# Patient Record
Sex: MALE | Race: WHITE | NOT HISPANIC OR LATINO | Employment: OTHER | URBAN - METROPOLITAN AREA
[De-identification: names, ages, dates, MRNs, and addresses within clinical notes are randomized per-mention and may not be internally consistent; named-entity substitution may affect disease eponyms.]

---

## 2017-07-21 ENCOUNTER — ALLSCRIPTS OFFICE VISIT (OUTPATIENT)
Dept: OTHER | Facility: OTHER | Age: 72
End: 2017-07-21

## 2017-07-21 LAB
BILIRUB UR QL STRIP: NEGATIVE
CLARITY UR: NORMAL
COLOR UR: YELLOW
GLUCOSE (HISTORICAL): NEGATIVE
HGB UR QL STRIP.AUTO: NORMAL
KETONES UR STRIP-MCNC: NEGATIVE MG/DL
LEUKOCYTE ESTERASE UR QL STRIP: NEGATIVE
NITRITE UR QL STRIP: NEGATIVE
PH UR STRIP.AUTO: 7 [PH]
PROT UR STRIP-MCNC: NEGATIVE MG/DL
SP GR UR STRIP.AUTO: 1.01
UROBILINOGEN UR QL STRIP.AUTO: 0.2

## 2017-07-31 DIAGNOSIS — K51.90 ULCERATIVE COLITIS WITHOUT COMPLICATIONS (HCC): ICD-10-CM

## 2017-07-31 DIAGNOSIS — N39.0 URINARY TRACT INFECTION: ICD-10-CM

## 2017-08-03 ENCOUNTER — ANESTHESIA EVENT (OUTPATIENT)
Dept: PERIOP | Facility: HOSPITAL | Age: 72
End: 2017-08-03
Payer: COMMERCIAL

## 2017-08-03 RX ORDER — SODIUM CHLORIDE 9 MG/ML
125 INJECTION, SOLUTION INTRAVENOUS CONTINUOUS
Status: CANCELLED | OUTPATIENT
Start: 2017-08-10

## 2017-08-04 ENCOUNTER — APPOINTMENT (OUTPATIENT)
Dept: PREADMISSION TESTING | Facility: HOSPITAL | Age: 72
End: 2017-08-04
Payer: COMMERCIAL

## 2017-08-04 ENCOUNTER — APPOINTMENT (OUTPATIENT)
Dept: LAB | Facility: HOSPITAL | Age: 72
End: 2017-08-04
Attending: UROLOGY
Payer: COMMERCIAL

## 2017-08-04 ENCOUNTER — TRANSCRIBE ORDERS (OUTPATIENT)
Dept: ADMINISTRATIVE | Facility: HOSPITAL | Age: 72
End: 2017-08-04

## 2017-08-04 ENCOUNTER — HOSPITAL ENCOUNTER (OUTPATIENT)
Dept: NON INVASIVE DIAGNOSTICS | Facility: HOSPITAL | Age: 72
Discharge: HOME | End: 2017-08-04
Attending: UROLOGY
Payer: COMMERCIAL

## 2017-08-04 VITALS
HEART RATE: 61 BPM | TEMPERATURE: 97.4 F | HEIGHT: 72 IN | DIASTOLIC BLOOD PRESSURE: 78 MMHG | SYSTOLIC BLOOD PRESSURE: 128 MMHG | WEIGHT: 179 LBS | BODY MASS INDEX: 24.24 KG/M2 | RESPIRATION RATE: 16 BRPM

## 2017-08-04 DIAGNOSIS — Z01.818 PREOP EXAMINATION: ICD-10-CM

## 2017-08-04 DIAGNOSIS — N13.8 ENLARGED PROSTATE WITH URINARY OBSTRUCTION: ICD-10-CM

## 2017-08-04 DIAGNOSIS — Z01.818 PREOP EXAMINATION: Primary | ICD-10-CM

## 2017-08-04 DIAGNOSIS — N40.1 ENLARGED PROSTATE WITH URINARY OBSTRUCTION: ICD-10-CM

## 2017-08-04 LAB
ALBUMIN SERPL BCP-MCNC: 3.9 G/DL (ref 3.5–5)
ALP SERPL-CCNC: 85 U/L (ref 46–116)
ALT SERPL W P-5'-P-CCNC: 39 U/L (ref 12–78)
ANION GAP SERPL CALCULATED.3IONS-SCNC: 5 MMOL/L (ref 4–13)
APTT PPP: 26 SECONDS (ref 23–35)
AST SERPL W P-5'-P-CCNC: 23 U/L (ref 5–45)
BASOPHILS # BLD AUTO: 0.04 THOUSANDS/ΜL (ref 0–0.1)
BASOPHILS NFR BLD AUTO: 1 % (ref 0–1)
BILIRUB SERPL-MCNC: 0.54 MG/DL (ref 0.2–1)
BUN SERPL-MCNC: 13 MG/DL (ref 5–25)
CALCIUM SERPL-MCNC: 9.6 MG/DL (ref 8.3–10.1)
CHLORIDE SERPL-SCNC: 102 MMOL/L (ref 100–108)
CO2 SERPL-SCNC: 31 MMOL/L (ref 21–32)
CREAT SERPL-MCNC: 0.87 MG/DL (ref 0.6–1.3)
EOSINOPHIL # BLD AUTO: 0.11 THOUSAND/ΜL (ref 0–0.61)
EOSINOPHIL NFR BLD AUTO: 2 % (ref 0–6)
ERYTHROCYTE [DISTWIDTH] IN BLOOD BY AUTOMATED COUNT: 14.7 % (ref 11.6–15.1)
GFR SERPL CREATININE-BSD FRML MDRD: 85 ML/MIN/1.73SQ M
GLUCOSE SERPL-MCNC: 102 MG/DL (ref 65–140)
HCT VFR BLD AUTO: 48.2 % (ref 36.5–49.3)
HGB BLD-MCNC: 16.4 G/DL (ref 12–17)
INR PPP: 1.03 (ref 0.86–1.16)
LYMPHOCYTES # BLD AUTO: 1.55 THOUSANDS/ΜL (ref 0.6–4.47)
LYMPHOCYTES NFR BLD AUTO: 23 % (ref 14–44)
MCH RBC QN AUTO: 30.8 PG (ref 26.8–34.3)
MCHC RBC AUTO-ENTMCNC: 34 G/DL (ref 31.4–37.4)
MCV RBC AUTO: 91 FL (ref 82–98)
MONOCYTES # BLD AUTO: 0.71 THOUSAND/ΜL (ref 0.17–1.22)
MONOCYTES NFR BLD AUTO: 10 % (ref 4–12)
NEUTROPHILS # BLD AUTO: 4.41 THOUSANDS/ΜL (ref 1.85–7.62)
NEUTS SEG NFR BLD AUTO: 64 % (ref 43–75)
NRBC BLD AUTO-RTO: 0 /100 WBCS
PLATELET # BLD AUTO: 260 THOUSANDS/UL (ref 149–390)
PMV BLD AUTO: 10.8 FL (ref 8.9–12.7)
POTASSIUM SERPL-SCNC: 4 MMOL/L (ref 3.5–5.3)
PROT SERPL-MCNC: 7.5 G/DL (ref 6.4–8.2)
PROTHROMBIN TIME: 13.5 SECONDS (ref 12.1–14.4)
RBC # BLD AUTO: 5.32 MILLION/UL (ref 3.88–5.62)
SODIUM SERPL-SCNC: 138 MMOL/L (ref 136–145)
WBC # BLD AUTO: 6.82 THOUSAND/UL (ref 4.31–10.16)

## 2017-08-04 PROCEDURE — 85025 COMPLETE CBC W/AUTO DIFF WBC: CPT

## 2017-08-04 PROCEDURE — 93005 ELECTROCARDIOGRAM TRACING: CPT

## 2017-08-04 PROCEDURE — 80053 COMPREHEN METABOLIC PANEL: CPT

## 2017-08-04 PROCEDURE — 36415 COLL VENOUS BLD VENIPUNCTURE: CPT

## 2017-08-04 PROCEDURE — 85730 THROMBOPLASTIN TIME PARTIAL: CPT

## 2017-08-04 PROCEDURE — 87086 URINE CULTURE/COLONY COUNT: CPT

## 2017-08-04 PROCEDURE — 85610 PROTHROMBIN TIME: CPT

## 2017-08-04 RX ORDER — FLUTICASONE PROPIONATE 50 MCG
1 SPRAY, SUSPENSION (ML) NASAL EVERY EVENING
COMMUNITY

## 2017-08-04 RX ORDER — TAMSULOSIN HYDROCHLORIDE 0.4 MG/1
0.4 CAPSULE ORAL
COMMUNITY
End: 2018-10-29

## 2017-08-04 RX ORDER — FINASTERIDE 5 MG/1
5 TABLET, FILM COATED ORAL
COMMUNITY
End: 2018-10-29

## 2017-08-04 RX ORDER — MONTELUKAST SODIUM 10 MG/1
10 TABLET ORAL
COMMUNITY

## 2017-08-04 RX ORDER — ROSUVASTATIN CALCIUM 10 MG/1
10 TABLET, COATED ORAL
COMMUNITY

## 2017-08-04 RX ORDER — ACETAMINOPHEN 500 MG
500 TABLET ORAL EVERY 6 HOURS PRN
COMMUNITY

## 2017-08-04 RX ORDER — ALBUTEROL SULFATE 90 UG/1
2 AEROSOL, METERED RESPIRATORY (INHALATION) AS NEEDED
COMMUNITY

## 2017-08-04 RX ORDER — VALSARTAN 80 MG/1
80 TABLET ORAL DAILY
COMMUNITY
End: 2019-01-17 | Stop reason: ALTCHOICE

## 2017-08-05 LAB
ATRIAL RATE: 61 BPM
BACTERIA UR CULT: NORMAL
P AXIS: 60 DEGREES
PR INTERVAL: 162 MS
QRS AXIS: 70 DEGREES
QRSD INTERVAL: 110 MS
QT INTERVAL: 392 MS
QTC INTERVAL: 394 MS
T WAVE AXIS: 62 DEGREES
VENTRICULAR RATE: 61 BPM

## 2017-08-10 ENCOUNTER — GENERIC CONVERSION - ENCOUNTER (OUTPATIENT)
Dept: UROLOGY | Facility: MEDICAL CENTER | Age: 72
End: 2017-08-10

## 2017-08-10 ENCOUNTER — ANESTHESIA (OUTPATIENT)
Dept: PERIOP | Facility: HOSPITAL | Age: 72
End: 2017-08-10
Payer: COMMERCIAL

## 2017-08-10 ENCOUNTER — HOSPITAL ENCOUNTER (OUTPATIENT)
Facility: HOSPITAL | Age: 72
Setting detail: OUTPATIENT SURGERY
Discharge: HOME | End: 2017-08-11
Attending: UROLOGY | Admitting: UROLOGY
Payer: COMMERCIAL

## 2017-08-10 DIAGNOSIS — N40.1 ENLARGED PROSTATE WITH LOWER URINARY TRACT SYMPTOMS (LUTS): ICD-10-CM

## 2017-08-10 LAB
ANION GAP SERPL CALCULATED.3IONS-SCNC: 5 MMOL/L (ref 4–13)
BUN SERPL-MCNC: 8 MG/DL (ref 5–25)
CALCIUM SERPL-MCNC: 8.1 MG/DL (ref 8.3–10.1)
CHLORIDE SERPL-SCNC: 109 MMOL/L (ref 100–108)
CO2 SERPL-SCNC: 27 MMOL/L (ref 21–32)
CREAT SERPL-MCNC: 0.78 MG/DL (ref 0.6–1.3)
ERYTHROCYTE [DISTWIDTH] IN BLOOD BY AUTOMATED COUNT: 14.6 % (ref 11.6–15.1)
GFR SERPL CREATININE-BSD FRML MDRD: 89 ML/MIN/1.73SQ M
GLUCOSE P FAST SERPL-MCNC: 122 MG/DL (ref 65–99)
GLUCOSE SERPL-MCNC: 122 MG/DL (ref 65–140)
HCT VFR BLD AUTO: 40.6 % (ref 36.5–49.3)
HGB BLD-MCNC: 13.7 G/DL (ref 12–17)
MCH RBC QN AUTO: 31 PG (ref 26.8–34.3)
MCHC RBC AUTO-ENTMCNC: 33.7 G/DL (ref 31.4–37.4)
MCV RBC AUTO: 92 FL (ref 82–98)
PLATELET # BLD AUTO: 200 THOUSANDS/UL (ref 149–390)
PMV BLD AUTO: 10.3 FL (ref 8.9–12.7)
POTASSIUM SERPL-SCNC: 4.1 MMOL/L (ref 3.5–5.3)
RBC # BLD AUTO: 4.42 MILLION/UL (ref 3.88–5.62)
SODIUM SERPL-SCNC: 141 MMOL/L (ref 136–145)
WBC # BLD AUTO: 9.25 THOUSAND/UL (ref 4.31–10.16)

## 2017-08-10 PROCEDURE — 88305 TISSUE EXAM BY PATHOLOGIST: CPT | Performed by: UROLOGY

## 2017-08-10 PROCEDURE — 80048 BASIC METABOLIC PNL TOTAL CA: CPT | Performed by: UROLOGY

## 2017-08-10 PROCEDURE — 85027 COMPLETE CBC AUTOMATED: CPT | Performed by: UROLOGY

## 2017-08-10 RX ORDER — ACETAMINOPHEN 325 MG/1
500 TABLET ORAL EVERY 6 HOURS PRN
Status: DISCONTINUED | OUTPATIENT
Start: 2017-08-10 | End: 2017-08-10 | Stop reason: SDUPTHER

## 2017-08-10 RX ORDER — MONTELUKAST SODIUM 10 MG/1
10 TABLET ORAL
Status: DISCONTINUED | OUTPATIENT
Start: 2017-08-10 | End: 2017-08-11 | Stop reason: HOSPADM

## 2017-08-10 RX ORDER — MIDAZOLAM HYDROCHLORIDE 1 MG/ML
INJECTION INTRAMUSCULAR; INTRAVENOUS AS NEEDED
Status: DISCONTINUED | OUTPATIENT
Start: 2017-08-10 | End: 2017-08-10 | Stop reason: SURG

## 2017-08-10 RX ORDER — SODIUM CHLORIDE 9 MG/ML
50 INJECTION, SOLUTION INTRAVENOUS CONTINUOUS
Status: DISCONTINUED | OUTPATIENT
Start: 2017-08-10 | End: 2017-08-11 | Stop reason: HOSPADM

## 2017-08-10 RX ORDER — ONDANSETRON 2 MG/ML
4 INJECTION INTRAMUSCULAR; INTRAVENOUS ONCE AS NEEDED
Status: DISCONTINUED | OUTPATIENT
Start: 2017-08-10 | End: 2017-08-10 | Stop reason: HOSPADM

## 2017-08-10 RX ORDER — ALBUTEROL SULFATE 90 UG/1
2 AEROSOL, METERED RESPIRATORY (INHALATION) EVERY 6 HOURS PRN
Status: DISCONTINUED | OUTPATIENT
Start: 2017-08-10 | End: 2017-08-11 | Stop reason: HOSPADM

## 2017-08-10 RX ORDER — FINASTERIDE 5 MG/1
5 TABLET, FILM COATED ORAL
Status: DISCONTINUED | OUTPATIENT
Start: 2017-08-10 | End: 2017-08-11 | Stop reason: HOSPADM

## 2017-08-10 RX ORDER — PRAVASTATIN SODIUM 40 MG
40 TABLET ORAL
Status: DISCONTINUED | OUTPATIENT
Start: 2017-08-10 | End: 2017-08-11 | Stop reason: HOSPADM

## 2017-08-10 RX ORDER — PROPOFOL 10 MG/ML
INJECTION, EMULSION INTRAVENOUS AS NEEDED
Status: DISCONTINUED | OUTPATIENT
Start: 2017-08-10 | End: 2017-08-10 | Stop reason: SURG

## 2017-08-10 RX ORDER — DOCUSATE SODIUM 100 MG/1
100 CAPSULE, LIQUID FILLED ORAL 2 TIMES DAILY
Status: DISCONTINUED | OUTPATIENT
Start: 2017-08-10 | End: 2017-08-11 | Stop reason: HOSPADM

## 2017-08-10 RX ORDER — MAGNESIUM HYDROXIDE 1200 MG/15ML
LIQUID ORAL AS NEEDED
Status: DISCONTINUED | OUTPATIENT
Start: 2017-08-10 | End: 2017-08-10 | Stop reason: HOSPADM

## 2017-08-10 RX ORDER — FLUTICASONE PROPIONATE 50 MCG
1 SPRAY, SUSPENSION (ML) NASAL EVERY EVENING
Status: DISCONTINUED | OUTPATIENT
Start: 2017-08-10 | End: 2017-08-11 | Stop reason: HOSPADM

## 2017-08-10 RX ORDER — SORBITOL 30 G/1000ML
IRRIGANT IRRIGATION AS NEEDED
Status: DISCONTINUED | OUTPATIENT
Start: 2017-08-10 | End: 2017-08-10 | Stop reason: HOSPADM

## 2017-08-10 RX ORDER — FENTANYL CITRATE/PF 50 MCG/ML
25 SYRINGE (ML) INJECTION
Status: DISCONTINUED | OUTPATIENT
Start: 2017-08-10 | End: 2017-08-10 | Stop reason: HOSPADM

## 2017-08-10 RX ORDER — MORPHINE SULFATE 2 MG/ML
2 INJECTION, SOLUTION INTRAMUSCULAR; INTRAVENOUS EVERY 4 HOURS PRN
Status: DISCONTINUED | OUTPATIENT
Start: 2017-08-10 | End: 2017-08-11 | Stop reason: HOSPADM

## 2017-08-10 RX ORDER — SODIUM CHLORIDE 9 MG/ML
INJECTION, SOLUTION INTRAVENOUS AS NEEDED
Status: DISCONTINUED | OUTPATIENT
Start: 2017-08-10 | End: 2017-08-10 | Stop reason: HOSPADM

## 2017-08-10 RX ORDER — ACETAMINOPHEN 325 MG/1
650 TABLET ORAL EVERY 4 HOURS PRN
Status: DISCONTINUED | OUTPATIENT
Start: 2017-08-10 | End: 2017-08-11 | Stop reason: HOSPADM

## 2017-08-10 RX ORDER — CEFAZOLIN SODIUM 1 G/3ML
INJECTION, POWDER, FOR SOLUTION INTRAMUSCULAR; INTRAVENOUS AS NEEDED
Status: DISCONTINUED | OUTPATIENT
Start: 2017-08-10 | End: 2017-08-10 | Stop reason: SURG

## 2017-08-10 RX ORDER — TAMSULOSIN HYDROCHLORIDE 0.4 MG/1
0.4 CAPSULE ORAL
Status: DISCONTINUED | OUTPATIENT
Start: 2017-08-10 | End: 2017-08-11 | Stop reason: HOSPADM

## 2017-08-10 RX ORDER — SODIUM CHLORIDE 9 MG/ML
125 INJECTION, SOLUTION INTRAVENOUS CONTINUOUS
Status: DISCONTINUED | OUTPATIENT
Start: 2017-08-10 | End: 2017-08-10

## 2017-08-10 RX ORDER — VALSARTAN 80 MG/1
80 TABLET ORAL DAILY
Status: DISCONTINUED | OUTPATIENT
Start: 2017-08-11 | End: 2017-08-11 | Stop reason: HOSPADM

## 2017-08-10 RX ORDER — FENTANYL CITRATE 50 UG/ML
INJECTION, SOLUTION INTRAMUSCULAR; INTRAVENOUS AS NEEDED
Status: DISCONTINUED | OUTPATIENT
Start: 2017-08-10 | End: 2017-08-10 | Stop reason: SURG

## 2017-08-10 RX ADMIN — SODIUM CHLORIDE 125 ML/HR: 0.9 INJECTION, SOLUTION INTRAVENOUS at 09:06

## 2017-08-10 RX ADMIN — TAMSULOSIN HYDROCHLORIDE 0.4 MG: 0.4 CAPSULE ORAL at 18:56

## 2017-08-10 RX ADMIN — ACETAMINOPHEN 650 MG: 325 TABLET, FILM COATED ORAL at 22:08

## 2017-08-10 RX ADMIN — SODIUM CHLORIDE: 0.9 INJECTION, SOLUTION INTRAVENOUS at 14:20

## 2017-08-10 RX ADMIN — FINASTERIDE 5 MG: 5 TABLET, FILM COATED ORAL at 21:35

## 2017-08-10 RX ADMIN — PROPOFOL 200 MG: 10 INJECTION, EMULSION INTRAVENOUS at 13:57

## 2017-08-10 RX ADMIN — SODIUM CHLORIDE 50 ML/HR: 0.9 INJECTION, SOLUTION INTRAVENOUS at 18:59

## 2017-08-10 RX ADMIN — FLUTICASONE PROPIONATE 1 SPRAY: 50 SPRAY, METERED NASAL at 22:59

## 2017-08-10 RX ADMIN — DEXAMETHASONE SODIUM PHOSPHATE 4 MG: 10 INJECTION INTRAMUSCULAR; INTRAVENOUS at 14:24

## 2017-08-10 RX ADMIN — DOCUSATE SODIUM 100 MG: 100 CAPSULE, LIQUID FILLED ORAL at 18:56

## 2017-08-10 RX ADMIN — FENTANYL CITRATE 50 MCG: 50 INJECTION, SOLUTION INTRAMUSCULAR; INTRAVENOUS at 14:12

## 2017-08-10 RX ADMIN — FLUTICASONE PROPIONATE AND SALMETEROL 1 PUFF: 50; 100 POWDER RESPIRATORY (INHALATION) at 22:57

## 2017-08-10 RX ADMIN — LIDOCAINE HYDROCHLORIDE 40 MG: 20 INJECTION, SOLUTION INTRAVENOUS at 13:57

## 2017-08-10 RX ADMIN — SODIUM CHLORIDE: 0.9 INJECTION, SOLUTION INTRAVENOUS at 16:04

## 2017-08-10 RX ADMIN — MONTELUKAST SODIUM 10 MG: 10 TABLET, COATED ORAL at 21:35

## 2017-08-10 RX ADMIN — SODIUM CHLORIDE: 0.9 INJECTION, SOLUTION INTRAVENOUS at 15:41

## 2017-08-10 RX ADMIN — MIDAZOLAM HYDROCHLORIDE 2 MG: 1 INJECTION, SOLUTION INTRAMUSCULAR; INTRAVENOUS at 13:53

## 2017-08-10 RX ADMIN — CEFAZOLIN 2000 MG: 1 INJECTION, POWDER, FOR SOLUTION INTRAVENOUS at 13:53

## 2017-08-11 ENCOUNTER — GENERIC CONVERSION - ENCOUNTER (OUTPATIENT)
Dept: OTHER | Facility: OTHER | Age: 72
End: 2017-08-11

## 2017-08-11 VITALS
BODY MASS INDEX: 24.24 KG/M2 | TEMPERATURE: 96.7 F | HEIGHT: 72 IN | OXYGEN SATURATION: 92 % | RESPIRATION RATE: 18 BRPM | SYSTOLIC BLOOD PRESSURE: 121 MMHG | WEIGHT: 179 LBS | DIASTOLIC BLOOD PRESSURE: 61 MMHG | HEART RATE: 70 BPM

## 2017-08-11 LAB
ANION GAP SERPL CALCULATED.3IONS-SCNC: 6 MMOL/L (ref 4–13)
BASOPHILS # BLD AUTO: 0.01 THOUSANDS/ΜL (ref 0–0.1)
BASOPHILS NFR BLD AUTO: 0 % (ref 0–1)
BUN SERPL-MCNC: 8 MG/DL (ref 5–25)
CALCIUM SERPL-MCNC: 8.1 MG/DL (ref 8.3–10.1)
CHLORIDE SERPL-SCNC: 109 MMOL/L (ref 100–108)
CO2 SERPL-SCNC: 25 MMOL/L (ref 21–32)
CREAT SERPL-MCNC: 0.77 MG/DL (ref 0.6–1.3)
EOSINOPHIL # BLD AUTO: 0 THOUSAND/ΜL (ref 0–0.61)
EOSINOPHIL NFR BLD AUTO: 0 % (ref 0–6)
ERYTHROCYTE [DISTWIDTH] IN BLOOD BY AUTOMATED COUNT: 14.7 % (ref 11.6–15.1)
ERYTHROCYTE [DISTWIDTH] IN BLOOD BY AUTOMATED COUNT: 14.7 % (ref 11.6–15.1)
GFR SERPL CREATININE-BSD FRML MDRD: 89 ML/MIN/1.73SQ M
GLUCOSE SERPL-MCNC: 122 MG/DL (ref 65–140)
HCT VFR BLD AUTO: 36.2 % (ref 36.5–49.3)
HCT VFR BLD AUTO: 37.9 % (ref 36.5–49.3)
HGB BLD-MCNC: 12.3 G/DL (ref 12–17)
HGB BLD-MCNC: 13 G/DL (ref 12–17)
LYMPHOCYTES # BLD AUTO: 0.88 THOUSANDS/ΜL (ref 0.6–4.47)
LYMPHOCYTES NFR BLD AUTO: 9 % (ref 14–44)
MCH RBC QN AUTO: 31 PG (ref 26.8–34.3)
MCH RBC QN AUTO: 31.2 PG (ref 26.8–34.3)
MCHC RBC AUTO-ENTMCNC: 34 G/DL (ref 31.4–37.4)
MCHC RBC AUTO-ENTMCNC: 34.3 G/DL (ref 31.4–37.4)
MCV RBC AUTO: 91 FL (ref 82–98)
MCV RBC AUTO: 91 FL (ref 82–98)
MONOCYTES # BLD AUTO: 0.66 THOUSAND/ΜL (ref 0.17–1.22)
MONOCYTES NFR BLD AUTO: 7 % (ref 4–12)
NEUTROPHILS # BLD AUTO: 7.98 THOUSANDS/ΜL (ref 1.85–7.62)
NEUTS SEG NFR BLD AUTO: 84 % (ref 43–75)
NRBC BLD AUTO-RTO: 0 /100 WBCS
PLATELET # BLD AUTO: 216 THOUSANDS/UL (ref 149–390)
PLATELET # BLD AUTO: 228 THOUSANDS/UL (ref 149–390)
PMV BLD AUTO: 10.5 FL (ref 8.9–12.7)
PMV BLD AUTO: 10.6 FL (ref 8.9–12.7)
POTASSIUM SERPL-SCNC: 3.7 MMOL/L (ref 3.5–5.3)
RBC # BLD AUTO: 3.97 MILLION/UL (ref 3.88–5.62)
RBC # BLD AUTO: 4.17 MILLION/UL (ref 3.88–5.62)
SODIUM SERPL-SCNC: 140 MMOL/L (ref 136–145)
WBC # BLD AUTO: 8.66 THOUSAND/UL (ref 4.31–10.16)
WBC # BLD AUTO: 9.53 THOUSAND/UL (ref 4.31–10.16)

## 2017-08-11 PROCEDURE — 85027 COMPLETE CBC AUTOMATED: CPT | Performed by: UROLOGY

## 2017-08-11 PROCEDURE — 80048 BASIC METABOLIC PNL TOTAL CA: CPT | Performed by: UROLOGY

## 2017-08-11 PROCEDURE — 85025 COMPLETE CBC W/AUTO DIFF WBC: CPT | Performed by: INTERNAL MEDICINE

## 2017-08-11 RX ADMIN — VALSARTAN 80 MG: 80 TABLET, FILM COATED ORAL at 09:00

## 2017-08-11 RX ADMIN — DOCUSATE SODIUM 100 MG: 100 CAPSULE, LIQUID FILLED ORAL at 08:59

## 2017-08-11 RX ADMIN — FLUTICASONE PROPIONATE AND SALMETEROL 1 PUFF: 50; 100 POWDER RESPIRATORY (INHALATION) at 08:59

## 2017-08-14 ENCOUNTER — ALLSCRIPTS OFFICE VISIT (OUTPATIENT)
Dept: OTHER | Facility: OTHER | Age: 72
End: 2017-08-14

## 2017-08-15 ENCOUNTER — GENERIC CONVERSION - ENCOUNTER (OUTPATIENT)
Dept: OTHER | Facility: OTHER | Age: 72
End: 2017-08-15

## 2017-08-15 ENCOUNTER — APPOINTMENT (EMERGENCY)
Dept: RADIOLOGY | Facility: HOSPITAL | Age: 72
DRG: 690 | End: 2017-08-15
Payer: COMMERCIAL

## 2017-08-15 ENCOUNTER — HOSPITAL ENCOUNTER (INPATIENT)
Facility: HOSPITAL | Age: 72
LOS: 2 days | Discharge: HOME | DRG: 690 | End: 2017-08-17
Attending: EMERGENCY MEDICINE | Admitting: INTERNAL MEDICINE
Payer: COMMERCIAL

## 2017-08-15 DIAGNOSIS — N41.9 PROSTATITIS: ICD-10-CM

## 2017-08-15 DIAGNOSIS — J18.9 BILATERAL PNEUMONIA: Primary | ICD-10-CM

## 2017-08-15 DIAGNOSIS — R50.9 FEVER: ICD-10-CM

## 2017-08-15 DIAGNOSIS — N39.0 URINARY TRACT INFECTION: ICD-10-CM

## 2017-08-15 DIAGNOSIS — R00.0 SINUS TACHYCARDIA: ICD-10-CM

## 2017-08-15 LAB
ALBUMIN SERPL BCP-MCNC: 3.2 G/DL (ref 3.5–5)
ALP SERPL-CCNC: 75 U/L (ref 46–116)
ALT SERPL W P-5'-P-CCNC: 34 U/L (ref 12–78)
ANION GAP SERPL CALCULATED.3IONS-SCNC: 10 MMOL/L (ref 4–13)
APTT PPP: 25 SECONDS (ref 23–35)
AST SERPL W P-5'-P-CCNC: 18 U/L (ref 5–45)
ATRIAL RATE: 121 BPM
ATRIAL RATE: 152 BPM
BACTERIA UR QL AUTO: ABNORMAL /HPF
BASOPHILS # BLD AUTO: 0.01 THOUSANDS/ΜL (ref 0–0.1)
BASOPHILS NFR BLD AUTO: 0 % (ref 0–1)
BILIRUB SERPL-MCNC: 0.91 MG/DL (ref 0.2–1)
BILIRUB UR QL STRIP: NEGATIVE
BUN SERPL-MCNC: 10 MG/DL (ref 5–25)
CALCIUM SERPL-MCNC: 9.2 MG/DL (ref 8.3–10.1)
CHLORIDE SERPL-SCNC: 104 MMOL/L (ref 100–108)
CLARITY UR: CLEAR
CO2 SERPL-SCNC: 27 MMOL/L (ref 21–32)
COLOR UR: ABNORMAL
CREAT SERPL-MCNC: 0.93 MG/DL (ref 0.6–1.3)
EOSINOPHIL # BLD AUTO: 0.05 THOUSAND/ΜL (ref 0–0.61)
EOSINOPHIL NFR BLD AUTO: 1 % (ref 0–6)
ERYTHROCYTE [DISTWIDTH] IN BLOOD BY AUTOMATED COUNT: 14.5 % (ref 11.6–15.1)
GFR SERPL CREATININE-BSD FRML MDRD: 81 ML/MIN/1.73SQ M
GLUCOSE SERPL-MCNC: 132 MG/DL (ref 65–140)
GLUCOSE UR STRIP-MCNC: NEGATIVE MG/DL
HCT VFR BLD AUTO: 38.6 % (ref 36.5–49.3)
HGB BLD-MCNC: 13.3 G/DL (ref 12–17)
HGB UR QL STRIP.AUTO: ABNORMAL
INR PPP: 0.98 (ref 0.86–1.16)
KETONES UR STRIP-MCNC: NEGATIVE MG/DL
LACTATE SERPL-SCNC: 1.7 MMOL/L (ref 0.5–2)
LEUKOCYTE ESTERASE UR QL STRIP: ABNORMAL
LYMPHOCYTES # BLD AUTO: 0.29 THOUSANDS/ΜL (ref 0.6–4.47)
LYMPHOCYTES NFR BLD AUTO: 6 % (ref 14–44)
MCH RBC QN AUTO: 31.1 PG (ref 26.8–34.3)
MCHC RBC AUTO-ENTMCNC: 34.5 G/DL (ref 31.4–37.4)
MCV RBC AUTO: 90 FL (ref 82–98)
MONOCYTES # BLD AUTO: 0.19 THOUSAND/ΜL (ref 0.17–1.22)
MONOCYTES NFR BLD AUTO: 4 % (ref 4–12)
NEUTROPHILS # BLD AUTO: 4.74 THOUSANDS/ΜL (ref 1.85–7.62)
NEUTS SEG NFR BLD AUTO: 89 % (ref 43–75)
NITRITE UR QL STRIP: NEGATIVE
NON-SQ EPI CELLS URNS QL MICRO: ABNORMAL /HPF
NRBC BLD AUTO-RTO: 0 /100 WBCS
P AXIS: 67 DEGREES
P AXIS: 70 DEGREES
PH UR STRIP.AUTO: 8.5 [PH] (ref 4.5–8)
PLATELET # BLD AUTO: 216 THOUSANDS/UL (ref 149–390)
PMV BLD AUTO: 10.4 FL (ref 8.9–12.7)
POTASSIUM SERPL-SCNC: 3.7 MMOL/L (ref 3.5–5.3)
PR INTERVAL: 134 MS
PR INTERVAL: 154 MS
PROT SERPL-MCNC: 6.6 G/DL (ref 6.4–8.2)
PROT UR STRIP-MCNC: >=300 MG/DL
PROTHROMBIN TIME: 13 SECONDS (ref 12.1–14.4)
QRS AXIS: 80 DEGREES
QRS AXIS: 92 DEGREES
QRSD INTERVAL: 102 MS
QRSD INTERVAL: 86 MS
QT INTERVAL: 262 MS
QT INTERVAL: 302 MS
QTC INTERVAL: 416 MS
QTC INTERVAL: 428 MS
RBC # BLD AUTO: 4.28 MILLION/UL (ref 3.88–5.62)
RBC #/AREA URNS AUTO: ABNORMAL /HPF
SODIUM SERPL-SCNC: 141 MMOL/L (ref 136–145)
SP GR UR STRIP.AUTO: 1.02 (ref 1–1.03)
SPECIMEN SOURCE: NORMAL
T WAVE AXIS: 58 DEGREES
T WAVE AXIS: 59 DEGREES
TROPONIN I BLD-MCNC: 0 NG/ML (ref 0–0.08)
UROBILINOGEN UR QL STRIP.AUTO: 1 E.U./DL
VENTRICULAR RATE: 121 BPM
VENTRICULAR RATE: 152 BPM
WBC # BLD AUTO: 5.28 THOUSAND/UL (ref 4.31–10.16)
WBC #/AREA URNS AUTO: ABNORMAL /HPF

## 2017-08-15 PROCEDURE — 81001 URINALYSIS AUTO W/SCOPE: CPT

## 2017-08-15 PROCEDURE — 80053 COMPREHEN METABOLIC PANEL: CPT | Performed by: EMERGENCY MEDICINE

## 2017-08-15 PROCEDURE — 87040 BLOOD CULTURE FOR BACTERIA: CPT | Performed by: EMERGENCY MEDICINE

## 2017-08-15 PROCEDURE — 99285 EMERGENCY DEPT VISIT HI MDM: CPT

## 2017-08-15 PROCEDURE — 83605 ASSAY OF LACTIC ACID: CPT | Performed by: EMERGENCY MEDICINE

## 2017-08-15 PROCEDURE — 85730 THROMBOPLASTIN TIME PARTIAL: CPT | Performed by: EMERGENCY MEDICINE

## 2017-08-15 PROCEDURE — 87186 SC STD MICRODIL/AGAR DIL: CPT

## 2017-08-15 PROCEDURE — 85025 COMPLETE CBC W/AUTO DIFF WBC: CPT | Performed by: EMERGENCY MEDICINE

## 2017-08-15 PROCEDURE — 85610 PROTHROMBIN TIME: CPT | Performed by: EMERGENCY MEDICINE

## 2017-08-15 PROCEDURE — 71020 HB CHEST X-RAY 2VW FRONTAL&LATL: CPT

## 2017-08-15 PROCEDURE — 93005 ELECTROCARDIOGRAM TRACING: CPT | Performed by: EMERGENCY MEDICINE

## 2017-08-15 PROCEDURE — 84484 ASSAY OF TROPONIN QUANT: CPT

## 2017-08-15 PROCEDURE — 87147 CULTURE TYPE IMMUNOLOGIC: CPT

## 2017-08-15 PROCEDURE — 36415 COLL VENOUS BLD VENIPUNCTURE: CPT | Performed by: EMERGENCY MEDICINE

## 2017-08-15 PROCEDURE — 81002 URINALYSIS NONAUTO W/O SCOPE: CPT | Performed by: EMERGENCY MEDICINE

## 2017-08-15 PROCEDURE — 96360 HYDRATION IV INFUSION INIT: CPT

## 2017-08-15 PROCEDURE — 87077 CULTURE AEROBIC IDENTIFY: CPT

## 2017-08-15 PROCEDURE — 96365 THER/PROPH/DIAG IV INF INIT: CPT

## 2017-08-15 PROCEDURE — 87086 URINE CULTURE/COLONY COUNT: CPT

## 2017-08-15 RX ORDER — ACETAMINOPHEN 160 MG/5ML
650 SUSPENSION, ORAL (FINAL DOSE FORM) ORAL ONCE
Status: COMPLETED | OUTPATIENT
Start: 2017-08-15 | End: 2017-08-15

## 2017-08-15 RX ORDER — LEVOFLOXACIN 5 MG/ML
750 INJECTION, SOLUTION INTRAVENOUS EVERY 24 HOURS
Status: DISCONTINUED | OUTPATIENT
Start: 2017-08-16 | End: 2017-08-17

## 2017-08-15 RX ORDER — PRAVASTATIN SODIUM 80 MG/1
80 TABLET ORAL
Status: DISCONTINUED | OUTPATIENT
Start: 2017-08-15 | End: 2017-08-17 | Stop reason: HOSPADM

## 2017-08-15 RX ORDER — SODIUM CHLORIDE 9 MG/ML
100 INJECTION, SOLUTION INTRAVENOUS CONTINUOUS
Status: DISCONTINUED | OUTPATIENT
Start: 2017-08-15 | End: 2017-08-16

## 2017-08-15 RX ORDER — 0.9 % SODIUM CHLORIDE 0.9 %
3 VIAL (ML) INJECTION AS NEEDED
Status: DISCONTINUED | OUTPATIENT
Start: 2017-08-15 | End: 2017-08-17 | Stop reason: HOSPADM

## 2017-08-15 RX ORDER — VALSARTAN 160 MG/1
80 TABLET ORAL DAILY
Status: DISCONTINUED | OUTPATIENT
Start: 2017-08-16 | End: 2017-08-17 | Stop reason: HOSPADM

## 2017-08-15 RX ORDER — SODIUM CHLORIDE 9 MG/ML
125 INJECTION, SOLUTION INTRAVENOUS CONTINUOUS
Status: DISCONTINUED | OUTPATIENT
Start: 2017-08-15 | End: 2017-08-15

## 2017-08-15 RX ORDER — ALBUTEROL SULFATE 90 UG/1
2 AEROSOL, METERED RESPIRATORY (INHALATION) EVERY 6 HOURS PRN
Status: DISCONTINUED | OUTPATIENT
Start: 2017-08-15 | End: 2017-08-17 | Stop reason: HOSPADM

## 2017-08-15 RX ORDER — ONDANSETRON 2 MG/ML
4 INJECTION INTRAMUSCULAR; INTRAVENOUS EVERY 6 HOURS PRN
Status: DISCONTINUED | OUTPATIENT
Start: 2017-08-15 | End: 2017-08-17 | Stop reason: HOSPADM

## 2017-08-15 RX ORDER — TAMSULOSIN HYDROCHLORIDE 0.4 MG/1
0.4 CAPSULE ORAL
Status: DISCONTINUED | OUTPATIENT
Start: 2017-08-15 | End: 2017-08-17 | Stop reason: HOSPADM

## 2017-08-15 RX ORDER — FINASTERIDE 5 MG/1
5 TABLET, FILM COATED ORAL
Status: DISCONTINUED | OUTPATIENT
Start: 2017-08-15 | End: 2017-08-17 | Stop reason: HOSPADM

## 2017-08-15 RX ORDER — MONTELUKAST SODIUM 10 MG/1
10 TABLET ORAL
Status: DISCONTINUED | OUTPATIENT
Start: 2017-08-15 | End: 2017-08-17 | Stop reason: HOSPADM

## 2017-08-15 RX ORDER — ACETAMINOPHEN 160 MG/5ML
650 SUSPENSION, ORAL (FINAL DOSE FORM) ORAL EVERY 6 HOURS PRN
Status: DISCONTINUED | OUTPATIENT
Start: 2017-08-15 | End: 2017-08-17 | Stop reason: HOSPADM

## 2017-08-15 RX ORDER — LEVOFLOXACIN 5 MG/ML
750 INJECTION, SOLUTION INTRAVENOUS ONCE
Status: COMPLETED | OUTPATIENT
Start: 2017-08-15 | End: 2017-08-15

## 2017-08-15 RX ADMIN — SODIUM CHLORIDE 50 ML/HR: 0.9 INJECTION, SOLUTION INTRAVENOUS at 17:26

## 2017-08-15 RX ADMIN — LEVOFLOXACIN 750 MG: 5 INJECTION, SOLUTION INTRAVENOUS at 13:33

## 2017-08-15 RX ADMIN — SODIUM CHLORIDE 1000 ML: 0.9 INJECTION, SOLUTION INTRAVENOUS at 14:01

## 2017-08-15 RX ADMIN — MONTELUKAST SODIUM 10 MG: 10 TABLET, COATED ORAL at 21:32

## 2017-08-15 RX ADMIN — FINASTERIDE 5 MG: 5 TABLET, FILM COATED ORAL at 21:32

## 2017-08-15 RX ADMIN — SODIUM CHLORIDE 1000 ML: 0.9 INJECTION, SOLUTION INTRAVENOUS at 13:21

## 2017-08-15 RX ADMIN — FLUTICASONE PROPIONATE AND SALMETEROL 1 PUFF: 50; 250 POWDER RESPIRATORY (INHALATION) at 20:15

## 2017-08-15 RX ADMIN — ACETAMINOPHEN 650 MG: 160 SUSPENSION ORAL at 13:22

## 2017-08-15 RX ADMIN — PRAVASTATIN SODIUM 80 MG: 80 TABLET ORAL at 17:24

## 2017-08-15 RX ADMIN — TAMSULOSIN HYDROCHLORIDE 0.4 MG: 0.4 CAPSULE ORAL at 17:24

## 2017-08-16 LAB
ANION GAP SERPL CALCULATED.3IONS-SCNC: 8 MMOL/L (ref 4–13)
BUN SERPL-MCNC: 11 MG/DL (ref 5–25)
CALCIUM SERPL-MCNC: 8.3 MG/DL (ref 8.3–10.1)
CHLORIDE SERPL-SCNC: 105 MMOL/L (ref 100–108)
CO2 SERPL-SCNC: 23 MMOL/L (ref 21–32)
CREAT SERPL-MCNC: 0.82 MG/DL (ref 0.6–1.3)
ERYTHROCYTE [DISTWIDTH] IN BLOOD BY AUTOMATED COUNT: 14.6 % (ref 11.6–15.1)
GFR SERPL CREATININE-BSD FRML MDRD: 87 ML/MIN/1.73SQ M
GLUCOSE SERPL-MCNC: 96 MG/DL (ref 65–140)
HCT VFR BLD AUTO: 30.4 % (ref 36.5–49.3)
HGB BLD-MCNC: 10.3 G/DL (ref 12–17)
MCH RBC QN AUTO: 30.8 PG (ref 26.8–34.3)
MCHC RBC AUTO-ENTMCNC: 33.9 G/DL (ref 31.4–37.4)
MCV RBC AUTO: 91 FL (ref 82–98)
PLATELET # BLD AUTO: 164 THOUSANDS/UL (ref 149–390)
PMV BLD AUTO: 9.9 FL (ref 8.9–12.7)
POTASSIUM SERPL-SCNC: 3.6 MMOL/L (ref 3.5–5.3)
RBC # BLD AUTO: 3.34 MILLION/UL (ref 3.88–5.62)
SODIUM SERPL-SCNC: 136 MMOL/L (ref 136–145)
WBC # BLD AUTO: 6.65 THOUSAND/UL (ref 4.31–10.16)

## 2017-08-16 PROCEDURE — 80048 BASIC METABOLIC PNL TOTAL CA: CPT | Performed by: PHYSICIAN ASSISTANT

## 2017-08-16 PROCEDURE — 85027 COMPLETE CBC AUTOMATED: CPT | Performed by: PHYSICIAN ASSISTANT

## 2017-08-16 RX ORDER — FUROSEMIDE 20 MG/1
20 TABLET ORAL ONCE
Status: COMPLETED | OUTPATIENT
Start: 2017-08-16 | End: 2017-08-16

## 2017-08-16 RX ADMIN — FUROSEMIDE 20 MG: 20 TABLET ORAL at 13:50

## 2017-08-16 RX ADMIN — FINASTERIDE 5 MG: 5 TABLET, FILM COATED ORAL at 21:45

## 2017-08-16 RX ADMIN — ENOXAPARIN SODIUM 40 MG: 40 INJECTION SUBCUTANEOUS at 09:20

## 2017-08-16 RX ADMIN — FLUTICASONE PROPIONATE AND SALMETEROL 1 PUFF: 50; 250 POWDER RESPIRATORY (INHALATION) at 19:56

## 2017-08-16 RX ADMIN — LEVOFLOXACIN 750 MG: 5 INJECTION, SOLUTION INTRAVENOUS at 13:50

## 2017-08-16 RX ADMIN — FLUTICASONE PROPIONATE AND SALMETEROL 1 PUFF: 50; 250 POWDER RESPIRATORY (INHALATION) at 09:20

## 2017-08-16 RX ADMIN — SODIUM CHLORIDE 100 ML/HR: 0.9 INJECTION, SOLUTION INTRAVENOUS at 04:42

## 2017-08-16 RX ADMIN — PRAVASTATIN SODIUM 80 MG: 80 TABLET ORAL at 17:24

## 2017-08-16 RX ADMIN — VALSARTAN 80 MG: 160 TABLET ORAL at 09:20

## 2017-08-16 RX ADMIN — MONTELUKAST SODIUM 10 MG: 10 TABLET, COATED ORAL at 21:45

## 2017-08-16 RX ADMIN — TAMSULOSIN HYDROCHLORIDE 0.4 MG: 0.4 CAPSULE ORAL at 17:24

## 2017-08-17 VITALS
BODY MASS INDEX: 23.95 KG/M2 | TEMPERATURE: 96.5 F | OXYGEN SATURATION: 97 % | HEART RATE: 70 BPM | SYSTOLIC BLOOD PRESSURE: 125 MMHG | DIASTOLIC BLOOD PRESSURE: 79 MMHG | HEIGHT: 72 IN | WEIGHT: 176.81 LBS | RESPIRATION RATE: 18 BRPM

## 2017-08-17 LAB
ANION GAP SERPL CALCULATED.3IONS-SCNC: 10 MMOL/L (ref 4–13)
BACTERIA UR CULT: NORMAL
BASOPHILS # BLD AUTO: 0.02 THOUSANDS/ΜL (ref 0–0.1)
BASOPHILS NFR BLD AUTO: 1 % (ref 0–1)
BUN SERPL-MCNC: 7 MG/DL (ref 5–25)
CALCIUM SERPL-MCNC: 8.8 MG/DL (ref 8.3–10.1)
CHLORIDE SERPL-SCNC: 110 MMOL/L (ref 100–108)
CO2 SERPL-SCNC: 24 MMOL/L (ref 21–32)
CREAT SERPL-MCNC: 0.73 MG/DL (ref 0.6–1.3)
EOSINOPHIL # BLD AUTO: 0.11 THOUSAND/ΜL (ref 0–0.61)
EOSINOPHIL NFR BLD AUTO: 3 % (ref 0–6)
ERYTHROCYTE [DISTWIDTH] IN BLOOD BY AUTOMATED COUNT: 14.6 % (ref 11.6–15.1)
GFR SERPL CREATININE-BSD FRML MDRD: 91 ML/MIN/1.73SQ M
GLUCOSE SERPL-MCNC: 98 MG/DL (ref 65–140)
HCT VFR BLD AUTO: 34.3 % (ref 36.5–49.3)
HGB BLD-MCNC: 11.4 G/DL (ref 12–17)
LYMPHOCYTES # BLD AUTO: 0.83 THOUSANDS/ΜL (ref 0.6–4.47)
LYMPHOCYTES NFR BLD AUTO: 22 % (ref 14–44)
MCH RBC QN AUTO: 30.4 PG (ref 26.8–34.3)
MCHC RBC AUTO-ENTMCNC: 33.2 G/DL (ref 31.4–37.4)
MCV RBC AUTO: 92 FL (ref 82–98)
MONOCYTES # BLD AUTO: 0.67 THOUSAND/ΜL (ref 0.17–1.22)
MONOCYTES NFR BLD AUTO: 18 % (ref 4–12)
NEUTROPHILS # BLD AUTO: 2.09 THOUSANDS/ΜL (ref 1.85–7.62)
NEUTS SEG NFR BLD AUTO: 56 % (ref 43–75)
NRBC BLD AUTO-RTO: 0 /100 WBCS
PLATELET # BLD AUTO: 197 THOUSANDS/UL (ref 149–390)
PMV BLD AUTO: 10.6 FL (ref 8.9–12.7)
POTASSIUM SERPL-SCNC: 3.8 MMOL/L (ref 3.5–5.3)
RBC # BLD AUTO: 3.75 MILLION/UL (ref 3.88–5.62)
SODIUM SERPL-SCNC: 144 MMOL/L (ref 136–145)
WBC # BLD AUTO: 3.72 THOUSAND/UL (ref 4.31–10.16)

## 2017-08-17 PROCEDURE — 80048 BASIC METABOLIC PNL TOTAL CA: CPT | Performed by: INTERNAL MEDICINE

## 2017-08-17 PROCEDURE — 85025 COMPLETE CBC W/AUTO DIFF WBC: CPT | Performed by: INTERNAL MEDICINE

## 2017-08-17 RX ORDER — AMOXICILLIN 500 MG/1
500 CAPSULE ORAL EVERY 8 HOURS SCHEDULED
Qty: 21 CAPSULE | Refills: 0 | Status: SHIPPED | OUTPATIENT
Start: 2017-08-17 | End: 2017-08-17

## 2017-08-17 RX ORDER — AMOXICILLIN 500 MG/1
500 CAPSULE ORAL EVERY 8 HOURS SCHEDULED
Status: DISCONTINUED | OUTPATIENT
Start: 2017-08-17 | End: 2017-08-17 | Stop reason: HOSPADM

## 2017-08-17 RX ORDER — AMOXICILLIN 500 MG/1
500 CAPSULE ORAL EVERY 8 HOURS SCHEDULED
Qty: 21 CAPSULE | Refills: 0 | Status: SHIPPED | OUTPATIENT
Start: 2017-08-17 | End: 2017-08-24

## 2017-08-17 RX ADMIN — FLUTICASONE PROPIONATE AND SALMETEROL 1 PUFF: 50; 250 POWDER RESPIRATORY (INHALATION) at 08:42

## 2017-08-17 RX ADMIN — VALSARTAN 80 MG: 160 TABLET ORAL at 08:42

## 2017-08-20 ENCOUNTER — GENERIC CONVERSION - ENCOUNTER (OUTPATIENT)
Dept: OTHER | Facility: OTHER | Age: 72
End: 2017-08-20

## 2017-08-20 LAB
BACTERIA BLD CULT: NORMAL
BACTERIA BLD CULT: NORMAL

## 2017-08-21 ENCOUNTER — GENERIC CONVERSION - ENCOUNTER (OUTPATIENT)
Dept: OTHER | Facility: OTHER | Age: 72
End: 2017-08-21

## 2017-08-25 ENCOUNTER — GENERIC CONVERSION - ENCOUNTER (OUTPATIENT)
Dept: OTHER | Facility: OTHER | Age: 72
End: 2017-08-25

## 2017-09-05 ENCOUNTER — GENERIC CONVERSION - ENCOUNTER (OUTPATIENT)
Dept: OTHER | Facility: OTHER | Age: 72
End: 2017-09-05

## 2017-09-15 DIAGNOSIS — N39.0 URINARY TRACT INFECTION: ICD-10-CM

## 2017-10-23 ENCOUNTER — ALLSCRIPTS OFFICE VISIT (OUTPATIENT)
Dept: OTHER | Facility: OTHER | Age: 72
End: 2017-10-23

## 2017-10-23 LAB
BILIRUB UR QL STRIP: NEGATIVE
CLARITY UR: NORMAL
COLOR UR: YELLOW
GLUCOSE (HISTORICAL): NEGATIVE
HGB UR QL STRIP.AUTO: NORMAL
KETONES UR STRIP-MCNC: NEGATIVE MG/DL
LEUKOCYTE ESTERASE UR QL STRIP: NORMAL
NITRITE UR QL STRIP: NEGATIVE
PH UR STRIP.AUTO: 7 [PH]
PROT UR STRIP-MCNC: NORMAL MG/DL
SP GR UR STRIP.AUTO: 1.02
UROBILINOGEN UR QL STRIP.AUTO: 0.2

## 2018-01-10 NOTE — MISCELLANEOUS
Message   Recorded as Task   Date: 08/25/2017 12:12 PM, Created By: Amadeo Yang   Task Name: Medical Complaint Callback   Assigned To: Kunal ARELLANO,TEAM   Regarding Patient: Deanna Anguiano, Status: Active   Comment:    Carmel Manjarrez - 25 Aug 2017 12:12 PM     TASK CREATED  Caller: Self; Medical Complaint; [phone] (Home)  PATIENT CALLED SAYING HE HAS HEMATURIA PLEASE CALL HIM BACK   Hoa Lebron - 25 Aug 2017 12:30 PM     TASK EDITED  Pt  had TURP 8/10  Having some light cherry red hematuria  Informed pt he may have off on and on due to procedure  To curtail activity and increase fluid intake, should eventually clear  to call if bleeding becomes worse, dev clots or unable to void  Active Problems    1  Acute lower urinary tract infection (599 0) (N39 0)   2  Acute ulcerative colitis, without complications (879 9) (S79 12)   3  Retention of urine (788 20) (R33 9)   4  Suspected UTI (599 0) (N39 0)    Current Meds   1  Advair Diskus 250-50 MCG/DOSE Inhalation Aerosol Powder Breath Activated; Therapy: (Recorded:[id number]) to Recorded   2  Crestor 10 MG Oral Tablet (Rosuvastatin Calcium); Therapy: (Recorded:39Rwy0657) to Recorded   3  Diovan HCT 80-12 5 MG Oral Tablet (Valsartan-Hydrochlorothiazide); Therapy: (Alton Frederick) to Recorded   4  Finasteride 5 MG Oral Tablet; Therapy: (Recorded:30Gto8617) to Recorded   5  Fluticasone Propionate 50 MCG/ACT Nasal Suspension; Therapy: (Alton Frederick) to Recorded   6  Singulair 10 MG Oral Tablet (Montelukast Sodium);    Therapy: (Alton Frederick) to Recorded    Signatures   Electronically signed by : Nick Love RN; Aug 25 2017 12:31PM EST                       (Author)

## 2018-01-11 NOTE — MISCELLANEOUS
Message   Recorded as Task   Date: 08/21/2017 03:11 PM, Created By: Angelika Mcnamara   Task Name: Call Back   Assigned To: Kunal BarraganB,TEAM   Regarding Patient: Jimena Phillips, Status: In Progress   Comment:    Carmel Manjarrez - 21 Aug 2017 3:11 PM     TASK CREATED  Caller: Self; Other; [phone] (Home)  patient called saying he was in the hospital for a blood infect after his prostate surgery  he has an appt scheduled in [address]  is that appt ok or should he come in sooner   Hoa Lebron - 21 Aug 2017 3:27 PM     TASK EDITED  Was admitted [address] 8/15 for 2 days for positive blood cultures  Presently on Amoxicillin until 8/24  Appt 9/6  Pt  questioning if needed to be seen earlier an if any additional testing to be done prior  Hoa Lebron - 21 Aug 2017 3:27 PM     TASK IN PROGRESS   Hoa Lebron - 21 Aug 2017 5:27 PM     TASK EDITED  Per Dr Cira Josue 9/6 appt okay  To re-culture 5 days after antibiotic  Slip faxed to [facility]  Active Problems    1  Acute ulcerative colitis, without complications (733 8) (S36 51)   2  Retention of urine (788 20) (R33 9)   3  Suspected UTI (599 0) (N39 0)    Current Meds   1  Advair Diskus 250-50 MCG/DOSE Inhalation Aerosol Powder Breath Activated; Therapy: (Recorded:15Uwg0411) to Recorded   2  Crestor 10 MG Oral Tablet (Rosuvastatin Calcium); Therapy: (Recorded:75Yuk9140) to Recorded   3  Diovan HCT 80-12 5 MG Oral Tablet (Valsartan-Hydrochlorothiazide); Therapy: (Valentina Dumont) to Recorded   4  Finasteride 5 MG Oral Tablet; Therapy: (Recorded:74Bdp0594) to Recorded   5  Fluticasone Propionate 50 MCG/ACT Nasal Suspension; Therapy: (Valentina Dumont) to Recorded   6  Singulair 10 MG Oral Tablet (Montelukast Sodium);    Therapy: (Valentina Dumont) to Recorded    Signatures   Electronically signed by : Artur Sterling RN; Aug 21 2017  5:30PM EST                       (Author)

## 2018-01-13 NOTE — RESULT NOTES
Verified Results  (1) TISSUE EXAM 10Aug2017 03:23PM Cecilia Liner     Test Name Result Flag Reference   LAB AP CASE REPORT (Report)     Surgical Pathology Report             Case: [id number]                   Authorizing Provider: Edu Jose MD   Collected:      08/10/2017 1523        Ordering Location:   [facility]    Received:      08/10/2017 [facility]                            Pathologist:      Luis Watson DO                               Specimen:  Prostate, PROSTATIC CHIPS   LAB AP FINAL DIAGNOSIS (Report)     A  Prostate, transurethral resection (23 grams): - Benign prostate tissue with acute inflammation  Intradepartmental consultation is in agreement with the above diagnosis   (SS)  Interpretation performed at , [address]    Electronically signed by Luis Watson DO on 8/15/2017 at 11:51 AM   LAB AP SURGICAL ADDITIONAL INFORMATION (Report)     All controls performed with the immunohistochemical stains reported above   reacted appropriately  These tests were developed and their performance   characteristics determined by [facility] or   [facility]  They may not be cleared or approved by the [place]  Food and Drug Administration  The FDA has determined that such clearance   or approval is not necessary  These tests are used for clinical purposes  They should not be regarded as investigational or for research  This   laboratory has been approved by CLIA 88, designated as a high-complexity   laboratory and is qualified to perform these tests  LAB AP GROSS DESCRIPTION (Report)     A  The specimen is received in formalin, labeled with the patient's name   and hospital number, and is designated prostatic chips, is a 23 g, 9 0   x 6 0 x 2 0 cm aggregate of multiple irregularly shaped fragments of   tan-pink and rubbery soft tissue admixed with clotted blood  Representative sections are submitted  12 cassettes  Note: The estimated total formalin fixation time based upon information   provided by the submitting clinician and the standard processing schedule   is 29 0 hours      RLR

## 2018-01-14 VITALS
SYSTOLIC BLOOD PRESSURE: 120 MMHG | WEIGHT: 183 LBS | DIASTOLIC BLOOD PRESSURE: 84 MMHG | HEIGHT: 72 IN | BODY MASS INDEX: 24.79 KG/M2

## 2018-01-14 VITALS
SYSTOLIC BLOOD PRESSURE: 144 MMHG | HEIGHT: 72 IN | WEIGHT: 179.38 LBS | BODY MASS INDEX: 24.3 KG/M2 | DIASTOLIC BLOOD PRESSURE: 82 MMHG

## 2018-01-15 NOTE — MISCELLANEOUS
Message  PT C/O HAVING PROSTATE SURGERY YESTERDAY AND HAS SMALL BLOOD COMING FROM AROUND CATH AND UNABLE TO MOVE BOWELS, TRIED COLACE  PT ADVISED NORMAL FOR BLOOD AFTER PROCEDURE AND TO TRY DIFFERENT STOOL SOFTENER      Active Problems    1  Acute ulcerative colitis, without complications (899 7) (S54 02)   2  Suspected UTI (599 0) (N39 0)    Current Meds   1  Advair Diskus 250-50 MCG/DOSE Inhalation Aerosol Powder Breath Activated; Therapy: (Recorded:76Tbl7643) to Recorded   2  Crestor 10 MG Oral Tablet (Rosuvastatin Calcium); Therapy: (Recorded:06Pgm7846) to Recorded   3  Diovan HCT 80-12 5 MG Oral Tablet (Valsartan-Hydrochlorothiazide); Therapy: (Danisha Mir) to Recorded   4  Finasteride 5 MG Oral Tablet; Therapy: (Recorded:16Qpb6386) to Recorded   5  Fluticasone Propionate 50 MCG/ACT Nasal Suspension; Therapy: (Danisha Mir) to Recorded   6  Singulair 10 MG Oral Tablet (Montelukast Sodium);    Therapy: (Danisha Mir) to Recorded    Signatures   Electronically signed by : Joan Del Rio, ; Aug 11 2017  4:08PM EST                       (Author)

## 2018-01-16 NOTE — MISCELLANEOUS
Message   Recorded as Task   Date: 08/15/2017 03:41 PM, Created By: Artemio Liu   Task Name: Miscellaneous   Assigned To: Kunal ARELLANO,TEAM   Regarding Patient: Kacie Roe, Status: Active   CommentTyrus Sins - 15 Aug 2017 3:41 PM     TASK CREATED  Caller: Erika White; [phone]  Wife called to inform Bello Browne pt being admitted to [facility] as they were instructed to go to ER earlier today  Hoa Lebron - 15 Aug 2017 3:47 PM     TASK EDITED  Will notify Dr Kacie Paredes  Active Problems    1  Acute ulcerative colitis, without complications (320 6) (P30 80)   2  Retention of urine (788 20) (R33 9)   3  Suspected UTI (599 0) (N39 0)    Current Meds   1  Advair Diskus 250-50 MCG/DOSE Inhalation Aerosol Powder Breath Activated; Therapy: (Recorded:42Qvl4337) to Recorded   2  Crestor 10 MG Oral Tablet (Rosuvastatin Calcium); Therapy: (Recorded:34Hls4360) to Recorded   3  Diovan HCT 80-12 5 MG Oral Tablet (Valsartan-Hydrochlorothiazide); Therapy: (Dilma Bellow) to Recorded   4  Finasteride 5 MG Oral Tablet; Therapy: (Recorded:51Ofy9346) to Recorded   5  Fluticasone Propionate 50 MCG/ACT Nasal Suspension; Therapy: (Dilma Bellow) to Recorded   6  Singulair 10 MG Oral Tablet (Montelukast Sodium);    Therapy: (Dilma Bellow) to Recorded    Signatures   Electronically signed by : Clau Conrad RN; Aug 15 2017  3:48PM EST                       (Author)

## 2018-01-17 NOTE — PROGRESS NOTES
Chief Complaint  Pt presents for post op Jimenez removal s/p TURP  Meatus is clean, dry, intact, free of drainage or debris  Urine in bag is clear, yellow  Pt has no c/o today  Jimenez removed without difficulty  Pt expressed understanding of all instructions  He will call with questions or if unable to void by 3:00 pm       Active Problems    1  Acute ulcerative colitis, without complications (870 4) (N08 50)   2  Suspected UTI (599 0) (N39 0)    Current Meds   1  Advair Diskus 250-50 MCG/DOSE Inhalation Aerosol Powder Breath Activated; Therapy: (Recorded:36Rki1207) to Recorded   2  Crestor 10 MG Oral Tablet; Therapy: (Recorded:62Git2311) to Recorded   3  Diovan HCT 80-12 5 MG Oral Tablet; Therapy: (Sushant Yu) to Recorded   4  Finasteride 5 MG Oral Tablet; Therapy: (Recorded:07Wtu7228) to Recorded   5  Fluticasone Propionate 50 MCG/ACT Nasal Suspension; Therapy: (Sushant Yu) to Recorded   6  Singulair 10 MG Oral Tablet; Therapy: (Recorded:73Rls3968) to Recorded    Assessment    1   Retention of urine (278 20) (R33 9)    Future Appointments    Date/Time Provider Specialty Site   09/06/2017 10:00 AM Emani Parisi MD Urology [address]   09/06/2017 01:30 PM Emani Parisi MD Urology [address]     Signatures   Electronically signed by : Aubree Brewer, ; Aug 14 2017  9:32AM EST                       (Author)    Electronically signed by : Sherryle Alice, MD; Aug 14 2017  3:48PM EST                       (Author)

## 2018-01-18 NOTE — PROCEDURES
Procedures by Verónica Lopez MD at 8/10/2017  1:13 PM      Author:  Verónica Lopez MD Service:  Urology Author Type:  Physician    Filed:  2017 10:06 AM Date of Service:  8/10/2017  1:13 PM Status:  Signed    :  Verónica Lopez MD (Physician)         Procedures        OPERATIVE REPORT    PATIENT NAME: Mary Fountain      :  1945    MRN: [id number]    Patient Location: [facility] OR ROOM 01    Surgery Date: 8/10/2017    Surgeon: Verónica Lopez MD    Assistant: None    Preop Diagnosis:    Enlarged prostate with lower urinary tract symptoms (LUTS) [N40 1]    Post-Op Diagnosis Codes:     * Enlarged prostate with lower urinary tract symptoms (LUTS) [N40 1]    Procedure(s) (LRB):  TRANSURETHRAL RESECTION OF PROSTATE W/ LASER (N/A)    Procedure: Cystoscopy and Photovaporization of the Prostate with the Greenlight laser    Specimen(s):  * No specimens in log *      Estimated Blood Loss:  Minimal    Drains:  None       Anesthesia Type:   General    Operative Indications:  Enlarged prostate with lower urinary tract symptoms (LUTS) [N40 1]  This is a 74 y o  male presenting with Enlarged prostate with lower urinary tract symptoms (LUTS) [N40 1]  Operative Findings:         Complications:   None    Procedure and Technique:  parThe patient was brought to the operating room and identified as Mary Fountain  General anesthesia was administered and the patient was placed in Lithotomy position  The  patient was prepped and draped in the usual sterile fashion  Compression boots were employed  IV antibiotics administered and time out performed  Cystoscopy was performed with the 23 [place] continuous flow cystoscope  Findings are as above  Ureteral orifices and veru montanum were identified  The greenlight laser fiber was placed through the scope  Vaporization began on the bladder neck at 80 bardales  power  The bladder neck was vaporized circumferentially   Right and left lateral lobes were vaporized to level of capsular fibers  Power was gradually raised to ensure adequate vaporization  Anterior and apical tissue were lasered  No vaporization was performed  distal to the veru  When vaporization was complete hemostasis was confirmed and any bleeders cauterized  The ureteral orifices were identified and unaffected by laser energy  The bladder was left  full and cystoscope removed  A 24 F herman catheter was placed and balloon filled to 45cc  The catheter irrigated well and returned light pink to clear urine  The herman was placed to straight drainage  The patient was awakened from anesthesia  He tolerated the procedure well and was taken to the recovery room in satisfactory condition     par    Patient Disposition:  PACU     SIGNATURE: Dorma Epley, MD  DATE: August 10, 2017  TIME: 1:16 PM             Received for:Juan Moncada MD  Aug 17 2017 10:06AM Eastern Standard Time

## 2018-01-22 VITALS
DIASTOLIC BLOOD PRESSURE: 78 MMHG | BODY MASS INDEX: 20.48 KG/M2 | SYSTOLIC BLOOD PRESSURE: 132 MMHG | HEIGHT: 78 IN | WEIGHT: 177 LBS

## 2018-08-31 ENCOUNTER — TELEPHONE (OUTPATIENT)
Dept: UROLOGY | Facility: AMBULATORY SURGERY CENTER | Age: 73
End: 2018-08-31

## 2018-08-31 DIAGNOSIS — R97.20 ELEVATED PSA: Primary | ICD-10-CM

## 2018-08-31 NOTE — TELEPHONE ENCOUNTER
Contacted pt and he stated that he saw his PCP and he had his PSA done 08/24/18 @ [facility] and stated that it was 5 4  Called [facility] and having them fax us a copy of PSA and will forward to Dr Alejandra Egan to advise

## 2018-10-02 ENCOUNTER — APPOINTMENT (OUTPATIENT)
Dept: LAB | Facility: MEDICAL CENTER | Age: 73
End: 2018-10-02
Attending: UROLOGY
Payer: COMMERCIAL

## 2018-10-02 ENCOUNTER — TRANSCRIBE ORDERS (OUTPATIENT)
Dept: ADMINISTRATIVE | Facility: HOSPITAL | Age: 73
End: 2018-10-02

## 2018-10-02 DIAGNOSIS — R97.20 ELEVATED PROSTATE SPECIFIC ANTIGEN (PSA): ICD-10-CM

## 2018-10-02 DIAGNOSIS — R97.20 ELEVATED PROSTATE SPECIFIC ANTIGEN (PSA): Primary | ICD-10-CM

## 2018-10-02 PROCEDURE — 36415 COLL VENOUS BLD VENIPUNCTURE: CPT

## 2018-10-02 PROCEDURE — 84153 ASSAY OF PSA TOTAL: CPT

## 2018-10-02 PROCEDURE — 84154 ASSAY OF PSA FREE: CPT

## 2018-10-03 LAB
PSA FREE MFR SERPL: 18.2 %
PSA FREE SERPL-MCNC: 1 NG/ML
PSA SERPL-MCNC: 5.5 NG/ML (ref 0–4)

## 2018-10-29 ENCOUNTER — OFFICE VISIT (OUTPATIENT)
Dept: UROLOGY | Facility: MEDICAL CENTER | Age: 73
End: 2018-10-29
Payer: COMMERCIAL

## 2018-10-29 VITALS
DIASTOLIC BLOOD PRESSURE: 82 MMHG | WEIGHT: 185 LBS | HEIGHT: 71 IN | SYSTOLIC BLOOD PRESSURE: 126 MMHG | BODY MASS INDEX: 25.9 KG/M2

## 2018-10-29 DIAGNOSIS — Z71.89 OTHER SPECIFIED COUNSELING: ICD-10-CM

## 2018-10-29 DIAGNOSIS — R97.20 ELEVATED PSA: ICD-10-CM

## 2018-10-29 DIAGNOSIS — N40.0 BPH WITHOUT URINARY OBSTRUCTION: Primary | ICD-10-CM

## 2018-10-29 LAB
SL AMB  POCT GLUCOSE, UA: NEGATIVE
SL AMB LEUKOCYTE ESTERASE,UA: NEGATIVE
SL AMB POCT BILIRUBIN,UA: NEGATIVE
SL AMB POCT BLOOD,UA: NEGATIVE
SL AMB POCT CLARITY,UA: CLEAR
SL AMB POCT COLOR,UA: YELLOW
SL AMB POCT KETONES,UA: NEGATIVE
SL AMB POCT NITRITE,UA: NEGATIVE
SL AMB POCT PH,UA: 7
SL AMB POCT SPECIFIC GRAVITY,UA: 1.01
SL AMB POCT URINE PROTEIN: NEGATIVE
SL AMB POCT UROBILINOGEN: 0.2

## 2018-10-29 PROCEDURE — 99214 OFFICE O/P EST MOD 30 MIN: CPT | Performed by: UROLOGY

## 2018-10-29 PROCEDURE — 81003 URINALYSIS AUTO W/O SCOPE: CPT | Performed by: UROLOGY

## 2018-10-29 RX ORDER — DOXYCYCLINE 100 MG/1
100 CAPSULE ORAL 2 TIMES DAILY
Qty: 28 CAPSULE | Refills: 0 | Status: SHIPPED | OUTPATIENT
Start: 2018-10-29 | End: 2018-11-12

## 2018-10-29 NOTE — PROGRESS NOTES
Assessment/Plan:    Elevated PSA   The patient will be given a course of antibiotics, but if his PSA does not return to historic levels, we will consider  ultrasound with biopsy  BPH without urinary obstruction    Minimal voiding symptoms  Recheck in 1 year  Diagnoses and all orders for this visit:    BPH without urinary obstruction  -     POCT urine dip auto non-scope    Elevated PSA  -     PSA, total and free; Future  -     doxycycline monohydrate (MONODOX) 100 mg capsule; Take 1 capsule (100 mg total) by mouth 2 (two) times a day for 14 days    Other specified counseling          Subjective:      Patient ID: Jose Calabrese is a 74 y o  male  HPI  BPH without obstruction:  The patient had a transurethral resection of his prostate on August 8, 2017  Pathology showed benign tissue with inflammation  Postoperatively, his finasteride and tamsulosin were discontinued  He notes nocturia x 1, weak stream   Not always awakened by need to void  He denies other significant urinary symptoms  He denies gross hematuria, urinary tract infections or incontinence  He is taking neither medications nor supplements for his symptoms  Stream is more like it is draining than being expelled  Stream varies  AUA SYMPTOM SCORE      Most Recent Value   AUA SYMPTOM SCORE   How often have you had a sensation of not emptying your bladder completely after you finished urinating? 1   How often have you had to urinate again less than two hours after you finished urinating? 1   How often have you found you stopped and started again several times when you urinate? 1   How often have you found it difficult to postpone urination? 1   How often have you had a weak urinary stream?  1   How often have you had to push or strain to begin urination? 0   How many times did you most typically get up to urinate from the time you went to bed at night until the time you got up in the morning?   1   Quality of Life: If you were to spend the rest of your life with your urinary condition just the way it is now, how would you feel about that?  2   AUA SYMPTOM SCORE  6        Elevated PSA:   Current PSA level is  5 4 on August 31, 2018  This was checked again more recently in and was 5 2  Prior PSAs have been about 1 4  Not sexually active  No urethral discharge  (The patient has 2 charts and some of his records may be in the other chart  Efforts will be made to consolidate these 2 medical records  )    The patient will be placed on a course of antibiotics and will recheck his PSA once again  If it remains elevated, we will discuss a prostate ultrasound and biopsy  He had a transurethral resection just a year ago, in all that tissue was benign  Accompanied by wife  The following portions of the patient's history were reviewed and updated as appropriate: allergies, current medications, past family history, past medical history, past social history, past surgical history and problem list     Review of Systems   Constitutional: Negative for activity change and fatigue  Respiratory: Negative for shortness of breath and wheezing  Asthma  Cardiovascular: Negative for chest pain  Gastrointestinal: Negative for abdominal pain  Genitourinary: Negative for difficulty urinating, dysuria, frequency, hematuria and urgency  Musculoskeletal: Negative for back pain and gait problem  Skin: Negative  Allergic/Immunologic: Negative  Neurological: Negative  Psychiatric/Behavioral: Negative  Objective:      /82 (BP Location: Left arm, Patient Position: Sitting, Cuff Size: Standard)   Ht 5' 11" (1 803 m)   Wt 83 9 kg (185 lb)   BMI 25 80 kg/m²          Physical Exam   Constitutional: He is oriented to person, place, and time  He appears well-developed and well-nourished  HENT:   Head: Normocephalic and atraumatic  Eyes: EOM are normal    Neck: Normal range of motion  Neck supple  Pulmonary/Chest: Effort normal    Genitourinary: Rectum normal    Genitourinary Comments: The prostate is 30 gm, soft, smooth, non-tender  Musculoskeletal: Normal range of motion  Neurological: He is alert and oriented to person, place, and time  Skin: Skin is warm and dry  Psychiatric: He has a normal mood and affect   His behavior is normal  Judgment and thought content normal

## 2018-10-29 NOTE — LETTER
October 29, 2018     [place] [place] [id number]    Patient: Yesi Tucker   YOB: 1945   Date of Visit: 10/29/2018       Dear Dr Carly Kong:    Thank you for referring Isabella Mack to me for evaluation  Below are my notes for this consultation  If you have questions, please do not hesitate to call me  I look forward to following your patient along with you  Sincerely,        Brayden Proctor MD        CC: No Recipients  Brayden Proctor MD  10/29/2018  1:38 PM  Sign at close encounter  Assessment/Plan:    Elevated PSA   The patient will be given a course of antibiotics, but if his PSA does not return to historic levels, we will consider  ultrasound with biopsy  BPH without urinary obstruction    Minimal voiding symptoms  Recheck in 1 year  Diagnoses and all orders for this visit:    BPH without urinary obstruction  -     POCT urine dip auto non-scope    Elevated PSA  -     PSA, total and free; Future  -     doxycycline monohydrate (MONODOX) 100 mg capsule; Take 1 capsule (100 mg total) by mouth 2 (two) times a day for 14 days    Other specified counseling          Subjective:      Patient ID: Yesi Tucker is a 74 y o  male  HPI  BPH without obstruction:  The patient had a transurethral resection of his prostate on August 8, 2017  Pathology showed benign tissue with inflammation  Postoperatively, his finasteride and tamsulosin were discontinued  He notes nocturia x 1, weak stream   Not always awakened by need to void  He denies other significant urinary symptoms  He denies gross hematuria, urinary tract infections or incontinence  He is taking neither medications nor supplements for his symptoms  Stream is more like it is draining than being expelled  Stream varies  AUA SYMPTOM SCORE      Most Recent Value   AUA SYMPTOM SCORE   How often have you had a sensation of not emptying your bladder completely after you finished urinating? 1   How often have you had to urinate again less than two hours after you finished urinating? 1   How often have you found you stopped and started again several times when you urinate? 1   How often have you found it difficult to postpone urination? 1   How often have you had a weak urinary stream?  1   How often have you had to push or strain to begin urination? 0   How many times did you most typically get up to urinate from the time you went to bed at night until the time you got up in the morning? 1   Quality of Life: If you were to spend the rest of your life with your urinary condition just the way it is now, how would you feel about that?  2   AUA SYMPTOM SCORE  6        Elevated PSA:   Current PSA level is  5 4 on August 31, 2018  This was checked again more recently in and was 5 2  Prior PSAs have been about 1 4  Not sexually active  No urethral discharge  (The patient has 2 charts and some of his records may be in the other chart  Efforts will be made to consolidate these 2 medical records  )    The patient will be placed on a course of antibiotics and will recheck his PSA once again  If it remains elevated, we will discuss a prostate ultrasound and biopsy  He had a transurethral resection just a year ago, in all that tissue was benign  Accompanied by wife  The following portions of the patient's history were reviewed and updated as appropriate: allergies, current medications, past family history, past medical history, past social history, past surgical history and problem list     Review of Systems   Constitutional: Negative for activity change and fatigue  Respiratory: Negative for shortness of breath and wheezing  Asthma  Cardiovascular: Negative for chest pain  Gastrointestinal: Negative for abdominal pain  Genitourinary: Negative for difficulty urinating, dysuria, frequency, hematuria and urgency  Musculoskeletal: Negative for back pain and gait problem  Skin: Negative  Allergic/Immunologic: Negative  Neurological: Negative  Psychiatric/Behavioral: Negative  Objective:      /82 (BP Location: Left arm, Patient Position: Sitting, Cuff Size: Standard)   Ht 5' 11" (1 803 m)   Wt 83 9 kg (185 lb)   BMI 25 80 kg/m²           Physical Exam   Constitutional: He is oriented to person, place, and time  He appears well-developed and well-nourished  HENT:   Head: Normocephalic and atraumatic  Eyes: EOM are normal    Neck: Normal range of motion  Neck supple  Pulmonary/Chest: Effort normal    Genitourinary: Rectum normal    Genitourinary Comments: The prostate is 30 gm, soft, smooth, non-tender  Musculoskeletal: Normal range of motion  Neurological: He is alert and oriented to person, place, and time  Skin: Skin is warm and dry  Psychiatric: He has a normal mood and affect   His behavior is normal  Judgment and thought content normal

## 2018-11-29 ENCOUNTER — APPOINTMENT (OUTPATIENT)
Dept: LAB | Facility: MEDICAL CENTER | Age: 73
End: 2018-11-29
Attending: UROLOGY
Payer: COMMERCIAL

## 2018-11-29 DIAGNOSIS — R97.20 ELEVATED PSA: ICD-10-CM

## 2018-11-29 PROCEDURE — 36415 COLL VENOUS BLD VENIPUNCTURE: CPT

## 2018-11-29 PROCEDURE — 84154 ASSAY OF PSA FREE: CPT

## 2018-11-29 PROCEDURE — 84153 ASSAY OF PSA TOTAL: CPT

## 2018-11-30 LAB
PSA FREE MFR SERPL: 17.7 %
PSA FREE SERPL-MCNC: 0.83 NG/ML
PSA SERPL-MCNC: 4.7 NG/ML (ref 0–4)

## 2018-12-03 ENCOUNTER — TELEPHONE (OUTPATIENT)
Dept: UROLOGY | Facility: MEDICAL CENTER | Age: 73
End: 2018-12-03

## 2018-12-03 NOTE — TELEPHONE ENCOUNTER
----- Message from Ignacio Jackson MD sent at 12/1/2018 11:13 AM EST -----  Please call the patient regarding his abnormal result  The patient's PSA came down from 5 5 to 4 7  His baseline PSA is approximately 1 5  Please contact the patient and have him make an appointment to discuss a prostate ultrasound and biopsy  Thank you

## 2018-12-05 ENCOUNTER — OFFICE VISIT (OUTPATIENT)
Dept: UROLOGY | Facility: MEDICAL CENTER | Age: 73
End: 2018-12-05
Payer: COMMERCIAL

## 2018-12-05 VITALS
HEART RATE: 74 BPM | DIASTOLIC BLOOD PRESSURE: 84 MMHG | HEIGHT: 71 IN | BODY MASS INDEX: 26.18 KG/M2 | WEIGHT: 187 LBS | SYSTOLIC BLOOD PRESSURE: 146 MMHG

## 2018-12-05 DIAGNOSIS — R97.20 ELEVATED PSA: Primary | ICD-10-CM

## 2018-12-05 DIAGNOSIS — Z71.89 OTHER SPECIFIED COUNSELING: ICD-10-CM

## 2018-12-05 PROCEDURE — 99213 OFFICE O/P EST LOW 20 MIN: CPT | Performed by: UROLOGY

## 2018-12-05 RX ORDER — CIPROFLOXACIN 500 MG/1
500 TABLET, FILM COATED ORAL EVERY 12 HOURS SCHEDULED
Qty: 6 TABLET | Refills: 0 | Status: SHIPPED | OUTPATIENT
Start: 2018-12-05 | End: 2018-12-08

## 2018-12-05 RX ORDER — CEPHALEXIN 500 MG/1
500 CAPSULE ORAL 4 TIMES DAILY
Qty: 12 CAPSULE | Refills: 0 | Status: SHIPPED | OUTPATIENT
Start: 2018-12-05 | End: 2018-12-08

## 2018-12-05 NOTE — PROGRESS NOTES
Assessment/Plan:    Elevated PSA   Prostate ultrasound with biopsy scheduled  Diagnoses and all orders for this visit:    Elevated PSA  -     cephalexin (KEFLEX) 500 mg capsule; Take 1 capsule (500 mg total) by mouth 4 (four) times a day for 3 days  -     ciprofloxacin (CIPRO) 500 mg tablet; Take 1 tablet (500 mg total) by mouth every 12 (twelve) hours for 3 days Begin the day before biopsy  Other specified counseling          Subjective:      Patient ID: Kelvin Rubin is a 74 y o  male  HPI  Elevated PSA:    His PSA was 5 4 on August 31, 2018 and checked once after that and it was 5 2  He was placed on a course of antibiotics and his current PSA is 4 7  Prior PSAs have been approximately 1 4  The patient returns today with his wife  to discuss further workup of his elevated PSA  Procedure discussed in detail  The patient agrees to a prostate ultrasound with biopsy  The following portions of the patient's history were reviewed and updated as appropriate: allergies, current medications, past family history, past medical history, past social history, past surgical history and problem list     Review of Systems    Constitutional: Negative for activity change and fatigue  Respiratory: Negative for shortness of breath and wheezing  Asthma  Cardiovascular: Negative for chest pain  Gastrointestinal: Negative for abdominal pain  Genitourinary: Negative for difficulty urinating, dysuria, frequency, hematuria and urgency  Musculoskeletal: Negative for back pain and gait problem  Skin: Negative  Allergic/Immunologic: Negative  Neurological: Negative  Psychiatric/Behavioral: Negative  Objective:      /84 (BP Location: Left arm, Patient Position: Sitting, Cuff Size: Standard)   Pulse 74   Ht 5' 11" (1 803 m)   Wt 84 8 kg (187 lb)   BMI 26 08 kg/m²          Physical Exam   Constitutional: He is oriented to person, place, and time   He appears well-developed and well-nourished  HENT:   Head: Normocephalic and atraumatic  Eyes: EOM are normal    Neck: Normal range of motion  Pulmonary/Chest: Effort normal    Musculoskeletal: Normal range of motion  Neurological: He is alert and oriented to person, place, and time  Skin: Skin is warm and dry  Psychiatric: He has a normal mood and affect   His behavior is normal  Judgment and thought content normal

## 2018-12-05 NOTE — LETTER
December 5, 2018     Harvey Waller, [address] [address]    Patient: Kaylyn Warren   YOB: 1945   Date of Visit: 12/5/2018       Dear Dr Albino Aguila:    Thank you for referring Jeni Villavicencio to me for evaluation  Below are my notes for this consultation  If you have questions, please do not hesitate to call me  I look forward to following your patient along with you  Sincerely,        Stanton Francois MD        CC: No Recipients  Stanton Francois MD  12/5/2018  2:27 PM  Sign at close encounter  Assessment/Plan:    Elevated PSA   Prostate ultrasound with biopsy scheduled  Diagnoses and all orders for this visit:    Elevated PSA  -     cephalexin (KEFLEX) 500 mg capsule; Take 1 capsule (500 mg total) by mouth 4 (four) times a day for 3 days  -     ciprofloxacin (CIPRO) 500 mg tablet; Take 1 tablet (500 mg total) by mouth every 12 (twelve) hours for 3 days Begin the day before biopsy  Other specified counseling          Subjective:      Patient ID: Kaylyn Warren is a 74 y o  male  HPI  Elevated PSA:    His PSA was 5 4 on August 31, 2018 and checked once after that and it was 5 2  He was placed on a course of antibiotics and his current PSA is 4 7  Prior PSAs have been approximately 1 4  The patient returns today with his wife  to discuss further workup of his elevated PSA  Procedure discussed in detail  The patient agrees to a prostate ultrasound with biopsy  The following portions of the patient's history were reviewed and updated as appropriate: allergies, current medications, past family history, past medical history, past social history, past surgical history and problem list     Review of Systems    Constitutional: Negative for activity change and fatigue  Respiratory: Negative for shortness of breath and wheezing  Asthma  Cardiovascular: Negative for chest pain  Gastrointestinal: Negative for abdominal pain     Genitourinary: Negative for difficulty urinating, dysuria, frequency, hematuria and urgency  Musculoskeletal: Negative for back pain and gait problem  Skin: Negative  Allergic/Immunologic: Negative  Neurological: Negative  Psychiatric/Behavioral: Negative  Objective:      /84 (BP Location: Left arm, Patient Position: Sitting, Cuff Size: Standard)   Pulse 74   Ht 5' 11" (1 803 m)   Wt 84 8 kg (187 lb)   BMI 26 08 kg/m²           Physical Exam   Constitutional: He is oriented to person, place, and time  He appears well-developed and well-nourished  HENT:   Head: Normocephalic and atraumatic  Eyes: EOM are normal    Neck: Normal range of motion  Pulmonary/Chest: Effort normal    Musculoskeletal: Normal range of motion  Neurological: He is alert and oriented to person, place, and time  Skin: Skin is warm and dry  Psychiatric: He has a normal mood and affect   His behavior is normal  Judgment and thought content normal

## 2019-01-02 ENCOUNTER — TELEPHONE (OUTPATIENT)
Dept: UROLOGY | Facility: AMBULATORY SURGERY CENTER | Age: 74
End: 2019-01-02

## 2019-01-02 NOTE — TELEPHONE ENCOUNTER
Pt was concerned because he will be moving and his drive will be around 1 1/2 hours away  Pt instructed that is ok

## 2019-01-08 ENCOUNTER — PROCEDURE VISIT (OUTPATIENT)
Dept: UROLOGY | Facility: MEDICAL CENTER | Age: 74
End: 2019-01-08
Payer: COMMERCIAL

## 2019-01-08 VITALS
HEIGHT: 71 IN | WEIGHT: 190 LBS | DIASTOLIC BLOOD PRESSURE: 84 MMHG | BODY MASS INDEX: 26.6 KG/M2 | SYSTOLIC BLOOD PRESSURE: 132 MMHG | HEART RATE: 77 BPM

## 2019-01-08 DIAGNOSIS — R97.20 ELEVATED PSA: Primary | ICD-10-CM

## 2019-01-08 LAB
SL AMB  POCT GLUCOSE, UA: NEGATIVE
SL AMB LEUKOCYTE ESTERASE,UA: NEGATIVE
SL AMB POCT BILIRUBIN,UA: NEGATIVE
SL AMB POCT BLOOD,UA: ABNORMAL
SL AMB POCT CLARITY,UA: CLEAR
SL AMB POCT COLOR,UA: YELLOW
SL AMB POCT KETONES,UA: NEGATIVE
SL AMB POCT NITRITE,UA: NEGATIVE
SL AMB POCT PH,UA: 7
SL AMB POCT SPECIFIC GRAVITY,UA: 1.01
SL AMB POCT URINE PROTEIN: NEGATIVE
SL AMB POCT UROBILINOGEN: 0.2

## 2019-01-08 PROCEDURE — 88344 IMHCHEM/IMCYTCHM EA MLT ANTB: CPT | Performed by: PATHOLOGY

## 2019-01-08 PROCEDURE — 55700 PR BIOPSY OF PROSTATE,NEEDLE/PUNCH: CPT | Performed by: UROLOGY

## 2019-01-08 PROCEDURE — 76942 ECHO GUIDE FOR BIOPSY: CPT | Performed by: UROLOGY

## 2019-01-08 PROCEDURE — 81003 URINALYSIS AUTO W/O SCOPE: CPT | Performed by: UROLOGY

## 2019-01-08 PROCEDURE — G0416 PROSTATE BIOPSY, ANY MTHD: HCPCS | Performed by: PATHOLOGY

## 2019-01-08 RX ORDER — IRBESARTAN 150 MG/1
150 TABLET ORAL DAILY
Refills: 0 | COMMUNITY
Start: 2019-01-05

## 2019-01-08 NOTE — LETTER
January 8, 2019     [place] [address]    Patient: Jennifer Burch   YOB: 1945   Date of Visit: 1/8/2019       Dear Dr Lala Morris:    Thank you for referring Bettina Kim to me for evaluation  Below are my notes for this consultation  If you have questions, please do not hesitate to call me  I look forward to following your patient along with you  Sincerely,        Mamadou Valle MD        CC: No Recipients  Mamadou Valle MD  1/8/2019 10:41 AM  Sign at close encounter  Procedures      Preoperative diagnosis:  Elevated PSA     Postoperative diagnosis: Same    Operation:  Transrectal ultrasound of the prostate with biopsy    Surgeon: Rafaela Stern MD, FACS    Anesthesia:  Local    Procedure: The patient was placed on the examining table with his left side down  Xylocaine jelly, 10 cc,  was instilled into the rectum for mucosal analgesia and anesthesia  The ultrasound probe was inserted  Five mL of 1% xylocaine without epinephrine was infiltrated into each neurovascular bundle in the groove between the base of the prostate and the seminal vesicles  A total of 10 mL was injected  Ultrasound images were obtained scanning from the base to the apex and from the left side to the right side  The prostate volume was 143 (One hundred forty-three) g  No hypoechoic lesions were identified  Twelve biopsies were taken per protocol  The procedure was completed  The patient tolerated well  He was discharged home in satisfactory condition  Blood loss was insignificant  The patient was advised to complete his antibiotics  He was alerted to the possibility of hematuria, blood per rectum and blood his semen  The patient's prostate is enormous  This could account for his current PSA level  I am possible by the fact that his previous PSAs were so low  Pathology pending  The patient is accompanied by his wife

## 2019-01-08 NOTE — PROGRESS NOTES
Procedures      Preoperative diagnosis:  Elevated PSA     Postoperative diagnosis: Same    Operation:  Transrectal ultrasound of the prostate with biopsy    Surgeon: Kamryn Morris MD, FACS    Anesthesia:  Local    Procedure: The patient was placed on the examining table with his left side down  Xylocaine jelly, 10 cc,  was instilled into the rectum for mucosal analgesia and anesthesia  The ultrasound probe was inserted  Five mL of 1% xylocaine without epinephrine was infiltrated into each neurovascular bundle in the groove between the base of the prostate and the seminal vesicles  A total of 10 mL was injected  Ultrasound images were obtained scanning from the base to the apex and from the left side to the right side  The prostate volume was 143 (One hundred forty-three) g  No hypoechoic lesions were identified  Twelve biopsies were taken per protocol  The procedure was completed  The patient tolerated well  He was discharged home in satisfactory condition  Blood loss was insignificant  The patient was advised to complete his antibiotics  He was alerted to the possibility of hematuria, blood per rectum and blood his semen  The patient's prostate is enormous  This could account for his current PSA level  I am possible by the fact that his previous PSAs were so low  Pathology pending  The patient is accompanied by his wife

## 2019-01-14 ENCOUNTER — TELEPHONE (OUTPATIENT)
Dept: UROLOGY | Facility: MEDICAL CENTER | Age: 74
End: 2019-01-14

## 2019-01-14 NOTE — TELEPHONE ENCOUNTER
----- Message from Kaci Gutierres MD sent at 1/14/2019  2:09 PM EST -----  Pathology benign  Please inform the patient  Thank you

## 2019-01-14 NOTE — TELEPHONE ENCOUNTER
Spoke with the patient; Prostate BX found no cancer  Patient is still to keep his follow-up appointment to discuss the results with the doctor in more detail

## 2019-01-17 ENCOUNTER — OFFICE VISIT (OUTPATIENT)
Dept: UROLOGY | Facility: MEDICAL CENTER | Age: 74
End: 2019-01-17
Payer: COMMERCIAL

## 2019-01-17 VITALS
BODY MASS INDEX: 26.5 KG/M2 | SYSTOLIC BLOOD PRESSURE: 140 MMHG | HEIGHT: 71 IN | DIASTOLIC BLOOD PRESSURE: 80 MMHG | HEART RATE: 80 BPM

## 2019-01-17 DIAGNOSIS — N40.0 ENLARGED PROSTATE: ICD-10-CM

## 2019-01-17 DIAGNOSIS — R97.20 ELEVATED PSA: Primary | ICD-10-CM

## 2019-01-17 LAB
SL AMB  POCT GLUCOSE, UA: ABNORMAL
SL AMB LEUKOCYTE ESTERASE,UA: ABNORMAL
SL AMB POCT BILIRUBIN,UA: ABNORMAL
SL AMB POCT BLOOD,UA: ABNORMAL
SL AMB POCT CLARITY,UA: CLEAR
SL AMB POCT COLOR,UA: YELLOW
SL AMB POCT KETONES,UA: ABNORMAL
SL AMB POCT NITRITE,UA: ABNORMAL
SL AMB POCT PH,UA: 7.5
SL AMB POCT SPECIFIC GRAVITY,UA: 1.01
SL AMB POCT URINE PROTEIN: ABNORMAL
SL AMB POCT UROBILINOGEN: 0.2

## 2019-01-17 PROCEDURE — 99213 OFFICE O/P EST LOW 20 MIN: CPT | Performed by: UROLOGY

## 2019-01-17 PROCEDURE — 81003 URINALYSIS AUTO W/O SCOPE: CPT | Performed by: UROLOGY

## 2019-01-17 NOTE — PROGRESS NOTES
Assessment/Plan:      Diagnoses and all orders for this visit:    Elevated PSA  -     POCT urine dip auto non-scope  -     PSA, total and free; Future    Enlarged prostate  -     PSA, total and free; Future        Plan:  I informed the patient that Dr Adriano Laboy was ill and not present for his appointment today, so I would and did discuss the finding of no cancer in his recent prostate biopsy  He appeared to be somewhat annoyed that he came into the office for discussion concerning these findings when he had been already informed of these by Dr Adriano Laboy over the phone  Subjective:  No complaints     Patient ID: Ulises Kenyon is a 74 y o  male  HPI  Recently status post prostate biopsy for a PSA of 4 7  The results are as listed below, showed no evidence of cancer  He has a prior history of BPH as supported by his ultrasound at the time of his biopsy, and he is status post TURP by Dr Adriano Laboy several years previously      The following portions of the patient's history were reviewed and updated as appropriate: allergies, current medications, past family history, past medical history, past social history, past surgical history and problem list     Review of Systems   Constitutional: Negative  HENT: Negative  Eyes: Negative  Respiratory: Negative  Cardiovascular: Negative  Gastrointestinal: Negative  Endocrine: Negative  Genitourinary: Negative  Musculoskeletal: Negative  Skin: Negative  Allergic/Immunologic: Negative  Neurological: Negative  Hematological: Negative  Psychiatric/Behavioral: Negative  Objective:     Physical Exam   Constitutional: He is oriented to person, place, and time  He appears well-developed and well-nourished  No distress  HENT:   Head: Normocephalic and atraumatic  Nose: Nose normal    Mouth/Throat: Oropharynx is clear and moist    Eyes: Pupils are equal, round, and reactive to light   Conjunctivae and EOM are normal  No scleral icterus  Neck: Normal range of motion  Neck supple  Pulmonary/Chest: Effort normal  No respiratory distress  Abdominal: Soft  He exhibits no distension  Musculoskeletal: Normal range of motion  He exhibits no edema or tenderness  Neurological: He is alert and oriented to person, place, and time  No cranial nerve deficit  Skin: Skin is warm and dry  No rash noted  No erythema  No pallor  Psychiatric: His speech is normal  Judgment and thought content normal  His affect is angry and blunt  He is aggressive  Cognition and memory are normal    Nursing note and vitals reviewed          01/08/2019 1040                                      [facility]                                                             Pathologist:           [id number], [place]                                                         Specimens:   A) - Prostate, L Lat Base                                                                            B) - Prostate, L Base                                                                                C) - Prostate, L Lat Mid                                                                             D) - Prostate, L Mid                                                                                 E) - Prostate, L Lat [place]                                                                            F) - Prostate, L [place]                                                                                G) - Prostate, R Base                                                                                H) - Prostate, R Lat Base                                                                            I) - Prostate, R Mid                                                                                 J) - Prostate, R Lat Mid                                                                             K) - Prostate, R [place]                                                                              L) - Prostate, R Lat [place]                                                                  Final Diagnosis   A  Prostate, L Lat Base:  - Benign prostatic tissue       B  Prostate, L Base:  - Benign prostatic tissue        C  Prostate, L Lat Mid:  - Focal high grade prostatic intraepithelial neoplasia  - Prostatic multiplex stain supports the above interpretation       D  Prostate, L Mid:  - Benign prostatic tissue        E  Prostate, L Lat [place]:  - Focal high grade prostatic intraepithelial neoplasia  - Prostatic multiplex stain supports the above interpretation       F  Prostate, L [place]:  - Benign prostatic tissue with acute and chronic inflammation and focal squamous metaplasia        G  Prostate, R Base:  - Benign prostatic tissue        H  Prostate, R Lat Base:  - Benign prostatic tissue   - Prostatic multiplex stain supports the above interpretation       I  Prostate, R Mid:  - Benign prostatic tissue       J  Prostate, R Lat Mid:  - Benign prostatic tissue        K  Prostate, R [place]:  - Benign prostatic tissue        L  Prostate, R Lat [place]:  - Benign prostatic tissue with mild acute and chronic inflammation and atrophic changes

## 2019-01-31 ENCOUNTER — TELEPHONE (OUTPATIENT)
Dept: UROLOGY | Facility: AMBULATORY SURGERY CENTER | Age: 74
End: 2019-01-31

## 2019-01-31 NOTE — TELEPHONE ENCOUNTER
Spoke to pt, has questions re; future treatment  Physician he saw couldn't answer his questions  Will direct to Dr Apple Nguyen  Pt was informed his bx results were neg prior to appt  Questioned need for appt  I explained that Dr Apple Nguyen still likes to see his pt's to make sure there are no problems post biopsy  He like's to notify them prior of neg results to alleviate anxiety prior to appt

## 2019-02-01 NOTE — TELEPHONE ENCOUNTER
I spoke to the patient for my home yesterday so I did not have his epic records immediately available  We discussed further management of his elevated PSA and BPH going forward  I told him that at this point he he does not need another digital rectal exam, PSA or office visit for 1 year  In the future, elevated PSAs will be compared to the current level and this will become his new baseline  Future PSAs will be compared to the current number and not to the statistical normal of 4 0 or last   The patient was satisfied with this discussion  The patient has relocated to [place], [place]  The large Hospital in that area is in [place]  The patient question whether this was a good time to transfer his care to a urologist closer to home and I recommended that he do this  At this point, his prostate workup is complete and this is a good time to transfer his care  All concerns regarding his prostate health have been laid to rest   He does not need to be re-evaluated for a year  We will send him copies of the current pathology report, office visit and previous PSAs  The patient and I  on good terms

## 2019-06-13 ENCOUNTER — TELEPHONE (OUTPATIENT)
Dept: SCHEDULING | Facility: CLINIC | Age: 74
End: 2019-06-13

## 2019-06-13 NOTE — TELEPHONE ENCOUNTER
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 Pt requesting NPV/ Appt w/ Dr. Mart for [facility]. He can be reached at .

## 2019-06-14 NOTE — TELEPHONE ENCOUNTER
Left voice mail message for patient to call our office back to offer Tuesday, 6/18 @ 2:00 here @ [facility] new patient appt w/Dr Mart.

## 2019-06-14 NOTE — TELEPHONE ENCOUNTER
Pt called back to speak with Betzaida MERINO to confirm appt offered (6/18/19 at 2:00PM with Dr. Mart.     Pt can be reached at [phone]

## 2019-06-14 NOTE — TELEPHONE ENCOUNTER
See message below Please advise what date/time to offer new patient appt w/Dr Mart. I'll inform patient that Dr Mart sees new patients @ [facility].    Thanks.

## 2019-06-18 ENCOUNTER — CONSULT (OUTPATIENT)
Dept: CARDIOTHORACIC SURGERY | Facility: CLINIC | Age: 74
End: 2019-06-18
Payer: COMMERCIAL

## 2019-06-18 VITALS — HEIGHT: 71 IN | BODY MASS INDEX: 26.6 KG/M2 | WEIGHT: 190 LBS

## 2019-06-18 DIAGNOSIS — D15.1 PAPILLARY FIBROELASTOMA: Primary | ICD-10-CM

## 2019-06-18 PROCEDURE — 99203 OFFICE O/P NEW LOW 30 MIN: CPT | Performed by: NURSE PRACTITIONER

## 2019-06-18 RX ORDER — MONTELUKAST SODIUM 10 MG/1
10 TABLET ORAL NIGHTLY
COMMUNITY
Start: 2019-06-10 | End: 2020-02-13 | Stop reason: SDUPTHER

## 2019-06-18 RX ORDER — FLUTICASONE PROPIONATE AND SALMETEROL 100; 50 UG/1; UG/1
1 POWDER RESPIRATORY (INHALATION)
COMMUNITY
End: 2019-11-13 | Stop reason: DRUGHIGH

## 2019-06-18 RX ORDER — HYDROCHLOROTHIAZIDE 25 MG/1
25 TABLET ORAL EVERY MORNING
COMMUNITY
Start: 2019-05-22 | End: 2019-07-15 | Stop reason: HOSPADM

## 2019-06-18 RX ORDER — ROSUVASTATIN CALCIUM 10 MG/1
10 TABLET, COATED ORAL NIGHTLY
COMMUNITY
Start: 2019-06-10 | End: 2020-05-21 | Stop reason: SDUPTHER

## 2019-06-18 RX ORDER — VALSARTAN 80 MG/1
80 TABLET ORAL EVERY MORNING
COMMUNITY
Start: 2019-04-23 | End: 2019-07-15 | Stop reason: HOSPADM

## 2019-06-18 RX ORDER — FLUTICASONE PROPIONATE 50 MCG
1 SPRAY, SUSPENSION (ML) NASAL NIGHTLY
COMMUNITY
End: 2020-10-15 | Stop reason: SDUPTHER

## 2019-06-18 NOTE — LETTER
June 19, 2019     José Frank MD  [address]  [facility] [address]  [facility] [address]    Patient: Saad Hillman  YOB: 1945  Date of Visit: 6/18/2019      Dear Dr. Frank:    Thank you for referring Saad Hillman to me for evaluation. Below are my notes for this consultation.    If you have questions, please do not hesitate to call me. I look forward to following your patient along with you.         Sincerely,        José Mart MD        CC: MD Asia Freeman Jacqueline, CRNP  6/19/2019 10:59 AM  Signed  Subjective       Saad Hillman is a 74 y.o. 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  This was seen on a recent, routine echocardiogram.  He denies chest pain, shortness of breath, palpitations, and syncope.  He does have some mild lower extremity edema.  He has a past medical history of asthma, BPH, hypertension, hyperlipidemia, and osteoarthritis.    1945  Past Medical History:   Diagnosis Date   • Arthritis    • Asthma    • Hypertension      Social History     Social History   • Marital status:      Spouse name: N/A   • Number of children: N/A   • Years of education: N/A     Occupational History   • Not on file.     Social History Main Topics   • Smoking status: Never Smoker   • Smokeless tobacco: Never Used   • Alcohol use Yes   • Drug use: No   • Sexual activity: Not on file     Other Topics Concern   • Not on file     Social History Narrative   • No narrative on file     Current Outpatient Prescriptions   Medication Sig Dispense Refill   • fluticasone propion-salmeterol (ADVAIR DISKUS) 100-50 mcg/dose diskus inhaler Inhale 1 puff.     • fluticasone propionate (FLONASE) 50 mcg/actuation nasal spray Administer 1 spray into affected nostril(s).     • hydrochlorothiazide (HYDRODIURIL) 25 mg tablet Take 25 mg by mouth daily.     • montelukast (SINGULAIR)  10 mg tablet      • rosuvastatin (CRESTOR) 10 mg tablet Take 10 mg by mouth daily.     • valsartan (DIOVAN) 80 mg tablet Take 80 mg by mouth daily.       No current facility-administered medications for this visit.          The following have been reviewed and updated as appropriate in this visit:  Meds  Problems       Review of Systems   Constitutional: Negative for activity change, appetite change, fatigue and fever.   HENT: Negative.    Eyes: Negative.    Respiratory: Negative for cough, chest tightness, shortness of breath and wheezing.    Cardiovascular: Negative for chest pain, palpitations and leg swelling.   Gastrointestinal: Negative for abdominal pain, constipation, diarrhea and nausea.   Endocrine: Negative.    Genitourinary: Negative for difficulty urinating, frequency and urgency.   Musculoskeletal: Negative for arthralgias, back pain and joint swelling.   Skin: Negative for rash and wound.   Neurological: Negative for dizziness, tremors, weakness and headaches.   Hematological: Negative for adenopathy. Does not bruise/bleed easily.   Psychiatric/Behavioral: Negative.        Objective   Vitals:     Physical Exam   Constitutional: He is oriented to person, place, and time. He appears well-developed and well-nourished. No distress.   HENT:   Head: Normocephalic and atraumatic.   Cardiovascular: Normal rate, regular rhythm, normal heart sounds and normal pulses.  Exam reveals no gallop and no friction rub.    No murmur heard.  Pulmonary/Chest: Effort normal and breath sounds normal. No respiratory distress.   Abdominal: Soft. He exhibits no distension. There is no tenderness.   Musculoskeletal: Normal range of motion.   Neurological: He is alert and oriented to person, place, and time.   Skin: Skin is warm and dry. No rash noted.   Psychiatric: He has a normal mood and affect. His behavior is normal.   Vitals reviewed.      Assessment/Plan :    Saad Hillman is a 74 y.o. 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    We recommend a minimally invasive fibroelastoma excision via a right thoracotomy incision.  The risks and benefits of the procedure were discussed at length with the patient and his wife.  They would like some time to consider this and will call us if they decide to pursue surgery.  We advised that without surgery there is a probability that a piece of this can break off and cause a stroke.  The patient was advised that should he decide to pursue surgery he will need preoperative testing including a cardiac catheterization, carotid ultrasound, dental clearance, CT of the chest without contrast, and preadmission testing prior to surgery.  He was advised to go to the closest emergency room should he develop shortness of breath or chest pain.  He was advised to contact us with his decision.  We encouraged him to call in the meantime with any questions or concerns.    I, STEPHANIE Chen, am scribing for and in the presence of Dr. Mart.    Addendum: The patient called back and has decided to pursue surgery.  He has chosen a surgery date of 7/9/2019.  We will start his preop testing and see him in the office to review his studies.

## 2019-06-19 PROBLEM — D15.1 PAPILLARY FIBROELASTOMA: Status: ACTIVE | Noted: 2019-06-19

## 2019-06-19 ASSESSMENT — ENCOUNTER SYMPTOMS
ARTHRALGIAS: 0
FREQUENCY: 0
COUGH: 0
SHORTNESS OF BREATH: 0
WHEEZING: 0
ABDOMINAL PAIN: 0
PALPITATIONS: 0
CHEST TIGHTNESS: 0
EYES NEGATIVE: 1
BACK PAIN: 0
WOUND: 0
ENDOCRINE NEGATIVE: 1
HEADACHES: 0
WEAKNESS: 0
CONSTIPATION: 0
DIZZINESS: 0
JOINT SWELLING: 0
ACTIVITY CHANGE: 0
PSYCHIATRIC NEGATIVE: 1
BRUISES/BLEEDS EASILY: 0
DIARRHEA: 0
NAUSEA: 0
DIFFICULTY URINATING: 0
TREMORS: 0
ADENOPATHY: 0
APPETITE CHANGE: 0
FEVER: 0
FATIGUE: 0

## 2019-06-19 NOTE — TELEPHONE ENCOUNTER
I called and left a voicemail for the patient to set up his pre-op testing.  Provided my phone number for call back.

## 2019-06-19 NOTE — PROGRESS NOTES
Subjective       Saad Hillman is a 74 y.o. 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  This was seen on a recent, routine echocardiogram.  He denies chest pain, shortness of breath, palpitations, and syncope.  He does have some mild lower extremity edema.  He has a past medical history of asthma, BPH, hypertension, hyperlipidemia, and osteoarthritis.    1945  Past Medical History:   Diagnosis Date   • Arthritis    • Asthma    • Hypertension      Social History     Social History   • Marital status:      Spouse name: N/A   • Number of children: N/A   • Years of education: N/A     Occupational History   • Not on file.     Social History Main Topics   • Smoking status: Never Smoker   • Smokeless tobacco: Never Used   • Alcohol use Yes   • Drug use: No   • Sexual activity: Not on file     Other Topics Concern   • Not on file     Social History Narrative   • No narrative on file     Current Outpatient Prescriptions   Medication Sig Dispense Refill   • fluticasone propion-salmeterol (ADVAIR DISKUS) 100-50 mcg/dose diskus inhaler Inhale 1 puff.     • fluticasone propionate (FLONASE) 50 mcg/actuation nasal spray Administer 1 spray into affected nostril(s).     • hydrochlorothiazide (HYDRODIURIL) 25 mg tablet Take 25 mg by mouth daily.     • montelukast (SINGULAIR) 10 mg tablet      • rosuvastatin (CRESTOR) 10 mg tablet Take 10 mg by mouth daily.     • valsartan (DIOVAN) 80 mg tablet Take 80 mg by mouth daily.       No current facility-administered medications for this visit.          The following have been reviewed and updated as appropriate in this visit:  Meds  Problems       Review of Systems   Constitutional: Negative for activity change, appetite change, fatigue and fever.   HENT: Negative.    Eyes: Negative.    Respiratory: Negative for cough, chest tightness, shortness of breath and  wheezing.    Cardiovascular: Negative for chest pain, palpitations and leg swelling.   Gastrointestinal: Negative for abdominal pain, constipation, diarrhea and nausea.   Endocrine: Negative.    Genitourinary: Negative for difficulty urinating, frequency and urgency.   Musculoskeletal: Negative for arthralgias, back pain and joint swelling.   Skin: Negative for rash and wound.   Neurological: Negative for dizziness, tremors, weakness and headaches.   Hematological: Negative for adenopathy. Does not bruise/bleed easily.   Psychiatric/Behavioral: Negative.        Objective   Vitals:     Physical Exam   Constitutional: He is oriented to person, place, and time. He appears well-developed and well-nourished. No distress.   HENT:   Head: Normocephalic and atraumatic.   Cardiovascular: Normal rate, regular rhythm, normal heart sounds and normal pulses.  Exam reveals no gallop and no friction rub.    No murmur heard.  Pulmonary/Chest: Effort normal and breath sounds normal. No respiratory distress.   Abdominal: Soft. He exhibits no distension. There is no tenderness.   Musculoskeletal: Normal range of motion.   Neurological: He is alert and oriented to person, place, and time.   Skin: Skin is warm and dry. No rash noted.   Psychiatric: He has a normal mood and affect. His behavior is normal.   Vitals reviewed.      Assessment/Plan :    Saad Hillman is a 74 y.o. 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    We recommend a minimally invasive fibroelastoma excision via a right thoracotomy incision.  The risks and benefits of the procedure were discussed at length with the patient and his wife.   They would like some time to consider this and will call us if they decide to pursue surgery.  We advised that without surgery there is a probability that a piece of this can break off and cause a stroke.  The patient was advised that should he decide to pursue surgery he will need preoperative testing including a cardiac catheterization, carotid ultrasound, dental clearance, CT of the chest without contrast, and preadmission testing prior to surgery.  He was advised to go to the closest emergency room should he develop shortness of breath or chest pain.  He was advised to contact us with his decision.  We encouraged him to call in the meantime with any questions or concerns.    I, STEPHANIE Chen, am scribing for and in the presence of Dr. Mart.  I, Dr. José Mart performed the services as scribed by Zahraa BROWN in my presence.   Addendum: The patient called back and has decided to pursue surgery.  He has chosen a surgery date of 7/9/2019.  We will start his preop testing and see him in the office to review his studies.

## 2019-06-19 NOTE — TELEPHONE ENCOUNTER
Pt call for Radha Mancera getting set for testings prior to surgery that was discussed at last OV.   Please call  pt P# [phone]

## 2019-06-20 DIAGNOSIS — D15.1 PAPILLARY FIBROELASTOMA: Primary | ICD-10-CM

## 2019-06-24 ENCOUNTER — TELEPHONE (OUTPATIENT)
Dept: CARDIOTHORACIC SURGERY | Facility: CLINIC | Age: 74
End: 2019-06-24

## 2019-06-25 LAB
ALBUMIN SERPL-MCNC: 4.1 G/DL (ref 3.6–5.1)
ALBUMIN/GLOB SERPL: 1.8 (CALC) (ref 1–2.5)
ALP SERPL-CCNC: 90 U/L (ref 40–115)
ALT SERPL-CCNC: 21 U/L (ref 9–46)
AST SERPL-CCNC: 19 U/L (ref 10–35)
BASOPHILS # BLD AUTO: 37 CELLS/UL (ref 0–200)
BASOPHILS NFR BLD AUTO: 0.6 %
BILIRUB SERPL-MCNC: 0.7 MG/DL (ref 0.2–1.2)
BUN SERPL-MCNC: 11 MG/DL (ref 7–25)
BUN/CREAT SERPL: ABNORMAL (CALC) (ref 6–22)
CALCIUM SERPL-MCNC: 9.9 MG/DL (ref 8.6–10.3)
CHLORIDE SERPL-SCNC: 100 MMOL/L (ref 98–110)
CO2 SERPL-SCNC: 25 MMOL/L (ref 20–32)
CREAT SERPL-MCNC: 0.92 MG/DL (ref 0.7–1.18)
EOSINOPHIL # BLD AUTO: 167 CELLS/UL (ref 15–500)
EOSINOPHIL NFR BLD AUTO: 2.7 %
ERYTHROCYTE [DISTWIDTH] IN BLOOD BY AUTOMATED COUNT: 12.2 % (ref 11–15)
GLOBULIN SER CALC-MCNC: 2.3 G/DL (CALC) (ref 1.9–3.7)
GLUCOSE SERPL-MCNC: 118 MG/DL (ref 65–99)
HCT VFR BLD AUTO: 47.5 % (ref 38.5–50)
HGB BLD-MCNC: 16.1 G/DL (ref 13.2–17.1)
LYMPHOCYTES # BLD AUTO: 1339 CELLS/UL (ref 850–3900)
LYMPHOCYTES NFR BLD AUTO: 21.6 %
MCH RBC QN AUTO: 30.3 PG (ref 27–33)
MCHC RBC AUTO-ENTMCNC: 33.9 G/DL (ref 32–36)
MCV RBC AUTO: 89.3 FL (ref 80–100)
MONOCYTES # BLD AUTO: 490 CELLS/UL (ref 200–950)
MONOCYTES NFR BLD AUTO: 7.9 %
NEUTROPHILS # BLD AUTO: 4166 CELLS/UL (ref 1500–7800)
NEUTROPHILS NFR BLD AUTO: 67.2 %
PLATELET # BLD AUTO: 312 THOUSAND/UL (ref 140–400)
PMV BLD REES-ECKER: 9.9 FL (ref 7.5–12.5)
POTASSIUM SERPL-SCNC: 4.1 MMOL/L (ref 3.5–5.3)
PROT SERPL-MCNC: 6.4 G/DL (ref 6.1–8.1)
QUEST EGFR NON-AFR. AMERICAN: 81 ML/MIN/1.73M2
RBC # BLD AUTO: 5.32 MILLION/UL (ref 4.2–5.8)
SODIUM SERPL-SCNC: 136 MMOL/L (ref 135–146)
WBC # BLD AUTO: 6.2 THOUSAND/UL (ref 3.8–10.8)

## 2019-06-26 ENCOUNTER — HOSPITAL ENCOUNTER (OUTPATIENT)
Facility: HOSPITAL | Age: 74
Setting detail: SURGERY ADMIT
Discharge: HOME | End: 2019-06-26
Attending: INTERNAL MEDICINE | Admitting: INTERNAL MEDICINE
Payer: COMMERCIAL

## 2019-06-26 VITALS
HEART RATE: 64 BPM | TEMPERATURE: 98 F | OXYGEN SATURATION: 95 % | SYSTOLIC BLOOD PRESSURE: 125 MMHG | BODY MASS INDEX: 25.9 KG/M2 | DIASTOLIC BLOOD PRESSURE: 68 MMHG | HEIGHT: 71 IN | RESPIRATION RATE: 25 BRPM | WEIGHT: 185 LBS

## 2019-06-26 DIAGNOSIS — D15.1 PAPILLARY FIBROELASTOMA: ICD-10-CM

## 2019-06-26 LAB
ATRIAL RATE: 71
P AXIS: 47
PR INTERVAL: 170
QRS DURATION: 112
QT INTERVAL: 398
QTC CALCULATION(BAZETT): 432
R AXIS: 54
T WAVE AXIS: 51
VENTRICULAR RATE: 71

## 2019-06-26 PROCEDURE — C1769 GUIDE WIRE: HCPCS | Performed by: INTERNAL MEDICINE

## 2019-06-26 PROCEDURE — 93010 ELECTROCARDIOGRAM REPORT: CPT | Performed by: INTERNAL MEDICINE

## 2019-06-26 PROCEDURE — 93454 CORONARY ARTERY ANGIO S&I: CPT | Performed by: INTERNAL MEDICINE

## 2019-06-26 PROCEDURE — 93005 ELECTROCARDIOGRAM TRACING: CPT | Mod: 59 | Performed by: INTERNAL MEDICINE

## 2019-06-26 PROCEDURE — 99152 MOD SED SAME PHYS/QHP 5/>YRS: CPT | Performed by: INTERNAL MEDICINE

## 2019-06-26 PROCEDURE — 27200000 HC STERILE SUPPLY: Performed by: INTERNAL MEDICINE

## 2019-06-26 PROCEDURE — C1894 INTRO/SHEATH, NON-LASER: HCPCS | Performed by: INTERNAL MEDICINE

## 2019-06-26 PROCEDURE — 25000000 HC PHARMACY GENERAL: Performed by: INTERNAL MEDICINE

## 2019-06-26 PROCEDURE — 71000011 HC PACU PHASE 1 EA ADDL MIN: Performed by: INTERNAL MEDICINE

## 2019-06-26 PROCEDURE — 71000001 HC PACU PHASE 1 INITIAL 30MIN: Performed by: INTERNAL MEDICINE

## 2019-06-26 PROCEDURE — 63600105 HC IODINE BASED CONTRAST: Mod: JW | Performed by: INTERNAL MEDICINE

## 2019-06-26 PROCEDURE — B2111ZZ FLUOROSCOPY OF MULTIPLE CORONARY ARTERIES USING LOW OSMOLAR CONTRAST: ICD-10-PCS | Performed by: INTERNAL MEDICINE

## 2019-06-26 PROCEDURE — 93454 CORONARY ARTERY ANGIO S&I: CPT | Mod: 26 | Performed by: INTERNAL MEDICINE

## 2019-06-26 PROCEDURE — 63600000 HC DRUGS/DETAIL CODE: Mod: JW | Performed by: INTERNAL MEDICINE

## 2019-06-26 RX ORDER — HEPARIN SODIUM 1000 [USP'U]/ML
INJECTION, SOLUTION INTRAVENOUS; SUBCUTANEOUS AS NEEDED
Status: DISCONTINUED | OUTPATIENT
Start: 2019-06-26 | End: 2019-06-26 | Stop reason: HOSPADM

## 2019-06-26 RX ORDER — IODIXANOL 320 MG/ML
INJECTION, SOLUTION INTRAVASCULAR AS NEEDED
Status: DISCONTINUED | OUTPATIENT
Start: 2019-06-26 | End: 2019-06-26 | Stop reason: HOSPADM

## 2019-06-26 RX ORDER — LIDOCAINE HYDROCHLORIDE 10 MG/ML
INJECTION, SOLUTION INFILTRATION; PERINEURAL AS NEEDED
Status: DISCONTINUED | OUTPATIENT
Start: 2019-06-26 | End: 2019-06-26 | Stop reason: HOSPADM

## 2019-06-26 RX ORDER — MIDAZOLAM HYDROCHLORIDE 2 MG/2ML
INJECTION, SOLUTION INTRAMUSCULAR; INTRAVENOUS AS NEEDED
Status: DISCONTINUED | OUTPATIENT
Start: 2019-06-26 | End: 2019-06-26 | Stop reason: HOSPADM

## 2019-06-26 RX ORDER — NICARDIPINE HCL-0.9% SOD CHLOR 1 MG/10 ML
SYRINGE (ML) INTRAVENOUS AS NEEDED
Status: DISCONTINUED | OUTPATIENT
Start: 2019-06-26 | End: 2019-06-26 | Stop reason: HOSPADM

## 2019-06-26 RX ORDER — FENTANYL CITRATE 50 UG/ML
INJECTION, SOLUTION INTRAMUSCULAR; INTRAVENOUS AS NEEDED
Status: DISCONTINUED | OUTPATIENT
Start: 2019-06-26 | End: 2019-06-26 | Stop reason: HOSPADM

## 2019-06-26 NOTE — PRE-PROCEDURE NOTE
Cardiac Cath Lab Pre-procedure Note    - Patient was seen and examined at bedside.  - The patient's chart and all data was reviewed.  - The procedure, treatment alternatives, risks and benefits were explained with specific risks discussed.  - Patient was consented for cardiac cath procedure and possible PCI.  - Patient's case was found appropriate for dual antiplatelet therapy.    Patient's clinical presentation to the cardiac cath lab: pre-TAVR.       Patient appears to be well.         Total anti-anginal medications: none.         Pre-sedation assessment  ASA 2   Mallampati class: II - soft palate, uvula, fauces visible.

## 2019-06-26 NOTE — Clinical Note
The right groin and right radial was clipped, marked  and prepped with ChloraPrep. The patient was draped in a sterile fashion after allowing for the recommended dry time.

## 2019-06-26 NOTE — DISCHARGE INSTRUCTIONS
· Post cardiac catheterization instructions:  · NO driving or operating heavy machinery for 24 hours.  This is because of sedation you received for your procedure.  · On day of discharge, limit activities.  · No heavy lifting greater than 10 lbs for the next 2 days after procedure.    · Remove dressing next day. Keep site clean and dry.  · May shower next day of procedure. Do not submerge catherization site in pool or tub baths for 5 days  · Call if drainage, swelling, bleeding, fever or drainage from catheterization site          Medications:  Please take medications as directed. Any questions or concerns, please contact your physician's  office.     Follow up: Dr. Mart   [phone]

## 2019-06-26 NOTE — H&P (VIEW-ONLY)
Subjective       Saad Hillman is a 74 y.o. 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  This was seen on a recent, routine echocardiogram.  He denies chest pain, shortness of breath, palpitations, and syncope.  He does have some mild lower extremity edema.  He has a past medical history of asthma, BPH, hypertension, hyperlipidemia, and osteoarthritis.    1945  Past Medical History:   Diagnosis Date   • Arthritis    • Asthma    • Hypertension      Social History     Social History   • Marital status:      Spouse name: N/A   • Number of children: N/A   • Years of education: N/A     Occupational History   • Not on file.     Social History Main Topics   • Smoking status: Never Smoker   • Smokeless tobacco: Never Used   • Alcohol use Yes   • Drug use: No   • Sexual activity: Not on file     Other Topics Concern   • Not on file     Social History Narrative   • No narrative on file     Current Outpatient Prescriptions   Medication Sig Dispense Refill   • fluticasone propion-salmeterol (ADVAIR DISKUS) 100-50 mcg/dose diskus inhaler Inhale 1 puff.     • fluticasone propionate (FLONASE) 50 mcg/actuation nasal spray Administer 1 spray into affected nostril(s).     • hydrochlorothiazide (HYDRODIURIL) 25 mg tablet Take 25 mg by mouth daily.     • montelukast (SINGULAIR) 10 mg tablet      • rosuvastatin (CRESTOR) 10 mg tablet Take 10 mg by mouth daily.     • valsartan (DIOVAN) 80 mg tablet Take 80 mg by mouth daily.       No current facility-administered medications for this visit.          The following have been reviewed and updated as appropriate in this visit:  Meds  Problems       Review of Systems   Constitutional: Negative for activity change, appetite change, fatigue and fever.   HENT: Negative.    Eyes: Negative.    Respiratory: Negative for cough, chest tightness, shortness of breath and  wheezing.    Cardiovascular: Negative for chest pain, palpitations and leg swelling.   Gastrointestinal: Negative for abdominal pain, constipation, diarrhea and nausea.   Endocrine: Negative.    Genitourinary: Negative for difficulty urinating, frequency and urgency.   Musculoskeletal: Negative for arthralgias, back pain and joint swelling.   Skin: Negative for rash and wound.   Neurological: Negative for dizziness, tremors, weakness and headaches.   Hematological: Negative for adenopathy. Does not bruise/bleed easily.   Psychiatric/Behavioral: Negative.        Objective   Vitals:     Physical Exam   Constitutional: He is oriented to person, place, and time. He appears well-developed and well-nourished. No distress.   HENT:   Head: Normocephalic and atraumatic.   Cardiovascular: Normal rate, regular rhythm, normal heart sounds and normal pulses.  Exam reveals no gallop and no friction rub.    No murmur heard.  Pulmonary/Chest: Effort normal and breath sounds normal. No respiratory distress.   Abdominal: Soft. He exhibits no distension. There is no tenderness.   Musculoskeletal: Normal range of motion.   Neurological: He is alert and oriented to person, place, and time.   Skin: Skin is warm and dry. No rash noted.   Psychiatric: He has a normal mood and affect. His behavior is normal.   Vitals reviewed.      Assessment/Plan :    Saad Hillman is a 74 y.o. 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    We recommend a minimally invasive fibroelastoma excision via a right thoracotomy incision.  The risks and benefits of the procedure were discussed at length with the patient and his wife.   They would like some time to consider this and will call us if they decide to pursue surgery.  We advised that without surgery there is a probability that a piece of this can break off and cause a stroke.  The patient was advised that should he decide to pursue surgery he will need preoperative testing including a cardiac catheterization, carotid ultrasound, dental clearance, CT of the chest without contrast, and preadmission testing prior to surgery.  He was advised to go to the closest emergency room should he develop shortness of breath or chest pain.  He was advised to contact us with his decision.  We encouraged him to call in the meantime with any questions or concerns.    I, STEPHANIE Chen, am scribing for and in the presence of Dr. Mart.    Addendum: The patient called back and has decided to pursue surgery.  He has chosen a surgery date of 7/9/2019.  We will start his preop testing and see him in the office to review his studies.

## 2019-06-28 ENCOUNTER — HOSPITAL ENCOUNTER (OUTPATIENT)
Dept: PULMONOLOGY | Facility: HOSPITAL | Age: 74
Discharge: HOME | End: 2019-06-28
Attending: NURSE PRACTITIONER
Payer: COMMERCIAL

## 2019-06-28 ENCOUNTER — HOSPITAL ENCOUNTER (OUTPATIENT)
Dept: CARDIOLOGY | Facility: HOSPITAL | Age: 74
Discharge: HOME | End: 2019-06-28
Attending: PHYSICIAN ASSISTANT
Payer: COMMERCIAL

## 2019-06-28 DIAGNOSIS — D15.1 PAPILLARY FIBROELASTOMA OF HEART: Primary | ICD-10-CM

## 2019-06-28 DIAGNOSIS — D15.1 PAPILLARY FIBROELASTOMA: ICD-10-CM

## 2019-06-28 LAB
LEFT CCA DIST DIAS: 17.66 CM/S
LEFT CCA DIST SYS: 147.76 CM/S
LEFT CCA MID DIAS: 17.66 CM/S
LEFT CCA MID SYS: 136.15 CM/S
LEFT CCA PROX DIAS: 13.01 CM/S
LEFT CCA PROX SYS: 145.44 CM/S
LEFT ECA DIAS: 8.36 CM/S
LEFT ECA SYS: 140.79 CM/S
LEFT ICA DIST DIAS: 23.58 CM/S
LEFT ICA DIST SYS: 97.88 CM/S
LEFT ICA MID DIAS: 18.74 CM/S
LEFT ICA MID SYS: 101.11 CM/S
LEFT ICA PROX DIAS: 13.89 CM/S
LEFT ICA PROX SYS: 97.88 CM/S
LEFT ICA/CCA SYS: 0.68
LEFT ICA/CCA SYS: 1.06
LEFT SUBCLAVIAN DIAS: 0 CM/S
LEFT SUBCLAVIAN SYS: 97.35 CM/S
LEFT VERTEBRAL DIAS: 18.74 CM/S
LEFT VERTEBRAL SYS: 73.65 CM/S
LT ECA PROX EDV: 8.36 CM/S
LT ECA PROX PSV: 140.79 CM/S
LT SUBCLAVIAN PROX PSV: 97.35 CM/S
LT VERTEBRAL PROX EDV: 18.74 CM/S
LT VERTEBRAL PROX PSV: 73.65 CM/S
RIGHT CCA DIST DIAS: 23.58 CM/S
RIGHT CCA DIST SYS: 110.8 CM/S
RIGHT CCA MID DIAS: 26.81 CM/S
RIGHT CCA MID SYS: 126.96 CM/S
RIGHT CCA PROX DIAS: 18.74 CM/S
RIGHT CCA PROX SYS: 136.65 CM/S
RIGHT ECA DIAS: 13.89 CM/S
RIGHT ECA SYS: 112.42 CM/S
RIGHT ICA DIST DIAS: 30.04 CM/S
RIGHT ICA DIST SYS: 112.42 CM/S
RIGHT ICA MID DIAS: 28.43 CM/S
RIGHT ICA MID SYS: 109.19 CM/S
RIGHT ICA PROX DIAS: 15.51 CM/S
RIGHT ICA PROX SYS: 59.12 CM/S
RIGHT ICA/CCA SYS: 1.01
RIGHT ICA/CCA SYS: 1.27
RIGHT SUBCLAVIAN DIAS: 0 CM/S
RIGHT SUBCLAVIAN SYS: 143.12 CM/S
RIGHT VERTEBRAL DIAS: 18.74 CM/S
RIGHT VERTEBRAL SYS: 80.12 CM/S
RT ECA PROC EDV: 13.89 CM/S
RT SUBCLAVIAN PROX PSV: 143.12 CM/S
RT VERTEBRAL PROX EDV: 18.74 CM/S

## 2019-06-28 PROCEDURE — 25000000 HC PHARMACY GENERAL: Performed by: NURSE PRACTITIONER

## 2019-06-28 PROCEDURE — 94729 DIFFUSING CAPACITY: CPT

## 2019-06-28 PROCEDURE — 94060 EVALUATION OF WHEEZING: CPT

## 2019-06-28 PROCEDURE — 93880 EXTRACRANIAL BILAT STUDY: CPT

## 2019-06-28 RX ORDER — ALBUTEROL SULFATE 0.83 MG/ML
2.5 SOLUTION RESPIRATORY (INHALATION) ONCE
Status: COMPLETED | OUTPATIENT
Start: 2019-06-28 | End: 2019-06-28

## 2019-06-28 RX ADMIN — ALBUTEROL SULFATE 2.5 MG: 2.5 SOLUTION RESPIRATORY (INHALATION) at 09:12

## 2019-07-02 ENCOUNTER — TELEPHONE (OUTPATIENT)
Dept: CARDIOTHORACIC SURGERY | Facility: HOSPITAL | Age: 74
End: 2019-07-02

## 2019-07-02 ENCOUNTER — TELEPHONE (OUTPATIENT)
Dept: CARDIOTHORACIC SURGERY | Facility: CLINIC | Age: 74
End: 2019-07-02

## 2019-07-02 ENCOUNTER — ANESTHESIA EVENT (INPATIENT)
Dept: OPERATING ROOM | Facility: HOSPITAL | Age: 74
DRG: 229 | End: 2019-07-02
Payer: COMMERCIAL

## 2019-07-02 ENCOUNTER — HOSPITAL ENCOUNTER (OUTPATIENT)
Dept: CARDIOLOGY | Facility: HOSPITAL | Age: 74
Discharge: HOME | End: 2019-07-02
Attending: NURSE PRACTITIONER
Payer: COMMERCIAL

## 2019-07-02 ENCOUNTER — APPOINTMENT (OUTPATIENT)
Dept: PREADMISSION TESTING | Facility: HOSPITAL | Age: 74
End: 2019-07-02
Attending: THORACIC SURGERY (CARDIOTHORACIC VASCULAR SURGERY)
Payer: COMMERCIAL

## 2019-07-02 ENCOUNTER — APPOINTMENT (OUTPATIENT)
Dept: LAB | Facility: HOSPITAL | Age: 74
End: 2019-07-02
Attending: NURSE PRACTITIONER
Payer: COMMERCIAL

## 2019-07-02 VITALS
OXYGEN SATURATION: 96 % | BODY MASS INDEX: 27.55 KG/M2 | HEART RATE: 70 BPM | TEMPERATURE: 97.1 F | DIASTOLIC BLOOD PRESSURE: 82 MMHG | HEIGHT: 69 IN | WEIGHT: 186 LBS | SYSTOLIC BLOOD PRESSURE: 141 MMHG | RESPIRATION RATE: 16 BRPM

## 2019-07-02 DIAGNOSIS — Z01.818 ENCOUNTER FOR PREADMISSION TESTING: Primary | ICD-10-CM

## 2019-07-02 DIAGNOSIS — D15.1 PAPILLARY FIBROELASTOMA: ICD-10-CM

## 2019-07-02 LAB
ABO + RH BLD: NORMAL
ATRIAL RATE: 68
BACTERIA URNS QL MICRO: NORMAL /HPF
BILIRUB UR QL STRIP.AUTO: NEGATIVE MG/DL
BLD GP AB SCN SERPL QL: NEGATIVE
BLOOD BANK CMNT PATIENT-IMP: NORMAL
CLARITY UR REFRACT.AUTO: CLEAR
COLOR UR AUTO: YELLOW
D AG BLD QL: POSITIVE
EST. AVERAGE GLUCOSE BLD GHB EST-MCNC: 108 MG/DL
GLUCOSE UR STRIP.AUTO-MCNC: NEGATIVE MG/DL
HBA1C MFR BLD HPLC: 5.4 %
HGB UR QL STRIP.AUTO: NEGATIVE
HYALINE CASTS #/AREA URNS LPF: NORMAL /LPF
INR PPP: 1 INR
KETONES UR STRIP.AUTO-MCNC: NEGATIVE MG/DL
LABORATORY COMMENT REPORT: NORMAL
LEUKOCYTE ESTERASE UR QL STRIP.AUTO: NEGATIVE
NITRITE UR QL STRIP.AUTO: NEGATIVE
P AXIS: 72
PH UR STRIP.AUTO: 7.5 [PH]
PR INTERVAL: 168
PREALB SERPL-MCNC: 27.4 MG/DL (ref 18–38)
PROT UR QL STRIP.AUTO: NEGATIVE
PROTHROMBIN TIME: 12.9 SEC (ref 12.2–14.5)
QRS DURATION: 110
QT INTERVAL: 392
QTC CALCULATION(BAZETT): 416
R AXIS: 86
RBC #/AREA URNS HPF: NORMAL /HPF
SP GR UR REFRACT.AUTO: 1.01
SQUAMOUS URNS QL MICRO: NORMAL /HPF
T WAVE AXIS: 77
TSH SERPL DL<=0.05 MIU/L-ACNC: 1.04 MIU/L (ref 0.34–5.6)
UROBILINOGEN UR STRIP-ACNC: 0.2 EU/DL
VENTRICULAR RATE: 68
WBC #/AREA URNS HPF: NORMAL /HPF

## 2019-07-02 PROCEDURE — 86920 COMPATIBILITY TEST SPIN: CPT

## 2019-07-02 PROCEDURE — 36415 COLL VENOUS BLD VENIPUNCTURE: CPT | Performed by: THORACIC SURGERY (CARDIOTHORACIC VASCULAR SURGERY)

## 2019-07-02 PROCEDURE — 84443 ASSAY THYROID STIM HORMONE: CPT

## 2019-07-02 PROCEDURE — 81003 URINALYSIS AUTO W/O SCOPE: CPT

## 2019-07-02 PROCEDURE — 93005 ELECTROCARDIOGRAM TRACING: CPT

## 2019-07-02 PROCEDURE — 93010 ELECTROCARDIOGRAM REPORT: CPT | Performed by: INTERNAL MEDICINE

## 2019-07-02 PROCEDURE — 84134 ASSAY OF PREALBUMIN: CPT

## 2019-07-02 PROCEDURE — 85610 PROTHROMBIN TIME: CPT | Performed by: NURSE PRACTITIONER

## 2019-07-02 PROCEDURE — 83036 HEMOGLOBIN GLYCOSYLATED A1C: CPT | Performed by: NURSE PRACTITIONER

## 2019-07-02 PROCEDURE — 86901 BLOOD TYPING SEROLOGIC RH(D): CPT

## 2019-07-02 RX ORDER — ACETAMINOPHEN 500 MG
500 TABLET ORAL EVERY 6 HOURS PRN
COMMUNITY
End: 2019-11-13 | Stop reason: ALTCHOICE

## 2019-07-02 ASSESSMENT — ENCOUNTER SYMPTOMS
PSYCHIATRIC NEGATIVE: 1
MUSCULOSKELETAL NEGATIVE: 1
EYES NEGATIVE: 1
ENDOCRINE NEGATIVE: 1
NEUROLOGICAL NEGATIVE: 1
CONSTITUTIONAL NEGATIVE: 1
GASTROINTESTINAL NEGATIVE: 1
HEMATOLOGIC/LYMPHATIC NEGATIVE: 1

## 2019-07-02 ASSESSMENT — PAIN SCALES - GENERAL: PAINLEVEL: 0-NO PAIN

## 2019-07-02 NOTE — ANESTHESIA PREPROCEDURE EVALUATION
Anesthesia ROS/MED HX    Anesthesia History    History of anesthetic complications  - PONV  Pulmonary    asthma  Neuro/Psych - neg  Cardiovascular   Valvular problems/murmurs   hypertension  GI/Hepatic   GERD    Control: well controlled  Musculoskeletal   Arthritis  Endo/Other  Body Habitus: Normal      Past Surgical History:   Procedure Laterality Date   • FINGER SURGERY      s/p traumatic amputation- had repaired    • HERNIA REPAIR  2012    left inguinal hernia repair    • LIPOMA RESECTION  1999    right shoulder x 2 and back of neck    • NASAL POLYP EXCISION  1966- 1999    x 4    • TONSILLECTOMY  1948   • TRANSURETHRAL RESECTION OF PROSTATE  2017   • VASECTOMY  1980's     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       Physical Exam    Airway   Mallampati: II   TM distance: >3 FB   Neck ROM: full  Cardiovascular - normal   Rhythm: regular   Rate: normal  Pulmonary - normal   clear to auscultation  Dental - normal        Anesthesia Plan    Plan: general    Technique: general endotracheal     Lines and Monitors: MAYE, arterial line and central line     Airway: oral intubation   ASA 3  Blood Products:     Use of Blood Products Discussed: Yes     Consented to blood products  Anesthetic plan and risks discussed with: patient  Postop Plan:   Patient Disposition: ICU planned admission   Pain Management: IV analgesics  Comments:    Plan: 74M for fibroelastoma removal  PMH:  PONV  Fibroelastoma  BPH  HTN  Asthma    PFTs: very mild obstructive defect with normal gas exchange  TTE: EF 60%, normal RV size and  function, 1.2x1.1cm fibroelastoma attached to subvalvular apparatus of AML, trace MR, RVSP <35  ECG: NSR, iRBBB  Cath: 30% pLAD, 80% dLAD    Plan:  GETA, MAYE, maikol, CVP

## 2019-07-02 NOTE — TELEPHONE ENCOUNTER
Left detailed message for Dr. Araujo, patient's dentist, to define what patient's status is. He had deep cleaning yesterday and patient says she told him he has dormant infection. I left my cell phone number since the doctor is off today. Would like her to call me tomorrow and send a letter letting us know if infection isn't active patient can proceed with surgery or will he need to be post poned.     Patient had deep cleaning without antibiotic prep yesterday, at [facility].

## 2019-07-02 NOTE — PRE-PROCEDURE INSTRUCTIONS
1. We will call you between 3 pm and 7 pm on Monday July 8, 2019 to determine that arrival time for your procedure. If you do not hear by 6PM. Please call [phone] for arrival time.    2. Please report to [address], walk into main [place] and report to the admission desk on first floor on the day of your procedure.   3. Please follow the following fasting guidelines:   Nothing to eat or drink after midnight unless otherwise instructed by  your physician.    4. Follow your surgeons instructions on medications.     5. Other Instructions: Follow skin cleansing schedule.     6. If you develop a cold, cough, fever, rash, or other symptom prior to the data of the procedure, please report it to your physician immediately.   7. If you need to cancel the procedure for any reason, please contact your physician or call the unit listed above.   8. Make arrangements to have someone drive you home from the procedure. If you have not arranged for transportation home, your surgery may be cancelled.    9. You may not take public transportation unless accompanied by a responsible person.   10. You may not drive a car or operate complex or potentially dangerous machinery for 24 hours following anesthesia and/or sedation.   11. If it is medically necessary for you to have a longer stay, you will be informed as soon as the decision is made.   12. Do not wear or being anything of value to the hospital including jewelry of any kind. Do not wear make-up or contact lenses. DO bring your glasses a case.    13. No lotion, creams, powders, or oils on skin the morning of procedure    14. Dress in comfortable clothes.   15.  If instructed, please bring a copy of your Advanced Directive (Living Will/Durable Power of ) on the day of your procedure.      Pre operative instructions given as per protocol.  Form explained by: STEPHANIE Mccollum     I have read and understand the above information. I have had sufficient  opportunity to ask questions I might have and they have been answered to my satisfaction. I agree to comply with the Patient Responsibilities listed above and have received a copy of this form.

## 2019-07-02 NOTE — H&P
History and Physical  Pre-admission testing         HISTORY OF PRESENT ILLNESS      Saad Hillman is an 74 y.o. male with a past medical history of edema which started in 2/2019.  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 Patient with edema.  Denies chest pain, palpitations, lightheadedness or dizziness.  Referred to Dr Mart for surgical evaluation who recommends ATRIUM MYXOMA EXCISION/ATRIAL MASS/ATRIAL APPENDAGE EXCISION  Scheduled for 7/9/19.      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       Past Surgical History:   Procedure Laterality Date   • FINGER SURGERY      s/p traumatic amputation- had repaired    • HERNIA REPAIR  2012    left inguinal hernia repair    • LIPOMA RESECTION  1999    right shoulder x 2 and back of neck    • NASAL POLYP EXCISION  1966- 1999    x 4    • TONSILLECTOMY  1948   • TRANSURETHRAL RESECTION OF PROSTATE  2017   • VASECTOMY  1980's       PROBLEM LIST     Patient Active Problem List   Diagnosis   • Papillary fibroelastoma   • Atrial myxoma   • Asthma   • Seasonal allergies   • BPH (benign prostatic hyperplasia)   • Traumatic amputation of finger   • Arthritis   • Medial meniscus tear       MEDICATIONS        Current Outpatient Prescriptions:   •  acetaminophen (TYLENOL) 500 mg tablet, Take 500 mg by mouth every 6 (six) hours as needed for mild pain., Disp: , Rfl:   •  albuterol HFA (VENTOLIN HFA) 90 mcg/actuation inhaler, Inhale 2 puffs every 6 (six) hours as needed., Disp: , Rfl:    •  fluticasone propion-salmeterol (ADVAIR DISKUS) 100-50 mcg/dose diskus inhaler, Inhale 1 puff 2 (two) times a day.  , Disp: , Rfl:   •  fluticasone propionate (FLONASE) 50 mcg/actuation nasal spray, Administer 1 spray into affected nostril(s) nightly.  , Disp: , Rfl:   •  hydrochlorothiazide (HYDRODIURIL) 25 mg tablet, Take 25 mg by mouth every morning.  , Disp: , Rfl:   •  montelukast (SINGULAIR) 10 mg tablet, Take 10 mg by mouth nightly.  , Disp: , Rfl:   •  mupirocin (BACTROBAN) 2 % nasal ointment, Use dime size amt in each nostril twice daily five (5) days prior to surgery., Disp: 22 g, Rfl: 0  •  rosuvastatin (CRESTOR) 10 mg tablet, Take 10 mg by mouth nightly.  , Disp: , Rfl:   •  valsartan (DIOVAN) 80 mg tablet, Take 80 mg by mouth every morning.  , Disp: , Rfl:     ALLERGIES      Allergies   Allergen Reactions   • Aspirin Other (see comments)     Asthma attack   • Hydrocodone-Acetaminophen GI intolerance     CONSTIPATION       FAMILY HISTORY      Family History   Problem Relation Age of Onset   • Abdominal Aortic Aneurysm Mother    • Hyperlipidemia Mother    • Stroke Father        SOCIAL HISTORY      Social History     Social History   • Marital status:      Spouse name: Umu    • Number of children: 2   • Years of education: N/A     Occupational History   • Not on file.     Social History Main Topics   • Smoking status: Never Smoker   • Smokeless tobacco: Never Used   • Alcohol use Yes      Comment: occasional    • Drug use: No   • Sexual activity: Not on file     Other Topics Concern   • Not on file     Social History Narrative   • No narrative on file       REVIEW OF SYSTEMS      Review of Systems   Constitutional: Negative.    HENT: Negative.    Eyes: Negative.    Cardiovascular: Positive for leg swelling.   Gastrointestinal: Negative.    Endocrine: Negative.    Genitourinary: Negative.    Musculoskeletal: Negative.    Allergic/Immunologic: Positive for environmental allergies.  "  Neurological: Negative.    Hematological: Negative.    Psychiatric/Behavioral: Negative.        PHYSICAL EXAMINATION      BP (!) 141/82 (BP Location: Right upper arm, Patient Position: Sitting)   Pulse 70   Temp 36.2 °C (97.1 °F) (Temporal)   Resp 16   Ht 1.753 m (5' 9\")   Wt 84.4 kg (186 lb)   SpO2 96%   BMI 27.47 kg/m²   Body mass index is 27.47 kg/m².    Physical Exam   Constitutional: He is oriented to person, place, and time. He appears well-developed.   HENT:   Head: Normocephalic.   Eyes: Pupils are equal, round, and reactive to light. Conjunctivae are normal.   Neck: Normal range of motion. Neck supple.   Cardiovascular: Normal rate, regular rhythm, normal heart sounds and intact distal pulses.    + 2 bilateral pedal ankle edema   Pulmonary/Chest: Effort normal and breath sounds normal.   Abdominal: Soft. Bowel sounds are normal.   Musculoskeletal: Normal range of motion.   Neurological: He is alert and oriented to person, place, and time.   Skin: Skin is warm and dry.   Psychiatric: He has a normal mood and affect. His behavior is normal. Thought content normal.   Vitals reviewed.         STEPHANIE Mccollum  7/2/2019     "

## 2019-07-03 ENCOUNTER — TELEPHONE (OUTPATIENT)
Dept: CARDIOTHORACIC SURGERY | Facility: HOSPITAL | Age: 74
End: 2019-07-03

## 2019-07-03 NOTE — TELEPHONE ENCOUNTER
Patient called regarding his Metamucil.  I was not sure what to instruct the patient regarding stopping this. I told the patient that one of you would call him on Friday.  Thank You

## 2019-07-05 ENCOUNTER — HOSPITAL ENCOUNTER (OUTPATIENT)
Dept: RADIOLOGY | Facility: HOSPITAL | Age: 74
Discharge: HOME | End: 2019-07-05
Attending: NURSE PRACTITIONER
Payer: COMMERCIAL

## 2019-07-05 ENCOUNTER — TELEPHONE (OUTPATIENT)
Dept: CARDIOTHORACIC SURGERY | Facility: CLINIC | Age: 74
End: 2019-07-05

## 2019-07-05 DIAGNOSIS — D15.1 PAPILLARY FIBROELASTOMA: ICD-10-CM

## 2019-07-05 PROCEDURE — 71250 CT THORAX DX C-: CPT

## 2019-07-05 NOTE — TELEPHONE ENCOUNTER
Spoke with patient, advised he may use this up until the day before surgery but was not to eat or drink anything after midnight the night before his case.

## 2019-07-05 NOTE — TELEPHONE ENCOUNTER
Called and let patient know we obtained dental evaluation from Dr. Araujo, patient has dormant infection, but not active, and therefore can have surgery next week and to continue his prep.

## 2019-07-09 ENCOUNTER — ANESTHESIA (INPATIENT)
Dept: OPERATING ROOM | Facility: HOSPITAL | Age: 74
DRG: 229 | End: 2019-07-09
Payer: COMMERCIAL

## 2019-07-09 ENCOUNTER — ANCILLARY PROCEDURE (INPATIENT)
Dept: OPERATING ROOM | Facility: HOSPITAL | Age: 74
DRG: 229 | End: 2019-07-09
Attending: ANESTHESIOLOGY
Payer: COMMERCIAL

## 2019-07-09 ENCOUNTER — PROCEDURE PASS (OUTPATIENT)
Age: 74
End: 2019-07-09
Payer: COMMERCIAL

## 2019-07-09 ENCOUNTER — APPOINTMENT (INPATIENT)
Dept: RADIOLOGY | Facility: HOSPITAL | Age: 74
DRG: 229 | End: 2019-07-09
Attending: PHYSICIAN ASSISTANT
Payer: COMMERCIAL

## 2019-07-09 ENCOUNTER — ANESTHESIA EVENT (INPATIENT)
Dept: OPERATING ROOM | Facility: HOSPITAL | Age: 74
DRG: 229 | End: 2019-07-09
Payer: COMMERCIAL

## 2019-07-09 ENCOUNTER — APPOINTMENT (INPATIENT)
Dept: RADIOLOGY | Facility: HOSPITAL | Age: 74
DRG: 229 | End: 2019-07-09
Attending: NURSE PRACTITIONER
Payer: COMMERCIAL

## 2019-07-09 ENCOUNTER — HOSPITAL ENCOUNTER (INPATIENT)
Facility: HOSPITAL | Age: 74
LOS: 6 days | Discharge: HOME HEALTH | DRG: 229 | End: 2019-07-15
Attending: THORACIC SURGERY (CARDIOTHORACIC VASCULAR SURGERY) | Admitting: THORACIC SURGERY (CARDIOTHORACIC VASCULAR SURGERY)
Payer: COMMERCIAL

## 2019-07-09 DIAGNOSIS — D15.1 PAPILLARY FIBROELASTOMA: Primary | ICD-10-CM

## 2019-07-09 LAB
ABO + RH BLD: NORMAL
ANION GAP SERPL CALC-SCNC: 11 MEQ/L (ref 3–15)
APTT PPP: 47 SEC (ref 23–35)
APTT PPP: 59 SEC (ref 23–35)
APTT PPP: 65 SEC (ref 23–35)
AT III ACT/NOR PPP CHRO: 94 % (ref 90–120)
BASE EXCESS BLDA CALC-SCNC: -0.3 MEQ/L
BASE EXCESS BLDA CALC-SCNC: 0.5 MEQ/L
BASE EXCESS BLDA CALC-SCNC: 0.6 MEQ/L
BASE EXCESS BLDA CALC-SCNC: 0.9 MEQ/L
BASE EXCESS BLDA CALC-SCNC: 1.1 MEQ/L
BASE EXCESS BLDA CALC-SCNC: 1.2 MEQ/L
BASE EXCESS BLDA CALC-SCNC: 1.3 MEQ/L
BASE EXCESS BLDA CALC-SCNC: 1.6 MEQ/L
BASE EXCESS BLDA CALC-SCNC: 2 MEQ/L
BASE EXCESS BLDA CALC-SCNC: 2 MEQ/L
BASE EXCESS BLDA CALC-SCNC: 3.3 MEQ/L
BUN SERPL-MCNC: 9 MG/DL (ref 8–20)
CA-I BLD-SCNC: 0.87 MMOL/L (ref 1.15–1.27)
CA-I BLD-SCNC: 0.94 MMOL/L (ref 1.15–1.27)
CA-I BLD-SCNC: 1.17 MMOL/L (ref 1.15–1.27)
CA-I BLD-SCNC: 1.17 MMOL/L (ref 1.15–1.27)
CA-I BLD-SCNC: 1.19 MMOL/L (ref 1.15–1.27)
CA-I BLD-SCNC: 1.2 MMOL/L (ref 1.15–1.27)
CA-I BLD-SCNC: 1.2 MMOL/L (ref 1.15–1.27)
CA-I BLD-SCNC: 1.21 MMOL/L (ref 1.15–1.27)
CA-I BLD-SCNC: 1.22 MMOL/L (ref 1.15–1.27)
CA-I BLD-SCNC: 1.27 MMOL/L (ref 1.15–1.27)
CA-I BLD-SCNC: 1.36 MMOL/L (ref 1.15–1.27)
CALCIUM SERPL-MCNC: 9.1 MG/DL (ref 8.9–10.3)
CFT BLD TEG: 1.6 MIN (ref 1–3)
CFT P HPASE BLD TEG: NORMAL MIN (ref 1–3)
CHLORIDE SERPL-SCNC: 103 MEQ/L (ref 98–109)
CLOT ANGLE BLD TEG: 68.1 DEGREES (ref 53–72)
CLOT ANGLE P HPASE BLD TEG: NORMAL DEGREES (ref 53–72)
CLOT INIT P HPASE BLD TEG: 5.3 MIN (ref 5–10)
CO2 BLDA-SCNC: 26 MEQ/L (ref 22–32)
CO2 BLDA-SCNC: 27 MEQ/L (ref 22–32)
CO2 BLDA-SCNC: 28 MEQ/L (ref 22–32)
CO2 BLDA-SCNC: 28 MEQ/L (ref 22–32)
CO2 BLDA-SCNC: 29 MEQ/L (ref 22–32)
CO2 SERPL-SCNC: 25 MEQ/L (ref 22–32)
CREAT SERPL-MCNC: 0.8 MG/DL
CT.EXTRINSIC BLD ROTEM: 5 MIN (ref 5–10)
D AG BLD QL: POSITIVE
ERYTHROCYTE [DISTWIDTH] IN BLOOD BY AUTOMATED COUNT: 12.6 % (ref 11.6–14.4)
ERYTHROCYTE [DISTWIDTH] IN BLOOD BY AUTOMATED COUNT: 12.7 % (ref 11.6–14.4)
ERYTHROCYTE [DISTWIDTH] IN BLOOD BY AUTOMATED COUNT: 12.8 % (ref 11.6–14.4)
ERYTHROCYTE [DISTWIDTH] IN BLOOD BY AUTOMATED COUNT: 13 % (ref 11.6–14.4)
FIBRINOGEN PPP-MCNC: 191 MG/DL (ref 220–480)
FIBRINOGEN PPP-MCNC: 221 MG/DL (ref 220–480)
FIBRINOGEN PPP-MCNC: 225 MG/DL (ref 220–480)
FIBRINOGEN PPP-MCNC: 240 MG/DL (ref 220–480)
FIBRINOGEN PPP-MCNC: 320 MG/DL (ref 220–480)
GFR SERPL CREATININE-BSD FRML MDRD: >60 ML/MIN/1.73M*2
GLUCOSE BLDA-MCNC: 123 MG/DL (ref 65–95)
GLUCOSE BLDA-MCNC: 123 MG/DL (ref 65–95)
GLUCOSE BLDA-MCNC: 148 MG/DL (ref 65–95)
GLUCOSE BLDA-MCNC: 156 MG/DL (ref 65–95)
GLUCOSE BLDA-MCNC: 159 MG/DL (ref 65–95)
GLUCOSE BLDA-MCNC: 163 MG/DL (ref 65–95)
GLUCOSE BLDA-MCNC: 164 MG/DL (ref 65–95)
GLUCOSE BLDA-MCNC: 170 MG/DL (ref 65–95)
GLUCOSE BLDA-MCNC: 170 MG/DL (ref 65–95)
GLUCOSE BLDA-MCNC: 171 MG/DL (ref 65–95)
GLUCOSE BLDA-MCNC: 175 MG/DL (ref 65–95)
GLUCOSE SERPL-MCNC: 163 MG/DL (ref 70–99)
HCO3 BLDA-SCNC: 25 MEQ/L (ref 21–28)
HCO3 BLDA-SCNC: 25 MEQ/L (ref 21–28)
HCO3 BLDA-SCNC: 26 MEQ/L (ref 21–28)
HCO3 BLDA-SCNC: 27 MEQ/L (ref 21–28)
HCO3 BLDA-SCNC: 28 MEQ/L (ref 21–28)
HCO3 BLDA-SCNC: 28 MEQ/L (ref 21–28)
HCT VFR BLDA CALC: 26 % (ref 36–48)
HCT VFR BLDA CALC: 30 % (ref 36–48)
HCT VFR BLDA CALC: 34 % (ref 36–48)
HCT VFR BLDA CALC: 36 % (ref 36–48)
HCT VFR BLDA CALC: 37 % (ref 36–48)
HCT VFR BLDA CALC: 38 % (ref 36–48)
HCT VFR BLDA CALC: 39 % (ref 36–48)
HCT VFR BLDA CALC: 40 % (ref 36–48)
HCT VFR BLDA CALC: 46 % (ref 36–48)
HCT VFR BLDCO AUTO: 30.1 %
HCT VFR BLDCO AUTO: 33.6 %
HCT VFR BLDCO AUTO: 34 %
HCT VFR BLDCO AUTO: 34.7 %
HCT VFR BLDCO AUTO: 35.7 %
HCT VFR BLDCO AUTO: 42.3 %
HGB BLD-MCNC: 10.2 G/DL
HGB BLD-MCNC: 11.2 G/DL
HGB BLD-MCNC: 11.3 G/DL
HGB BLD-MCNC: 11.8 G/DL
HGB BLD-MCNC: 11.9 G/DL
HGB BLD-MCNC: 14.4 G/DL
INR PPP: 1.3 INR
INR PPP: 1.5 INR
INR PPP: 1.6 INR
LABORATORY COMMENT REPORT: NORMAL
LACTATE BLDA-SCNC: 0.7 MMOL/L (ref 0.4–1.6)
LACTATE BLDA-SCNC: 0.9 MMOL/L (ref 0.4–1.6)
LACTATE BLDA-SCNC: 1 MMOL/L (ref 0.4–1.6)
LACTATE BLDA-SCNC: 1.2 MMOL/L (ref 0.4–1.6)
LACTATE BLDA-SCNC: 1.6 MMOL/L (ref 0.4–1.6)
LACTATE BLDA-SCNC: 1.7 MMOL/L (ref 0.4–1.6)
MAGNESIUM SERPL-MCNC: 2.2 MG/DL (ref 1.8–2.5)
MCF BLD TEG: 59.1 MM (ref 50–70)
MCF P HPASE BLD TEG: NORMAL MM (ref 50–70)
MCH RBC QN AUTO: 29.7 PG (ref 28–33.2)
MCH RBC QN AUTO: 29.8 PG (ref 28–33.2)
MCH RBC QN AUTO: 30 PG (ref 28–33.2)
MCH RBC QN AUTO: 30 PG (ref 28–33.2)
MCH RBC QN AUTO: 30.1 PG (ref 28–33.2)
MCH RBC QN AUTO: 30.2 PG (ref 28–33.2)
MCHC RBC AUTO-ENTMCNC: 32.9 G/DL (ref 32.2–36.5)
MCHC RBC AUTO-ENTMCNC: 33.1 G/DL (ref 32.2–36.5)
MCHC RBC AUTO-ENTMCNC: 33.6 G/DL (ref 32.2–36.5)
MCHC RBC AUTO-ENTMCNC: 33.9 G/DL (ref 32.2–36.5)
MCHC RBC AUTO-ENTMCNC: 34 G/DL (ref 32.2–36.5)
MCHC RBC AUTO-ENTMCNC: 34.3 G/DL (ref 32.2–36.5)
MCV RBC AUTO: 88.1 FL (ref 83–98)
MCV RBC AUTO: 88.1 FL (ref 83–98)
MCV RBC AUTO: 88.8 FL (ref 83–98)
MCV RBC AUTO: 89.1 FL (ref 83–98)
MCV RBC AUTO: 90.2 FL (ref 83–98)
MCV RBC AUTO: 90.2 FL (ref 83–98)
PCO2 BLDA: 35 MM HG (ref 35–48)
PCO2 BLDA: 38 MM HG (ref 35–48)
PCO2 BLDA: 40 MM HG (ref 35–48)
PCO2 BLDA: 40 MM HG (ref 35–48)
PCO2 BLDA: 41 MM HG (ref 35–48)
PCO2 BLDA: 45 MM HG (ref 35–48)
PCO2 BLDA: 48 MM HG (ref 35–48)
PCO2 BLDA: 50 MM HG (ref 35–48)
PCO2 BLDA: 50 MM HG (ref 35–48)
PDW BLD AUTO: 9.3 FL (ref 9.4–12.4)
PDW BLD AUTO: 9.4 FL (ref 9.4–12.4)
PDW BLD AUTO: 9.4 FL (ref 9.4–12.4)
PDW BLD AUTO: 9.6 FL (ref 9.4–12.4)
PDW BLD AUTO: 9.7 FL (ref 9.4–12.4)
PDW BLD AUTO: 9.9 FL (ref 9.4–12.4)
PH BLDA: 7.34 PH (ref 7.35–7.45)
PH BLDA: 7.34 PH (ref 7.35–7.45)
PH BLDA: 7.35 PH (ref 7.35–7.45)
PH BLDA: 7.38 PH (ref 7.35–7.45)
PH BLDA: 7.4 PH (ref 7.35–7.45)
PH BLDA: 7.41 PH (ref 7.35–7.45)
PH BLDA: 7.42 PH (ref 7.35–7.45)
PH BLDA: 7.43 PH (ref 7.35–7.45)
PH BLDA: 7.43 PH (ref 7.35–7.45)
PH BLDA: 7.44 PH (ref 7.35–7.45)
PH BLDA: 7.47 PH (ref 7.35–7.45)
PHOSPHATE SERPL-MCNC: 2.5 MG/DL (ref 2.4–4.7)
PLATELET # BLD AUTO: 188 K/UL
PLATELET # BLD AUTO: 229 K/UL
PLATELET # BLD AUTO: 254 K/UL
PLATELET # BLD AUTO: 261 K/UL
PLATELET # BLD AUTO: 277 K/UL
PLATELET # BLD AUTO: 298 K/UL
PO2 BLDA: 111 MM HG (ref 83–100)
PO2 BLDA: 117 MM HG (ref 83–100)
PO2 BLDA: 138 MM HG (ref 83–100)
PO2 BLDA: 140 MM HG (ref 83–100)
PO2 BLDA: 187 MM HG (ref 83–100)
PO2 BLDA: 262 MM HG (ref 83–100)
PO2 BLDA: 311 MM HG (ref 83–100)
PO2 BLDA: 312 MM HG (ref 83–100)
PO2 BLDA: 321 MM HG (ref 83–100)
PO2 BLDA: 407 MM HG (ref 83–100)
PO2 BLDA: >550 MM HG (ref 83–100)
POCT HDR AVG ACT 1&2: 363 SEC
POCT HDR AVG ACT 3&4: 266 SEC
POCT HDR AVG ACT 5&6: 138 SEC
POCT HDR BASELINE ACT: 138 SEC
POCT HDR PROJECTED HEPARIN CONC: 3.1 MG/KG
POCT HDR SLOPE: 80 SEC/UNIT/ML (ref 63–131)
POCT HDR TARGET ACT: 480 SEC
POCT PATIENT TEMPERATURE: 98.6 °F (ref 97–99)
POCT TEST (BLD GAS): ABNORMAL
POCT TEST: NORMAL
POTASSIUM BLDA-SCNC: 3.6 MEQ/L (ref 3.4–4.5)
POTASSIUM BLDA-SCNC: 3.7 MEQ/L (ref 3.4–4.5)
POTASSIUM BLDA-SCNC: 3.7 MEQ/L (ref 3.4–4.5)
POTASSIUM BLDA-SCNC: 3.8 MEQ/L (ref 3.4–4.5)
POTASSIUM BLDA-SCNC: 3.9 MEQ/L (ref 3.4–4.5)
POTASSIUM BLDA-SCNC: 4 MEQ/L (ref 3.4–4.5)
POTASSIUM BLDA-SCNC: 4.2 MEQ/L (ref 3.4–4.5)
POTASSIUM BLDA-SCNC: 4.4 MEQ/L (ref 3.4–4.5)
POTASSIUM BLDA-SCNC: 4.8 MEQ/L (ref 3.4–4.5)
POTASSIUM SERPL-SCNC: 4.2 MEQ/L (ref 3.6–5.1)
POTASSIUM SERPL-SCNC: 4.2 MEQ/L (ref 3.6–5.1)
PROTHROMBIN TIME: 15.7 SEC (ref 12.2–14.5)
PROTHROMBIN TIME: 17.1 SEC (ref 12.2–14.5)
PROTHROMBIN TIME: 18.2 SEC (ref 12.2–14.5)
RBC # BLD AUTO: 3.39 M/UL (ref 4.5–5.8)
RBC # BLD AUTO: 3.77 M/UL (ref 4.5–5.8)
RBC # BLD AUTO: 3.77 M/UL (ref 4.5–5.8)
RBC # BLD AUTO: 3.94 M/UL (ref 4.5–5.8)
RBC # BLD AUTO: 3.96 M/UL (ref 4.5–5.8)
RBC # BLD AUTO: 4.8 M/UL (ref 4.5–5.8)
SAO2 % BLDA: 100 % (ref 93–98)
SAO2 % BLDA: 98 % (ref 93–98)
SAO2 % BLDA: 99 % (ref 93–98)
SODIUM BLDA-SCNC: 123 MEQ/L (ref 136–145)
SODIUM BLDA-SCNC: 129 MEQ/L (ref 136–145)
SODIUM BLDA-SCNC: 132 MEQ/L (ref 136–145)
SODIUM BLDA-SCNC: 135 MEQ/L (ref 136–145)
SODIUM BLDA-SCNC: 135 MEQ/L (ref 136–145)
SODIUM SERPL-SCNC: 139 MEQ/L (ref 136–144)
TEG SPECIMEN TYPE (HEP): NORMAL
TEG SPECIMEN TYPE: NORMAL
WBC # BLD AUTO: 10.17 K/UL
WBC # BLD AUTO: 10.73 K/UL
WBC # BLD AUTO: 14.26 K/UL
WBC # BLD AUTO: 15.48 K/UL
WBC # BLD AUTO: 5.38 K/UL
WBC # BLD AUTO: 9.8 K/UL

## 2019-07-09 PROCEDURE — 85730 THROMBOPLASTIN TIME PARTIAL: CPT | Performed by: THORACIC SURGERY (CARDIOTHORACIC VASCULAR SURGERY)

## 2019-07-09 PROCEDURE — 37000001 HC ANESTHESIA GENERAL: Performed by: THORACIC SURGERY (CARDIOTHORACIC VASCULAR SURGERY)

## 2019-07-09 PROCEDURE — 85384 FIBRINOGEN ACTIVITY: CPT | Performed by: ANESTHESIOLOGY

## 2019-07-09 PROCEDURE — 85610 PROTHROMBIN TIME: CPT | Performed by: PHYSICIAN ASSISTANT

## 2019-07-09 PROCEDURE — 25000000 HC PHARMACY GENERAL

## 2019-07-09 PROCEDURE — 84100 ASSAY OF PHOSPHORUS: CPT | Performed by: PHYSICIAN ASSISTANT

## 2019-07-09 PROCEDURE — 36000010 HC CARDIO-PULMONARY BYPASS

## 2019-07-09 PROCEDURE — 5A1221Z PERFORMANCE OF CARDIAC OUTPUT, CONTINUOUS: ICD-10-PCS | Performed by: THORACIC SURGERY (CARDIOTHORACIC VASCULAR SURGERY)

## 2019-07-09 PROCEDURE — 63600000 HC DRUGS/DETAIL CODE: Performed by: PHYSICIAN ASSISTANT

## 2019-07-09 PROCEDURE — 83735 ASSAY OF MAGNESIUM: CPT | Performed by: PHYSICIAN ASSISTANT

## 2019-07-09 PROCEDURE — 25000000 HC PHARMACY GENERAL: Performed by: THORACIC SURGERY (CARDIOTHORACIC VASCULAR SURGERY)

## 2019-07-09 PROCEDURE — 94002 VENT MGMT INPAT INIT DAY: CPT

## 2019-07-09 PROCEDURE — 85384 FIBRINOGEN ACTIVITY: CPT | Performed by: PHYSICIAN ASSISTANT

## 2019-07-09 PROCEDURE — C1769 GUIDE WIRE: HCPCS | Performed by: THORACIC SURGERY (CARDIOTHORACIC VASCULAR SURGERY)

## 2019-07-09 PROCEDURE — B246ZZ4 ULTRASONOGRAPHY OF RIGHT AND LEFT HEART, TRANSESOPHAGEAL: ICD-10-PCS | Performed by: THORACIC SURGERY (CARDIOTHORACIC VASCULAR SURGERY)

## 2019-07-09 PROCEDURE — 02BG0ZZ EXCISION OF MITRAL VALVE, OPEN APPROACH: ICD-10-PCS | Performed by: THORACIC SURGERY (CARDIOTHORACIC VASCULAR SURGERY)

## 2019-07-09 PROCEDURE — 71045 X-RAY EXAM CHEST 1 VIEW: CPT

## 2019-07-09 PROCEDURE — 36000025 HC CELL SAVER PROCED

## 2019-07-09 PROCEDURE — 63600000 HC DRUGS/DETAIL CODE

## 2019-07-09 PROCEDURE — 85384 FIBRINOGEN ACTIVITY: CPT | Performed by: THORACIC SURGERY (CARDIOTHORACIC VASCULAR SURGERY)

## 2019-07-09 PROCEDURE — 0W9930Z DRAINAGE OF RIGHT PLEURAL CAVITY WITH DRAINAGE DEVICE, PERCUTANEOUS APPROACH: ICD-10-PCS | Performed by: THORACIC SURGERY (CARDIOTHORACIC VASCULAR SURGERY)

## 2019-07-09 PROCEDURE — 33120 EXC ICAR TUM RESC W/CARD BYP: CPT | Mod: AS | Performed by: PHYSICIAN ASSISTANT

## 2019-07-09 PROCEDURE — 85576 BLOOD PLATELET AGGREGATION: CPT | Performed by: ANESTHESIOLOGY

## 2019-07-09 PROCEDURE — 80048 BASIC METABOLIC PNL TOTAL CA: CPT | Performed by: PHYSICIAN ASSISTANT

## 2019-07-09 PROCEDURE — 27800000 HC SUPPLY/IMPLANTS: Performed by: THORACIC SURGERY (CARDIOTHORACIC VASCULAR SURGERY)

## 2019-07-09 PROCEDURE — 84132 ASSAY OF SERUM POTASSIUM: CPT | Performed by: PHYSICIAN ASSISTANT

## 2019-07-09 PROCEDURE — 25000000 HC PHARMACY GENERAL: Performed by: PHYSICIAN ASSISTANT

## 2019-07-09 PROCEDURE — 85730 THROMBOPLASTIN TIME PARTIAL: CPT | Performed by: PHYSICIAN ASSISTANT

## 2019-07-09 PROCEDURE — 33120 EXC ICAR TUM RESC W/CARD BYP: CPT | Performed by: THORACIC SURGERY (CARDIOTHORACIC VASCULAR SURGERY)

## 2019-07-09 PROCEDURE — 36430 TRANSFUSION BLD/BLD COMPNT: CPT | Performed by: ANESTHESIOLOGY

## 2019-07-09 PROCEDURE — P9037 PLATE PHERES LEUKOREDU IRRAD: HCPCS

## 2019-07-09 PROCEDURE — P9035 PLATELET PHERES LEUKOREDUCED: HCPCS

## 2019-07-09 PROCEDURE — 82803 BLOOD GASES ANY COMBINATION: CPT

## 2019-07-09 PROCEDURE — 85347 COAGULATION TIME ACTIVATED: CPT

## 2019-07-09 PROCEDURE — 20000000 HC ROOM AND CARE ICU

## 2019-07-09 PROCEDURE — 25800000 HC PHARMACY IV SOLUTIONS: Performed by: ANESTHESIOLOGY

## 2019-07-09 PROCEDURE — 87641 MR-STAPH DNA AMP PROBE: CPT | Performed by: THORACIC SURGERY (CARDIOTHORACIC VASCULAR SURGERY)

## 2019-07-09 PROCEDURE — 85027 COMPLETE CBC AUTOMATED: CPT | Performed by: THORACIC SURGERY (CARDIOTHORACIC VASCULAR SURGERY)

## 2019-07-09 PROCEDURE — 27200000 HC STERILE SUPPLY

## 2019-07-09 PROCEDURE — 25000000 HC PHARMACY GENERAL: Performed by: ANESTHESIOLOGY

## 2019-07-09 PROCEDURE — 93312 ECHO TRANSESOPHAGEAL: CPT | Performed by: ANESTHESIOLOGY

## 2019-07-09 PROCEDURE — 85610 PROTHROMBIN TIME: CPT | Performed by: THORACIC SURGERY (CARDIOTHORACIC VASCULAR SURGERY)

## 2019-07-09 PROCEDURE — 84132 ASSAY OF SERUM POTASSIUM: CPT | Performed by: THORACIC SURGERY (CARDIOTHORACIC VASCULAR SURGERY)

## 2019-07-09 PROCEDURE — 36000006 HC OR LEVEL 6 INITIAL 30MIN: Performed by: THORACIC SURGERY (CARDIOTHORACIC VASCULAR SURGERY)

## 2019-07-09 PROCEDURE — 36415 COLL VENOUS BLD VENIPUNCTURE: CPT | Performed by: THORACIC SURGERY (CARDIOTHORACIC VASCULAR SURGERY)

## 2019-07-09 PROCEDURE — 85300 ANTITHROMBIN III ACTIVITY: CPT | Performed by: THORACIC SURGERY (CARDIOTHORACIC VASCULAR SURGERY)

## 2019-07-09 PROCEDURE — 63600000 HC DRUGS/DETAIL CODE: Performed by: ANESTHESIOLOGY

## 2019-07-09 PROCEDURE — P9047 ALBUMIN (HUMAN), 25%, 50ML: HCPCS

## 2019-07-09 PROCEDURE — P9045 ALBUMIN (HUMAN), 5%, 250 ML: HCPCS | Mod: JW | Performed by: ANESTHESIOLOGY

## 2019-07-09 PROCEDURE — 93005 ELECTROCARDIOGRAM TRACING: CPT | Performed by: PHYSICIAN ASSISTANT

## 2019-07-09 PROCEDURE — 25800000 HC PHARMACY IV SOLUTIONS

## 2019-07-09 PROCEDURE — 88305 TISSUE EXAM BY PATHOLOGIST: CPT | Performed by: THORACIC SURGERY (CARDIOTHORACIC VASCULAR SURGERY)

## 2019-07-09 PROCEDURE — 27200000 HC STERILE SUPPLY: Performed by: THORACIC SURGERY (CARDIOTHORACIC VASCULAR SURGERY)

## 2019-07-09 PROCEDURE — 83735 ASSAY OF MAGNESIUM: CPT | Performed by: THORACIC SURGERY (CARDIOTHORACIC VASCULAR SURGERY)

## 2019-07-09 PROCEDURE — 85027 COMPLETE CBC AUTOMATED: CPT | Performed by: PHYSICIAN ASSISTANT

## 2019-07-09 PROCEDURE — 36000016 HC OR LEVEL 6 EA ADDL MIN: Performed by: THORACIC SURGERY (CARDIOTHORACIC VASCULAR SURGERY)

## 2019-07-09 RX ORDER — CHLORHEXIDINE GLUCONATE ORAL RINSE 1.2 MG/ML
15 SOLUTION DENTAL ONCE
Status: DISCONTINUED | OUTPATIENT
Start: 2019-07-09 | End: 2019-07-09 | Stop reason: HOSPADM

## 2019-07-09 RX ORDER — HEPARIN SODIUM 1000 [USP'U]/ML
INJECTION, SOLUTION INTRAVENOUS; SUBCUTANEOUS AS NEEDED
Status: DISCONTINUED | OUTPATIENT
Start: 2019-07-09 | End: 2019-07-09 | Stop reason: SURG

## 2019-07-09 RX ORDER — KETOROLAC TROMETHAMINE 30 MG/ML
INJECTION, SOLUTION INTRAMUSCULAR; INTRAVENOUS AS NEEDED
Status: DISCONTINUED | OUTPATIENT
Start: 2019-07-09 | End: 2019-07-09

## 2019-07-09 RX ORDER — FUROSEMIDE 40 MG/1
40 TABLET ORAL DAILY
Status: DISCONTINUED | OUTPATIENT
Start: 2019-07-13 | End: 2019-07-11

## 2019-07-09 RX ORDER — MEPERIDINE HYDROCHLORIDE 25 MG/ML
12.5 INJECTION INTRAMUSCULAR; INTRAVENOUS; SUBCUTANEOUS AS NEEDED
Status: DISCONTINUED | OUTPATIENT
Start: 2019-07-09 | End: 2019-07-11

## 2019-07-09 RX ORDER — ROSUVASTATIN CALCIUM 10 MG/1
10 TABLET, COATED ORAL NIGHTLY
Status: DISCONTINUED | OUTPATIENT
Start: 2019-07-09 | End: 2019-07-15 | Stop reason: HOSPADM

## 2019-07-09 RX ORDER — FLUTICASONE PROPIONATE 50 MCG
1 SPRAY, SUSPENSION (ML) NASAL NIGHTLY
Status: DISCONTINUED | OUTPATIENT
Start: 2019-07-09 | End: 2019-07-15 | Stop reason: HOSPADM

## 2019-07-09 RX ORDER — FAMOTIDINE 10 MG/ML
20 INJECTION INTRAVENOUS 2 TIMES DAILY
Status: DISCONTINUED | OUTPATIENT
Start: 2019-07-09 | End: 2019-07-15

## 2019-07-09 RX ORDER — DOBUTAMINE HYDROCHLORIDE 200 MG/100ML
INJECTION INTRAVENOUS CONTINUOUS PRN
Status: DISCONTINUED | OUTPATIENT
Start: 2019-07-09 | End: 2019-07-09 | Stop reason: SURG

## 2019-07-09 RX ORDER — EPINEPHRINE HCL IN 0.9 % NACL 4MG/250ML
1-20 PLASTIC BAG, INJECTION (ML) INTRAVENOUS
Status: DISCONTINUED | OUTPATIENT
Start: 2019-07-09 | End: 2019-07-09

## 2019-07-09 RX ORDER — NOREPINEPHRINE BITARTRATE 0.02 MG/ML
.5-3 INJECTION, SOLUTION INTRAVENOUS
Status: DISCONTINUED | OUTPATIENT
Start: 2019-07-09 | End: 2019-07-09

## 2019-07-09 RX ORDER — ACETAMINOPHEN 325 MG/1
650 TABLET ORAL EVERY 4 HOURS PRN
Status: DISCONTINUED | OUTPATIENT
Start: 2019-07-09 | End: 2019-07-10

## 2019-07-09 RX ORDER — POTASSIUM CHLORIDE 14.9 MG/ML
INJECTION INTRAVENOUS AS NEEDED
Status: DISCONTINUED | OUTPATIENT
Start: 2019-07-09 | End: 2019-07-09 | Stop reason: SURG

## 2019-07-09 RX ORDER — EPINEPHRINE HCL IN 0.9 % NACL 4MG/250ML
PLASTIC BAG, INJECTION (ML) INTRAVENOUS CONTINUOUS PRN
Status: DISCONTINUED | OUTPATIENT
Start: 2019-07-09 | End: 2019-07-09 | Stop reason: SURG

## 2019-07-09 RX ORDER — POTASSIUM CHLORIDE 750 MG/1
20 TABLET, FILM COATED, EXTENDED RELEASE ORAL 2 TIMES DAILY
Status: DISCONTINUED | OUTPATIENT
Start: 2019-07-09 | End: 2019-07-15

## 2019-07-09 RX ORDER — ONDANSETRON 4 MG/1
4 TABLET, ORALLY DISINTEGRATING ORAL EVERY 8 HOURS PRN
Status: DISCONTINUED | OUTPATIENT
Start: 2019-07-09 | End: 2019-07-15 | Stop reason: HOSPADM

## 2019-07-09 RX ORDER — BUPIVACAINE HYDROCHLORIDE 5 MG/ML
INJECTION, SOLUTION EPIDURAL; INTRACAUDAL AS NEEDED
Status: DISCONTINUED | OUTPATIENT
Start: 2019-07-09 | End: 2019-07-09 | Stop reason: HOSPADM

## 2019-07-09 RX ORDER — ASPIRIN 81 MG/1
81 TABLET ORAL DAILY
Status: DISCONTINUED | OUTPATIENT
Start: 2019-07-09 | End: 2019-07-09

## 2019-07-09 RX ORDER — HEPARIN SODIUM 1000 [USP'U]/ML
INJECTION, SOLUTION INTRAVENOUS; SUBCUTANEOUS AS NEEDED
Status: DISCONTINUED | OUTPATIENT
Start: 2019-07-09 | End: 2019-07-09 | Stop reason: HOSPADM

## 2019-07-09 RX ORDER — CHLORHEXIDINE GLUCONATE ORAL RINSE 1.2 MG/ML
15 SOLUTION DENTAL
Status: DISCONTINUED | OUTPATIENT
Start: 2019-07-09 | End: 2019-07-15 | Stop reason: HOSPADM

## 2019-07-09 RX ORDER — FUROSEMIDE 40 MG/1
40 TABLET ORAL
Status: DISCONTINUED | OUTPATIENT
Start: 2019-07-10 | End: 2019-07-11

## 2019-07-09 RX ORDER — ALBUMIN HUMAN 250 G/1000ML
SOLUTION INTRAVENOUS AS NEEDED
Status: DISCONTINUED | OUTPATIENT
Start: 2019-07-09 | End: 2019-07-09 | Stop reason: HOSPADM

## 2019-07-09 RX ORDER — SENNOSIDES 8.6 MG/1
1 TABLET ORAL 2 TIMES DAILY PRN
Status: DISCONTINUED | OUTPATIENT
Start: 2019-07-09 | End: 2019-07-15 | Stop reason: HOSPADM

## 2019-07-09 RX ORDER — ONDANSETRON HYDROCHLORIDE 2 MG/ML
4 INJECTION, SOLUTION INTRAVENOUS EVERY 8 HOURS PRN
Status: DISCONTINUED | OUTPATIENT
Start: 2019-07-09 | End: 2019-07-15 | Stop reason: HOSPADM

## 2019-07-09 RX ORDER — COLCHICINE 0.6 MG/1
0.6 TABLET ORAL DAILY
Status: DISCONTINUED | OUTPATIENT
Start: 2019-07-09 | End: 2019-07-15 | Stop reason: HOSPADM

## 2019-07-09 RX ORDER — NOREPINEPHRINE BITARTRATE 1 MG/ML
INJECTION, SOLUTION INTRAVENOUS AS NEEDED
Status: DISCONTINUED | OUTPATIENT
Start: 2019-07-09 | End: 2019-07-09 | Stop reason: HOSPADM

## 2019-07-09 RX ORDER — LANOLIN ALCOHOL/MO/W.PET/CERES
400 CREAM (GRAM) TOPICAL 2 TIMES DAILY
Status: DISCONTINUED | OUTPATIENT
Start: 2019-07-09 | End: 2019-07-15 | Stop reason: HOSPADM

## 2019-07-09 RX ORDER — POTASSIUM CHLORIDE 14.9 MG/ML
20 INJECTION INTRAVENOUS EVERY 8 HOURS PRN
Status: DISCONTINUED | OUTPATIENT
Start: 2019-07-09 | End: 2019-07-15 | Stop reason: HOSPADM

## 2019-07-09 RX ORDER — ALBUTEROL SULFATE 90 UG/1
2 INHALANT RESPIRATORY (INHALATION) EVERY 6 HOURS PRN
Status: DISCONTINUED | OUTPATIENT
Start: 2019-07-09 | End: 2019-07-09

## 2019-07-09 RX ORDER — LIDOCAINE HYDROCHLORIDE 10 MG/ML
INJECTION, SOLUTION INFILTRATION; PERINEURAL AS NEEDED
Status: DISCONTINUED | OUTPATIENT
Start: 2019-07-09 | End: 2019-07-09 | Stop reason: HOSPADM

## 2019-07-09 RX ORDER — ONDANSETRON HYDROCHLORIDE 2 MG/ML
INJECTION, SOLUTION INTRAVENOUS AS NEEDED
Status: DISCONTINUED | OUTPATIENT
Start: 2019-07-09 | End: 2019-07-09 | Stop reason: SURG

## 2019-07-09 RX ORDER — FENTANYL CITRATE 50 UG/ML
INJECTION, SOLUTION INTRAMUSCULAR; INTRAVENOUS AS NEEDED
Status: DISCONTINUED | OUTPATIENT
Start: 2019-07-09 | End: 2019-07-09 | Stop reason: SURG

## 2019-07-09 RX ORDER — SODIUM BICARBONATE 1 MEQ/ML
SYRINGE (ML) INTRAVENOUS AS NEEDED
Status: DISCONTINUED | OUTPATIENT
Start: 2019-07-09 | End: 2019-07-09 | Stop reason: HOSPADM

## 2019-07-09 RX ORDER — PROTAMINE SULFATE 10 MG/ML
INJECTION, SOLUTION INTRAVENOUS AS NEEDED
Status: DISCONTINUED | OUTPATIENT
Start: 2019-07-09 | End: 2019-07-09 | Stop reason: SURG

## 2019-07-09 RX ORDER — METOPROLOL SUCCINATE 25 MG/1
50 TABLET, EXTENDED RELEASE ORAL NIGHTLY
Status: DISCONTINUED | OUTPATIENT
Start: 2019-07-09 | End: 2019-07-12

## 2019-07-09 RX ORDER — LIDOCAINE HYDROCHLORIDE 10 MG/ML
INJECTION, SOLUTION INFILTRATION; PERINEURAL AS NEEDED
Status: DISCONTINUED | OUTPATIENT
Start: 2019-07-09 | End: 2019-07-09 | Stop reason: SURG

## 2019-07-09 RX ORDER — MONTELUKAST SODIUM 10 MG/1
10 TABLET ORAL NIGHTLY
Status: DISCONTINUED | OUTPATIENT
Start: 2019-07-09 | End: 2019-07-15 | Stop reason: HOSPADM

## 2019-07-09 RX ORDER — FAMOTIDINE 20 MG/1
20 TABLET, FILM COATED ORAL 2 TIMES DAILY
Status: DISCONTINUED | OUTPATIENT
Start: 2019-07-09 | End: 2019-07-15 | Stop reason: HOSPADM

## 2019-07-09 RX ORDER — PROPOFOL 10 MG/ML
INJECTION, EMULSION INTRAVENOUS AS NEEDED
Status: DISCONTINUED | OUTPATIENT
Start: 2019-07-09 | End: 2019-07-09 | Stop reason: SURG

## 2019-07-09 RX ORDER — ALBUMIN HUMAN 50 G/1000ML
500 SOLUTION INTRAVENOUS AS NEEDED
Status: DISCONTINUED | OUTPATIENT
Start: 2019-07-09 | End: 2019-07-15 | Stop reason: HOSPADM

## 2019-07-09 RX ORDER — HYDROMORPHONE HYDROCHLORIDE 1 MG/ML
.25-.5 INJECTION, SOLUTION INTRAMUSCULAR; INTRAVENOUS; SUBCUTANEOUS
Status: DISCONTINUED | OUTPATIENT
Start: 2019-07-09 | End: 2019-07-14

## 2019-07-09 RX ORDER — NOREPINEPHRINE BITARTRATE 0.02 MG/ML
INJECTION, SOLUTION INTRAVENOUS CONTINUOUS PRN
Status: DISCONTINUED | OUTPATIENT
Start: 2019-07-09 | End: 2019-07-09 | Stop reason: SURG

## 2019-07-09 RX ORDER — DEXAMETHASONE SODIUM PHOSPHATE 4 MG/ML
INJECTION, SOLUTION INTRA-ARTICULAR; INTRALESIONAL; INTRAMUSCULAR; INTRAVENOUS; SOFT TISSUE AS NEEDED
Status: DISCONTINUED | OUTPATIENT
Start: 2019-07-09 | End: 2019-07-09 | Stop reason: SURG

## 2019-07-09 RX ORDER — FLUTICASONE FUROATE AND VILANTEROL 100; 25 UG/1; UG/1
1 POWDER RESPIRATORY (INHALATION) DAILY
Status: DISCONTINUED | OUTPATIENT
Start: 2019-07-09 | End: 2019-07-15 | Stop reason: HOSPADM

## 2019-07-09 RX ORDER — KETOROLAC TROMETHAMINE 15 MG/ML
15 INJECTION, SOLUTION INTRAMUSCULAR; INTRAVENOUS EVERY 6 HOURS PRN
Status: DISPENSED | OUTPATIENT
Start: 2019-07-09 | End: 2019-07-14

## 2019-07-09 RX ORDER — SODIUM CHLORIDE 9 MG/ML
INJECTION, SOLUTION INTRAVENOUS CONTINUOUS PRN
Status: DISCONTINUED | OUTPATIENT
Start: 2019-07-09 | End: 2019-07-09 | Stop reason: SURG

## 2019-07-09 RX ORDER — CEFAZOLIN SODIUM 2 G/50ML
2 SOLUTION INTRAVENOUS ONCE
Status: DISCONTINUED | OUTPATIENT
Start: 2019-07-09 | End: 2019-07-09 | Stop reason: HOSPADM

## 2019-07-09 RX ORDER — ALUMINUM HYDROXIDE, MAGNESIUM HYDROXIDE, AND SIMETHICONE 1200; 120; 1200 MG/30ML; MG/30ML; MG/30ML
30 SUSPENSION ORAL EVERY 4 HOURS PRN
Status: DISCONTINUED | OUTPATIENT
Start: 2019-07-09 | End: 2019-07-15 | Stop reason: HOSPADM

## 2019-07-09 RX ORDER — BISACODYL 10 MG/1
10 SUPPOSITORY RECTAL AS NEEDED
Status: DISCONTINUED | OUTPATIENT
Start: 2019-07-12 | End: 2019-07-13

## 2019-07-09 RX ORDER — OXYCODONE HYDROCHLORIDE 5 MG/1
5-10 TABLET ORAL EVERY 4 HOURS PRN
Status: DISCONTINUED | OUTPATIENT
Start: 2019-07-09 | End: 2019-07-15 | Stop reason: HOSPADM

## 2019-07-09 RX ORDER — CEFAZOLIN SODIUM 1 G/50ML
SOLUTION INTRAVENOUS AS NEEDED
Status: DISCONTINUED | OUTPATIENT
Start: 2019-07-09 | End: 2019-07-09 | Stop reason: SURG

## 2019-07-09 RX ORDER — DOBUTAMINE HYDROCHLORIDE 200 MG/100ML
1-5 INJECTION INTRAVENOUS CONTINUOUS
Status: DISCONTINUED | OUTPATIENT
Start: 2019-07-09 | End: 2019-07-13

## 2019-07-09 RX ORDER — ROCURONIUM BROMIDE 10 MG/ML
INJECTION, SOLUTION INTRAVENOUS AS NEEDED
Status: DISCONTINUED | OUTPATIENT
Start: 2019-07-09 | End: 2019-07-09 | Stop reason: SURG

## 2019-07-09 RX ORDER — ALBUTEROL SULFATE 0.83 MG/ML
2.5 SOLUTION RESPIRATORY (INHALATION) EVERY 6 HOURS PRN
Status: DISCONTINUED | OUTPATIENT
Start: 2019-07-09 | End: 2019-07-15 | Stop reason: HOSPADM

## 2019-07-09 RX ORDER — CEFAZOLIN SODIUM 2 G/50ML
2 SOLUTION INTRAVENOUS
Status: COMPLETED | OUTPATIENT
Start: 2019-07-10 | End: 2019-07-10

## 2019-07-09 RX ORDER — HYDROMORPHONE HYDROCHLORIDE 2 MG/ML
INJECTION, SOLUTION INTRAMUSCULAR; INTRAVENOUS; SUBCUTANEOUS AS NEEDED
Status: DISCONTINUED | OUTPATIENT
Start: 2019-07-09 | End: 2019-07-09 | Stop reason: SURG

## 2019-07-09 RX ORDER — MEPERIDINE HYDROCHLORIDE 50 MG/ML
12.5 INJECTION INTRAMUSCULAR; INTRAVENOUS; SUBCUTANEOUS AS NEEDED
Status: DISCONTINUED | OUTPATIENT
Start: 2019-07-09 | End: 2019-07-09

## 2019-07-09 RX ORDER — DOCUSATE SODIUM 100 MG/1
100 CAPSULE, LIQUID FILLED ORAL 2 TIMES DAILY
Status: DISCONTINUED | OUTPATIENT
Start: 2019-07-09 | End: 2019-07-15 | Stop reason: HOSPADM

## 2019-07-09 RX ORDER — METOPROLOL SUCCINATE 25 MG/1
25 TABLET, EXTENDED RELEASE ORAL NIGHTLY
Status: DISCONTINUED | OUTPATIENT
Start: 2019-07-09 | End: 2019-07-12

## 2019-07-09 RX ORDER — AMINOCAPROIC ACID 250 MG/ML
INJECTION, SOLUTION INTRAVENOUS AS NEEDED
Status: DISCONTINUED | OUTPATIENT
Start: 2019-07-09 | End: 2019-07-09 | Stop reason: HOSPADM

## 2019-07-09 RX ORDER — HYDROMORPHONE HYDROCHLORIDE 1 MG/ML
INJECTION, SOLUTION INTRAMUSCULAR; INTRAVENOUS; SUBCUTANEOUS
Status: COMPLETED
Start: 2019-07-09 | End: 2019-07-09

## 2019-07-09 RX ORDER — ALBUMIN HUMAN 50 G/1000ML
SOLUTION INTRAVENOUS AS NEEDED
Status: DISCONTINUED | OUTPATIENT
Start: 2019-07-09 | End: 2019-07-09 | Stop reason: SURG

## 2019-07-09 RX ORDER — DEXTROSE 50 % IN WATER (D50W) INTRAVENOUS SYRINGE
10-30 AS NEEDED
Status: DISCONTINUED | OUTPATIENT
Start: 2019-07-09 | End: 2019-07-13

## 2019-07-09 RX ADMIN — PROPOFOL 50 MG: 10 INJECTION, EMULSION INTRAVENOUS at 14:07

## 2019-07-09 RX ADMIN — LIDOCAINE HYDROCHLORIDE 10 ML: 10 INJECTION, SOLUTION INFILTRATION; PERINEURAL at 14:07

## 2019-07-09 RX ADMIN — CEFAZOLIN SODIUM 1 G: 1 SOLUTION INTRAVENOUS at 17:27

## 2019-07-09 RX ADMIN — SODIUM CHLORIDE, SODIUM GLUCONATE, SODIUM ACETATE, POTASSIUM CHLORIDE AND MAGNESIUM CHLORIDE 1200 ML: 526; 502; 368; 37; 30 INJECTION, SOLUTION INTRAVENOUS at 15:51

## 2019-07-09 RX ADMIN — Medication 2 MCG/MIN: at 16:50

## 2019-07-09 RX ADMIN — SODIUM CHLORIDE 1 G/HR: 900 INJECTION, SOLUTION INTRAVENOUS at 14:27

## 2019-07-09 RX ADMIN — SODIUM CHLORIDE: 9 INJECTION, SOLUTION INTRAVENOUS at 14:27

## 2019-07-09 RX ADMIN — FAMOTIDINE 20 MG: 10 INJECTION INTRAVENOUS at 22:54

## 2019-07-09 RX ADMIN — HYDROMORPHONE HYDROCHLORIDE 0.5 MG: 1 INJECTION, SOLUTION INTRAMUSCULAR; INTRAVENOUS; SUBCUTANEOUS at 20:26

## 2019-07-09 RX ADMIN — NOREPINEPHRINE BITARTRATE 0.02 MG: 1 INJECTION INTRAVENOUS at 15:52

## 2019-07-09 RX ADMIN — NOREPINEPHRINE BITARTRATE 0.02 MG: 1 INJECTION INTRAVENOUS at 15:57

## 2019-07-09 RX ADMIN — ROCURONIUM BROMIDE 100 MG: 10 INJECTION, SOLUTION INTRAVENOUS at 14:08

## 2019-07-09 RX ADMIN — ALBUMIN (HUMAN) 100 ML: 0.25 INJECTION, SOLUTION INTRAVENOUS at 15:51

## 2019-07-09 RX ADMIN — ALBUMIN (HUMAN) 250 ML: 12.5 INJECTION, SOLUTION INTRAVENOUS at 17:30

## 2019-07-09 RX ADMIN — DOBUTAMINE IN DEXTROSE 2.5 MCG/KG/MIN: 200 INJECTION, SOLUTION INTRAVENOUS at 16:46

## 2019-07-09 RX ADMIN — ROCURONIUM BROMIDE 10 MG: 10 INJECTION, SOLUTION INTRAVENOUS at 17:34

## 2019-07-09 RX ADMIN — HEPARIN SODIUM 10000 UNITS: 1000 INJECTION, SOLUTION INTRAVENOUS; SUBCUTANEOUS at 16:19

## 2019-07-09 RX ADMIN — DEXMEDETOMIDINE HYDROCHLORIDE 0.5 MCG/KG/HR: 100 INJECTION, SOLUTION INTRAVENOUS at 15:09

## 2019-07-09 RX ADMIN — POTASSIUM CHLORIDE 20 MEQ: 200 INJECTION, SOLUTION INTRAVENOUS at 18:53

## 2019-07-09 RX ADMIN — SODIUM CHLORIDE 10 G: 900 INJECTION, SOLUTION INTRAVENOUS at 14:27

## 2019-07-09 RX ADMIN — Medication: at 15:54

## 2019-07-09 RX ADMIN — ROCURONIUM BROMIDE 10 MG: 10 INJECTION, SOLUTION INTRAVENOUS at 18:40

## 2019-07-09 RX ADMIN — NOREPINEPHRINE BITARTRATE 2 MCG/MIN: at 15:58

## 2019-07-09 RX ADMIN — ALBUMIN (HUMAN) 500 ML: 12.5 INJECTION, SOLUTION INTRAVENOUS at 18:53

## 2019-07-09 RX ADMIN — PROPOFOL 50 MG: 10 INJECTION, EMULSION INTRAVENOUS at 14:09

## 2019-07-09 RX ADMIN — LIDOCAINE HYDROCHLORIDE 100 MG: 10 INJECTION, SOLUTION INFILTRATION; PERINEURAL at 16:40

## 2019-07-09 RX ADMIN — AMINOCAPROIC ACID 5000 MG: 250 INJECTION, SOLUTION INTRAVENOUS at 15:51

## 2019-07-09 RX ADMIN — HYDROMORPHONE HYDROCHLORIDE 2 MG: 2 INJECTION, SOLUTION INTRAMUSCULAR; INTRAVENOUS; SUBCUTANEOUS at 14:54

## 2019-07-09 RX ADMIN — ONDANSETRON 4 MG: 2 INJECTION INTRAMUSCULAR; INTRAVENOUS at 17:41

## 2019-07-09 RX ADMIN — POTASSIUM CHLORIDE 20 MEQ: 14.9 INJECTION, SOLUTION INTRAVENOUS at 21:08

## 2019-07-09 RX ADMIN — NOREPINEPHRINE BITARTRATE 0.03 MG: 1 INJECTION INTRAVENOUS at 16:01

## 2019-07-09 RX ADMIN — HEPARIN SODIUM 30000 UNITS: 1000 INJECTION, SOLUTION INTRAVENOUS; SUBCUTANEOUS at 15:22

## 2019-07-09 RX ADMIN — SODIUM CHLORIDE 5 MG/HR: 9 INJECTION, SOLUTION INTRAVENOUS at 17:13

## 2019-07-09 RX ADMIN — FENTANYL CITRATE 100 MCG: 50 INJECTION, SOLUTION INTRAMUSCULAR; INTRAVENOUS at 14:07

## 2019-07-09 RX ADMIN — DEXAMETHASONE SODIUM PHOSPHATE 4 MG: 4 INJECTION, SOLUTION INTRAMUSCULAR; INTRAVENOUS at 14:07

## 2019-07-09 RX ADMIN — CEFAZOLIN SODIUM 2 G: 1 SOLUTION INTRAVENOUS at 14:27

## 2019-07-09 RX ADMIN — VASOPRESSIN 0.02 UNITS/MIN: 20 INJECTION INTRAVENOUS at 15:58

## 2019-07-09 RX ADMIN — SODIUM CHLORIDE, SODIUM GLUCONATE, SODIUM ACETATE, POTASSIUM CHLORIDE AND MAGNESIUM CHLORIDE 500 ML: 526; 502; 368; 37; 30 INJECTION, SOLUTION INTRAVENOUS at 17:59

## 2019-07-09 RX ADMIN — SODIUM BICARBONATE 50 MEQ: 84 INJECTION, SOLUTION INTRAVENOUS at 15:55

## 2019-07-09 RX ADMIN — ROCURONIUM BROMIDE 30 MG: 10 INJECTION, SOLUTION INTRAVENOUS at 19:30

## 2019-07-09 RX ADMIN — PROPOFOL 100 MG: 10 INJECTION, EMULSION INTRAVENOUS at 14:11

## 2019-07-09 RX ADMIN — PROTAMINE SULFATE 250 MG: 10 INJECTION, SOLUTION INTRAVENOUS at 17:02

## 2019-07-09 RX ADMIN — INSULIN HUMAN 4 UNITS/HR: 100 INJECTION, SOLUTION PARENTERAL at 17:09

## 2019-07-09 RX ADMIN — PROPOFOL 100 MG: 10 INJECTION, EMULSION INTRAVENOUS at 19:30

## 2019-07-09 RX ADMIN — PROTAMINE SULFATE 50 MG: 10 INJECTION, SOLUTION INTRAVENOUS at 17:38

## 2019-07-09 RX ADMIN — HEPARIN SODIUM 20000 UNITS: 1000 INJECTION, SOLUTION INTRAVENOUS; SUBCUTANEOUS at 15:51

## 2019-07-09 RX ADMIN — SODIUM BICARBONATE 50 MEQ: 84 INJECTION, SOLUTION INTRAVENOUS at 15:51

## 2019-07-09 NOTE — OP NOTE
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  CPT(R) Code:  42813 - MD REMOVAL HEART LESION INTERN  Mini  sternotomy    Anesthesia: General    Anesthesiologist: Anesthesiologist: Lucas Herrera MD       Operative Findings:  Fibroelastoma of the cord of the lateral papillary muscle    Procedure Details     Saad Hillman is a 74 y.o. male who was taken to the operating room and was placed in supine position on the operating table. The patient was given general endotracheal anesthesia. A Cruz Adam catheter, arterial line and braden catheter were inserted. The chest, abdomen and lower extremities were prepped and draped in the usual sterile fashion. A minimally invasive sternotomy incision was done in the upper mid-line. The incision was deepened through the subcutaneous tissue and the fascia. A mini-sternotomy incision was done and extended to the right fourth intercostal space. A small retractor was inserted and opened. The pericardium was opened and suspended from the free edges of the  incision. At the same time, a small right groin incision was done. The right common femoral vein was exposed. The patient was fully heparinized and adequate ACT was achieved.  The aorta was cannulated using the Seldinger technique under transesophageal echocardiographic guidance. The right common femoral vein was cannulated using the Seldinger technique and the cannula was advanced in the superior vena cava under transesophageal echocardiographic guidance. The patient was placed on cardiopulmonary bypass and was cooled to 32 degrees Celsius. A pulmonary vent was inserted directly into the pulmonary artery. A cardioplegia/vent cannula was inserted in the ascending aorta. The ascending aorta was cross-clamped and the heart was arrested with cold half-way cardioplegia. Subsequently a transverse incision was done in the dome of the left atrium..    The papillary fibroblastoma was located in the cords of the lateral papillary muscle.  The fibroblastoma was excised.  The left atrium was closed with 4-0 Prolene in a continuous fashion 2 layers.  Volume was left into the heart, which was de-aired through the root vent.    Then, the aortic cross-clamp was removed. The de-airing of the heart was completed under transesophageal echocardiographic guidance.   Pacemaker wires were inserted. When the patient was warmed up, the patient was taken off cardiopulmonary bypass without any difficulties.  With stable hemodynamics and no evidence of residual fibroelastome protamine was given and adequate hemostasis was achieved. The cannulae were removed and the cannulation sites were secured. Chest tubes were inserted. The chest was closed with interrupted stainless steel wires . The fascia was closed with 1-0 Vicryl and the subcutaneous tissue was closed with 2-0 Vicryl and the skin was closed with 3-0 Vicryl in a subcuticular fashion. The groin incision was closed with 2-0 Vicryl and 3-0 Vicryl respectively. The needle, sponge and  instrument count was reported as correct. The patient was transferred to the Intensive Care Unit in stable condition. The first assistant  assisted me in opening and closing the chest, connecting the patient to the heart-lung machine and completing all aspects of the operation. The physician assistant  opened and closed the groin incision. I ascertain that I was present and performed all aspects of the operation and I was available thereafter.         Estimated Blood Loss: 500 cc             Complications: None: the patient tolerated the procedure well           Disposition: CTICU - hemodynamically stable.           Condition: stable    José Mart MD  Phone Number: [phone]

## 2019-07-09 NOTE — ANESTHESIA PROCEDURE NOTES
Procedure Performed: MAYE     Start Time:        End Time:      Preanesthesia Checklist:  Patient identified, IV assessed, risks and benefits discussed, monitors and equipment assessed, procedure being performed at surgeon's request and anesthesia consent obtained.    General Procedure Information  Physician Requesting Echo: JAUN CASTELLANO  Location performed:  OR  Intubated  Bite block placed  Heart visualized  Probe Insertion:  Easy  Probe Type:  Multiplane and transesophageal  Modalities:  Pulse wave Doppler, color flow mapping, 3D and 2D        Anesthesia Information  Performed Personally  Anesthesiologist:  MACIEL CARLOS      Echocardiogram Comments:       Pre:  LV: EF 55%, no RWMA.  RV: normal size and function.  MV: no MR, no MS. There is a 0.7x0.9cm mass attached to the lateral cords that attach to the AML close to the papillary muscle.   AV: no AS, no AI  TV: trace TR  IAS: no PFO by CFD.  Ao: normal caliber, no dissection.  No pericardial or pleural effusions.    Post:   LV: EF 55%, no RWMA.  RV: normal size and function.  MV: no MR, no MS. No mass remains.   AV: no AS, no AI  TV: trace TR  IAS: no PFO by CFD.  Ao: normal caliber, no dissection.  No pericardial or pleural effusions.  Exam unchanged with chest closure.  MAYE probe removed with clean tip.  Discussed with Dr. Castellano.

## 2019-07-09 NOTE — ANESTHESIA PROCEDURE NOTES
Arterial Line:    Start time: 7/9/2019 1:59 PM    An arterial line was placed in the OR for the following indication(s): continuous blood pressure monitoring, blood sampling needed and unable to use non-invasive cuff.    A 20 G (size), (length), Arrow (type) catheter was placed,   Seldinger technique used, into the Right radial artery, secured by Tegaderm.      Procedure performed using ultrasound guidance.  Image captured and stored.  Image visualization comments: wire in artery    Events:  patient tolerated procedure well with no complications.    The supervising anesthesiologist was   Performed By: anesthesiologist  Attending: MACIEL CARLOS

## 2019-07-09 NOTE — ANESTHESIA PROCEDURE NOTES
Central Venous Line:      Start time: 7/9/2019 2:20 PM          A cardiac line was placed in the OR for the following indication(s): central venous access and CVP monitoring.      Sterility preparation included the following: provider hand hygiene performed prior to insertion and all 5 sterile barriers used (gloves, gown, cap, mask, large sterile drape) during insertion.      The patient was placed in Trendelenburg position. Right internal jugular vein was prepped.    The site was prepped with Chlorhexidine.  A 9 Fr (size), introducer double lumen was placed.    During the procedure, the following specific steps were taken: target vein identified, needle advanced into vein and blood aspirated and guidewire advanced into vein.  Seldinger technique used      Procedure performed using ultrasound guidance.  Sterile gel and probe cover used in ultrasound-guided central venous catheter insertion.  Image captured and stored.  Image visualization comments: wire in RIJ    pulmonary artery catheterization not placed            Intravenous verification was obtained by ultrasound.      Post insertion care included: all ports aspirated, all ports flushed easily, guidewire removed intact, Biopatch applied, line sutured in place and dressing applied.    During the procedure the patient experienced: patient tolerated procedure well with no complications.                    Staffing  Anesthesiologist: MACIEL CARLOS

## 2019-07-09 NOTE — ANESTHESIA PROCEDURE NOTES
Airway  Urgency: elective    Start Time: 7/9/2019 2:11 PM  Airway not difficult    General Information and Staff    Patient location during procedure: OR  Anesthesiologist: MACIEL CARLOS  Performed: anesthesiologist     Indications and Patient Condition  Indications for airway management: anesthesia  Sedation level: general  Preoxygenated: yes  Patient position: sniffing  Mask difficulty assessment: 2 - vent by mask + OA or adjuvant +/- NMBA    Final Airway Details  Final airway type: endotracheal airway      Successful airway: ETT wEVAC  Cuffed: yes   Successful intubation technique: video laryngoscopy  Facilitating devices/methods: intubating stylet  Endotracheal tube insertion site: oral  Blade size: #3  ETT size: 8.0 mm  Cormack-Lehane Classification: grade I - full view of glottis  Placement verified by: chest auscultation, capnometry and palpation of cuff   Measured from: teeth  ETT to teeth (cm): 23  Number of attempts at approach: 1  Number of other approaches attempted: 0  Atraumatic airway insertion

## 2019-07-09 NOTE — BRIEF OP NOTE
FIBROELASTOMA EXCISION FROM MITRAL VALVE. Procedure Note    Procedure:    FIBROELASTOMA EXCISION FROM MITRAL VALVE.  CPT(R) Code:  00159 - CT REMOVAL HEART LESION INTERN      Pre-op Diagnosis     * Papillary fibroelastoma [D15.1]       Post-op Diagnosis     * Papillary fibroelastoma [D15.1]    Surgeon(s) and Role:     * José Mart MD - Primary    Anesthesia: General    Staff:   Circulator: Bea Soto RN  Physician Assistant: Kaitlin Atkinson PA C  Scrub Person: Holly Anderson RN; Akiko Leach, LOVE; Noreen Arango    Procedure Details   Mini sternotomy and atriotomy for papillary fibroelastoma excision from mitral valve chordae. Given 2 U Plt intraop. Tx to CTICU on dobutamine 2, cardene 2.5, amicar, precedex    Estimated Blood Loss: No blood loss documented.    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.1] Collected By: José Mart MD 7/9/2019  2:27 PM   ANTITHROMBIN III ACTIVITY Blood, Arterial   Pre-op diagnosis:Papillary fibroelastoma [D15.1] Collected By: José Mart MD 7/9/2019  2:27 PM   FIBRINOGEN Blood, Arterial   Pre-op diagnosis:Papillary fibroelastoma [D15.1] Collected By: José Mart MD 7/9/2019  2:27 PM   CBC Blood, Arterial   Pre-op diagnosis:Papillary fibroelastoma [D15.1] Collected By: José Mrat MD 7/9/2019  4:27 PM   FIBRINOGEN Blood, Arterial   Pre-op diagnosis:Papillary fibroelastoma [D15.1] Collected By: José Mart MD 7/9/2019  4:27 PM   CBC Blood, Arterial   Pre-op diagnosis:Papillary fibroelastoma [D15.1] Collected By: José Mart MD 7/9/2019  5:18 PM   FIBRINOGEN Blood, Arterial   Pre-op diagnosis:Papillary fibroelastoma [D15.1] Collected By: José Mart MD 7/9/2019  5:18  PM   PROTIME-INR Blood, Arterial   Pre-op diagnosis:Papillary fibroelastoma [D15.1] Collected By: José Mart MD 7/9/2019  5:18 PM   PTT Blood, Arterial   Pre-op diagnosis:Papillary fibroelastoma [D15.1] Collected By: José Mart MD 7/9/2019  5:18 PM   PREPARE SINGLE DONOR PLATELETS    PreOp for Procedure  7/9/2019 12:33 PM    Transfusion indications  Pre-Op, major surgery with bleeding risk       Has consent been obtained?  Yes      PREPARE RBC    Pre-op for Procedure  7/9/2019 12:33 PM    Transfusion indications  Pre-OP major surgery with bleeding risk       Has consent been obtained?  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           Disposition: ICU - intubated and hemodynamically stable.           Condition: stable    José Mart MD  Phone Number: [phone]

## 2019-07-09 NOTE — ANESTHESIA POSTPROCEDURE EVALUATION
Patient: Saad Hillman    Procedure Summary     Date:  07/09/19 Room / Location:  [facility] OR 4 / [facility] OR    Anesthesia Start:  1348 Anesthesia Stop:  1939    Procedure:  FIBROELASTOMA EXCISION FROM MITRAL VALVE. (N/A Chest) Diagnosis:       Papillary fibroelastoma      (Papillary fibroelastoma [D15.1])    Surgeon:  José Mart MD Responsible Provider:  Lucas Herrera MD    Anesthesia Type:  general ASA Status:  3          Anesthesia Type: general  PACU Vitals  7/9/2019 1911 - 7/9/2019 1939 7/9/2019 1922 7/9/2019 1923 7/9/2019 1924 7/9/2019 1925    Arterial Line BP: (!)  160/19 112/43 113/42 115/43    Pulse: - 74 74 72    Resp: - (!)  24 18 (!)  23    SpO2: - - - 100 %              7/9/2019 1929 7/9/2019 1930 7/9/2019 1935       Arterial Line BP: 107/43 108/43 114/48     Pulse: 71 71 72     Resp: (!)  26 (!)  0 (!)  23     SpO2: 100 % 100 % 100 %             Anesthesia Post Evaluation    Mode of pain management: IV medication  Patient location during evaluation: ICU  Patient participation: complete - patient cannot participate  Cardiovascular status: acceptable and hemodynamically stable  Airway Patency: adequate  Respiratory status: acceptable, ETT, intubated and ventilator  Hydration status: stable  Anesthetic complications: no  Comments: Reviewed ABG with ICU team.  Changes discussed and made accordingly.  All questions addressed.

## 2019-07-10 ENCOUNTER — APPOINTMENT (INPATIENT)
Dept: RADIOLOGY | Facility: HOSPITAL | Age: 74
DRG: 229 | End: 2019-07-10
Attending: PHYSICIAN ASSISTANT
Payer: COMMERCIAL

## 2019-07-10 ENCOUNTER — APPOINTMENT (INPATIENT)
Dept: RADIOLOGY | Facility: HOSPITAL | Age: 74
DRG: 229 | End: 2019-07-10
Attending: THORACIC SURGERY (CARDIOTHORACIC VASCULAR SURGERY)
Payer: COMMERCIAL

## 2019-07-10 PROBLEM — R00.1 JUNCTIONAL BRADYCARDIA: Status: ACTIVE | Noted: 2019-07-10

## 2019-07-10 LAB
ANION GAP SERPL CALC-SCNC: 9 MEQ/L (ref 3–15)
AORTIC VALVE MEAN VELOCITY: 0.59 M/S
AORTIC VALVE VELOCITY TIME INTEGRAL: 18.2 CM
ATRIAL RATE: 234
ATRIAL RATE: 58
ATRIAL RATE: 68
ATRIAL RATE: 70
ATRIAL RATE: 72
AV MEAN GRADIENT: 2 MMHG
AV PEAK GRADIENT: 3 MMHG
AV PEAK VELOCITY-S: 0.93 M/S
AV VALVE AREA: 2.85 CM2
AV VALVE AREA: 2.93 CM2
BASE EXCESS BLDA CALC-SCNC: -0.1 MEQ/L
BASE EXCESS BLDA CALC-SCNC: -0.4 MEQ/L
BASE EXCESS BLDA CALC-SCNC: -0.5 MEQ/L
BASE EXCESS BLDA CALC-SCNC: -0.6 MEQ/L
BASE EXCESS BLDA CALC-SCNC: 0.7 MEQ/L
BASE EXCESS BLDA CALC-SCNC: 0.7 MEQ/L
BASE EXCESS BLDA CALC-SCNC: 1.3 MEQ/L
BASE EXCESS BLDA CALC-SCNC: 1.5 MEQ/L
BASE EXCESS BLDA CALC-SCNC: 1.5 MEQ/L
BSA FOR ECHO PROCEDURE: 1.97 M2
BUN SERPL-MCNC: 13 MG/DL (ref 8–20)
CA-I BLD-SCNC: 1.18 MMOL/L (ref 1.15–1.27)
CA-I BLD-SCNC: 1.19 MMOL/L (ref 1.15–1.27)
CA-I BLD-SCNC: 1.2 MMOL/L (ref 1.15–1.27)
CA-I BLD-SCNC: 1.21 MMOL/L (ref 1.15–1.27)
CA-I BLD-SCNC: 1.22 MMOL/L (ref 1.15–1.27)
CA-I BLD-SCNC: 1.23 MMOL/L (ref 1.15–1.27)
CA-I BLD-SCNC: 1.23 MMOL/L (ref 1.15–1.27)
CA-I BLD-SCNC: 1.24 MMOL/L (ref 1.15–1.27)
CA-I BLD-SCNC: 1.25 MMOL/L (ref 1.15–1.27)
CA-I BLD-SCNC: 1.26 MMOL/L (ref 1.15–1.27)
CA-I BLD-SCNC: 1.26 MMOL/L (ref 1.15–1.27)
CALCIUM SERPL-MCNC: 8.9 MG/DL (ref 8.9–10.3)
CHLORIDE SERPL-SCNC: 106 MEQ/L (ref 98–109)
CO2 BLDA-SCNC: 23 MEQ/L (ref 22–32)
CO2 BLDA-SCNC: 24 MEQ/L (ref 22–32)
CO2 BLDA-SCNC: 24 MEQ/L (ref 22–32)
CO2 BLDA-SCNC: 25 MEQ/L (ref 22–32)
CO2 BLDA-SCNC: 26 MEQ/L (ref 22–32)
CO2 BLDA-SCNC: 26 MEQ/L (ref 22–32)
CO2 BLDA-SCNC: 27 MEQ/L (ref 22–32)
CO2 BLDA-SCNC: 28 MEQ/L (ref 22–32)
CO2 SERPL-SCNC: 23 MEQ/L (ref 22–32)
CREAT SERPL-MCNC: 0.8 MG/DL
DOP CALC LVOT STROKE VOLUME: 51.91 ML
E/A RATIO: 1.1
E/LAT E' RATIO: 3.8
ERYTHROCYTE [DISTWIDTH] IN BLOOD BY AUTOMATED COUNT: 12.6 % (ref 11.6–14.4)
GFR SERPL CREATININE-BSD FRML MDRD: >60 ML/MIN/1.73M*2
GLUCOSE BLD-MCNC: 100 MG/DL (ref 70–99)
GLUCOSE BLD-MCNC: 102 MG/DL (ref 70–99)
GLUCOSE BLD-MCNC: 115 MG/DL (ref 70–99)
GLUCOSE BLDA-MCNC: 125 MG/DL (ref 65–95)
GLUCOSE BLDA-MCNC: 129 MG/DL (ref 65–95)
GLUCOSE BLDA-MCNC: 130 MG/DL (ref 65–95)
GLUCOSE BLDA-MCNC: 132 MG/DL (ref 65–95)
GLUCOSE BLDA-MCNC: 133 MG/DL (ref 65–95)
GLUCOSE BLDA-MCNC: 133 MG/DL (ref 65–95)
GLUCOSE BLDA-MCNC: 137 MG/DL (ref 65–95)
GLUCOSE BLDA-MCNC: 142 MG/DL (ref 65–95)
GLUCOSE BLDA-MCNC: 142 MG/DL (ref 65–95)
GLUCOSE BLDA-MCNC: 144 MG/DL (ref 65–95)
GLUCOSE BLDA-MCNC: 152 MG/DL (ref 65–95)
GLUCOSE BLDA-MCNC: 157 MG/DL (ref 65–95)
GLUCOSE BLDA-MCNC: 180 MG/DL (ref 65–95)
GLUCOSE SERPL-MCNC: 146 MG/DL (ref 70–99)
HCO3 BLDA-SCNC: 22 MEQ/L (ref 21–28)
HCO3 BLDA-SCNC: 23 MEQ/L (ref 21–28)
HCO3 BLDA-SCNC: 23 MEQ/L (ref 21–28)
HCO3 BLDA-SCNC: 24 MEQ/L (ref 21–28)
HCO3 BLDA-SCNC: 25 MEQ/L (ref 21–28)
HCO3 BLDA-SCNC: 26 MEQ/L (ref 21–28)
HCO3 BLDA-SCNC: 26 MEQ/L (ref 21–28)
HCO3 BLDA-SCNC: 27 MEQ/L (ref 21–28)
HCT VFR BLDA CALC: 33 % (ref 36–48)
HCT VFR BLDA CALC: 34 % (ref 36–48)
HCT VFR BLDA CALC: 35 % (ref 36–48)
HCT VFR BLDA CALC: 35 % (ref 36–48)
HCT VFR BLDA CALC: 36 % (ref 36–48)
HCT VFR BLDA CALC: 37 % (ref 36–48)
HCT VFR BLDA CALC: 38 % (ref 36–48)
HCT VFR BLDCO AUTO: 32.7 %
HGB BLD-MCNC: 10.9 G/DL
LACTATE BLDA-SCNC: 0.9 MMOL/L (ref 0.4–1.6)
LACTATE BLDA-SCNC: 1 MMOL/L (ref 0.4–1.6)
LACTATE BLDA-SCNC: 1.2 MMOL/L (ref 0.4–1.6)
LACTATE BLDA-SCNC: 1.4 MMOL/L (ref 0.4–1.6)
LACTATE BLDA-SCNC: 1.7 MMOL/L (ref 0.4–1.6)
LACTATE BLDA-SCNC: 2.1 MMOL/L (ref 0.4–1.6)
LVOT 2D: 2.3 CM
LVOT A: 4.15 CM2
LVOT MG: 1 MMHG
LVOT MV: 0.45 M/S
LVOT PEAK VELOCITY: 0.66 M/S
LVOT PG: 2 MMHG
LVOT VTI: 12.5 CM
MAGNESIUM SERPL-MCNC: 2 MG/DL (ref 1.8–2.5)
MAGNESIUM SERPL-MCNC: 2.2 MG/DL (ref 1.8–2.5)
MAGNESIUM SERPL-MCNC: 2.3 MG/DL (ref 1.8–2.5)
MAGNESIUM SERPL-MCNC: 2.6 MG/DL (ref 1.8–2.5)
MAGNESIUM SERPL-MCNC: 2.6 MG/DL (ref 1.8–2.5)
MCH RBC QN AUTO: 30.4 PG (ref 28–33.2)
MCHC RBC AUTO-ENTMCNC: 33.3 G/DL (ref 32.2–36.5)
MCV RBC AUTO: 91.3 FL (ref 83–98)
MRSA DNA SPEC QL NAA+PROBE: NEGATIVE
MV E'TISSUE VEL-LAT: 0.11 M/S
MV MEAN GRADIENT: 1 MMHG
MV PEAK A VEL: 0.36 M/S
MV PEAK E VEL: 0.4 M/S
MV PEAK GRADIENT: 1 MMHG
MV VALVE AREA BY CONTINUITY EQUATION: 3.44 CM2
MV VTI: 15.1 CM
P AXIS: 70
P AXIS: 79
PCO2 BLDA: 30 MM HG (ref 35–48)
PCO2 BLDA: 31 MM HG (ref 35–48)
PCO2 BLDA: 32 MM HG (ref 35–48)
PCO2 BLDA: 34 MM HG (ref 35–48)
PCO2 BLDA: 35 MM HG (ref 35–48)
PCO2 BLDA: 39 MM HG (ref 35–48)
PCO2 BLDA: 40 MM HG (ref 35–48)
PCO2 BLDA: 42 MM HG (ref 35–48)
PCO2 BLDA: 43 MM HG (ref 35–48)
PCO2 BLDA: 44 MM HG (ref 35–48)
PCO2 BLDA: 47 MM HG (ref 35–48)
PDW BLD AUTO: 9.7 FL (ref 9.4–12.4)
PH BLDA: 7.36 PH (ref 7.35–7.45)
PH BLDA: 7.37 PH (ref 7.35–7.45)
PH BLDA: 7.37 PH (ref 7.35–7.45)
PH BLDA: 7.38 PH (ref 7.35–7.45)
PH BLDA: 7.42 PH (ref 7.35–7.45)
PH BLDA: 7.43 PH (ref 7.35–7.45)
PH BLDA: 7.44 PH (ref 7.35–7.45)
PH BLDA: 7.45 PH (ref 7.35–7.45)
PH BLDA: 7.46 PH (ref 7.35–7.45)
PH BLDA: 7.46 PH (ref 7.35–7.45)
PH BLDA: 7.48 PH (ref 7.35–7.45)
PH BLDA: 7.48 PH (ref 7.35–7.45)
PH BLDA: 7.5 PH (ref 7.35–7.45)
PHOSPHATE SERPL-MCNC: 3 MG/DL (ref 2.4–4.7)
PLATELET # BLD AUTO: 230 K/UL
PO2 BLDA: 112 MM HG (ref 83–100)
PO2 BLDA: 112 MM HG (ref 83–100)
PO2 BLDA: 123 MM HG (ref 83–100)
PO2 BLDA: 124 MM HG (ref 83–100)
PO2 BLDA: 135 MM HG (ref 83–100)
PO2 BLDA: 155 MM HG (ref 83–100)
PO2 BLDA: 63 MM HG (ref 83–100)
PO2 BLDA: 69 MM HG (ref 83–100)
PO2 BLDA: 76 MM HG (ref 83–100)
PO2 BLDA: 76 MM HG (ref 83–100)
PO2 BLDA: 80 MM HG (ref 83–100)
PO2 BLDA: 91 MM HG (ref 83–100)
PO2 BLDA: 94 MM HG (ref 83–100)
POCT ACT-HR: 122 SEC (ref 100–140)
POCT ACT-HR: 425 SEC (ref 100–140)
POCT ACT-HR: 498 SEC (ref 100–140)
POCT HEP TEST CONC: 3 MG/KG
POCT PATIENT TEMPERATURE: 98.6 °F (ref 97–99)
POCT PROTAMINE DOSE: 286 MG
POCT PROTOCOL HEP CONC: 3.1 MG/KG
POCT TEST (BLD GAS): ABNORMAL
POCT TEST: ABNORMAL
POCT TEST: NORMAL
POCT TOTAL HEPARIN REQD: 1366 UNITS
POTASSIUM BLDA-SCNC: 3.4 MEQ/L (ref 3.4–4.5)
POTASSIUM BLDA-SCNC: 3.4 MEQ/L (ref 3.4–4.5)
POTASSIUM BLDA-SCNC: 3.8 MEQ/L (ref 3.4–4.5)
POTASSIUM BLDA-SCNC: 3.9 MEQ/L (ref 3.4–4.5)
POTASSIUM BLDA-SCNC: 3.9 MEQ/L (ref 3.4–4.5)
POTASSIUM BLDA-SCNC: 4 MEQ/L (ref 3.4–4.5)
POTASSIUM BLDA-SCNC: 4.1 MEQ/L (ref 3.4–4.5)
POTASSIUM BLDA-SCNC: 4.2 MEQ/L (ref 3.4–4.5)
POTASSIUM BLDA-SCNC: 4.3 MEQ/L (ref 3.4–4.5)
POTASSIUM BLDA-SCNC: 4.4 MEQ/L (ref 3.4–4.5)
POTASSIUM SERPL-SCNC: 3.7 MEQ/L (ref 3.6–5.1)
POTASSIUM SERPL-SCNC: 3.9 MEQ/L (ref 3.6–5.1)
POTASSIUM SERPL-SCNC: 4.1 MEQ/L (ref 3.6–5.1)
POTASSIUM SERPL-SCNC: 4.2 MEQ/L (ref 3.6–5.1)
POTASSIUM SERPL-SCNC: 5.5 MEQ/L (ref 3.6–5.1)
PR INTERVAL: 134
PR INTERVAL: 156
PULM VEIN S/D RATIO: 0.9
PV PEAK D VEL: 0.39 M/S
PV PEAK S VEL: 0.36 M/S
QRS DURATION: 78
QRS DURATION: 78
QRS DURATION: 88
QRS DURATION: 92
QRS DURATION: 92
QT INTERVAL: 370
QT INTERVAL: 378
QT INTERVAL: 382
QT INTERVAL: 398
QT INTERVAL: 410
QTC CALCULATION(BAZETT): 378
QTC CALCULATION(BAZETT): 401
QTC CALCULATION(BAZETT): 413
QTC CALCULATION(BAZETT): 423
QTC CALCULATION(BAZETT): 448
R AXIS: 48
R AXIS: 54
R AXIS: 54
R AXIS: 55
R AXIS: 76
RBC # BLD AUTO: 3.58 M/UL (ref 4.5–5.8)
SAO2 % BLDA: 91 % (ref 93–98)
SAO2 % BLDA: 95 % (ref 93–98)
SAO2 % BLDA: 96 % (ref 93–98)
SAO2 % BLDA: 97 % (ref 93–98)
SAO2 % BLDA: 98 % (ref 93–98)
SAO2 % BLDA: 98 % (ref 93–98)
SAO2 % BLDA: 99 % (ref 93–98)
SODIUM SERPL-SCNC: 138 MEQ/L (ref 136–144)
T WAVE AXIS: 67
T WAVE AXIS: 69
T WAVE AXIS: 73
T WAVE AXIS: 77
T WAVE AXIS: 78
VENTRICULAR RATE: 59
VENTRICULAR RATE: 68
VENTRICULAR RATE: 68
VENTRICULAR RATE: 72
VENTRICULAR RATE: 75
WBC # BLD AUTO: 10.11 K/UL

## 2019-07-10 PROCEDURE — 63700000 HC SELF-ADMINISTRABLE DRUG: Performed by: NURSE PRACTITIONER

## 2019-07-10 PROCEDURE — 20000000 HC ROOM AND CARE ICU

## 2019-07-10 PROCEDURE — 99291 CRITICAL CARE FIRST HOUR: CPT | Performed by: ANESTHESIOLOGY

## 2019-07-10 PROCEDURE — 84100 ASSAY OF PHOSPHORUS: CPT | Performed by: PHYSICIAN ASSISTANT

## 2019-07-10 PROCEDURE — 71045 X-RAY EXAM CHEST 1 VIEW: CPT

## 2019-07-10 PROCEDURE — 93010 ELECTROCARDIOGRAM REPORT: CPT | Mod: 77 | Performed by: INTERNAL MEDICINE

## 2019-07-10 PROCEDURE — 94640 AIRWAY INHALATION TREATMENT: CPT

## 2019-07-10 PROCEDURE — 97162 PT EVAL MOD COMPLEX 30 MIN: CPT | Mod: GP

## 2019-07-10 PROCEDURE — 36415 COLL VENOUS BLD VENIPUNCTURE: CPT | Performed by: PHYSICIAN ASSISTANT

## 2019-07-10 PROCEDURE — 93010 ELECTROCARDIOGRAM REPORT: CPT | Mod: 76,77 | Performed by: INTERNAL MEDICINE

## 2019-07-10 PROCEDURE — 93005 ELECTROCARDIOGRAM TRACING: CPT | Performed by: THORACIC SURGERY (CARDIOTHORACIC VASCULAR SURGERY)

## 2019-07-10 PROCEDURE — 83735 ASSAY OF MAGNESIUM: CPT | Performed by: THORACIC SURGERY (CARDIOTHORACIC VASCULAR SURGERY)

## 2019-07-10 PROCEDURE — 93005 ELECTROCARDIOGRAM TRACING: CPT | Performed by: PHYSICIAN ASSISTANT

## 2019-07-10 PROCEDURE — 97116 GAIT TRAINING THERAPY: CPT | Mod: GP

## 2019-07-10 PROCEDURE — 63700000 HC SELF-ADMINISTRABLE DRUG: Performed by: PHYSICIAN ASSISTANT

## 2019-07-10 PROCEDURE — 63600000 HC DRUGS/DETAIL CODE: Performed by: PHYSICIAN ASSISTANT

## 2019-07-10 PROCEDURE — 80048 BASIC METABOLIC PNL TOTAL CA: CPT | Performed by: PHYSICIAN ASSISTANT

## 2019-07-10 PROCEDURE — 84132 ASSAY OF SERUM POTASSIUM: CPT | Performed by: PHYSICIAN ASSISTANT

## 2019-07-10 PROCEDURE — 85027 COMPLETE CBC AUTOMATED: CPT | Performed by: PHYSICIAN ASSISTANT

## 2019-07-10 PROCEDURE — 83735 ASSAY OF MAGNESIUM: CPT | Performed by: PHYSICIAN ASSISTANT

## 2019-07-10 PROCEDURE — 63600000 HC DRUGS/DETAIL CODE: Performed by: NURSE PRACTITIONER

## 2019-07-10 PROCEDURE — P9045 ALBUMIN (HUMAN), 5%, 250 ML: HCPCS | Performed by: PHYSICIAN ASSISTANT

## 2019-07-10 PROCEDURE — 99222 1ST HOSP IP/OBS MODERATE 55: CPT | Performed by: INTERNAL MEDICINE

## 2019-07-10 RX ORDER — TAMSULOSIN HYDROCHLORIDE 0.4 MG/1
0.4 CAPSULE ORAL DAILY
Status: DISCONTINUED | OUTPATIENT
Start: 2019-07-10 | End: 2019-07-15 | Stop reason: HOSPADM

## 2019-07-10 RX ORDER — NOREPINEPHRINE BITARTRATE 0.02 MG/ML
2-8 INJECTION, SOLUTION INTRAVENOUS
Status: DISCONTINUED | OUTPATIENT
Start: 2019-07-10 | End: 2019-07-13

## 2019-07-10 RX ORDER — ENOXAPARIN SODIUM 100 MG/ML
40 INJECTION SUBCUTANEOUS
Status: DISCONTINUED | OUTPATIENT
Start: 2019-07-10 | End: 2019-07-15 | Stop reason: HOSPADM

## 2019-07-10 RX ORDER — METOCLOPRAMIDE HYDROCHLORIDE 5 MG/ML
10 INJECTION INTRAMUSCULAR; INTRAVENOUS ONCE
Status: COMPLETED | OUTPATIENT
Start: 2019-07-10 | End: 2019-07-10

## 2019-07-10 RX ORDER — SODIUM CHLORIDE 9 MG/ML
INJECTION, SOLUTION INTRAVENOUS CONTINUOUS
Status: DISCONTINUED | OUTPATIENT
Start: 2019-07-10 | End: 2019-07-13

## 2019-07-10 RX ORDER — ACETAMINOPHEN 325 MG/1
975 TABLET ORAL EVERY 6 HOURS
Status: DISCONTINUED | OUTPATIENT
Start: 2019-07-10 | End: 2019-07-15 | Stop reason: HOSPADM

## 2019-07-10 RX ADMIN — HYDROMORPHONE HYDROCHLORIDE 0.5 MG: 1 INJECTION, SOLUTION INTRAMUSCULAR; INTRAVENOUS; SUBCUTANEOUS at 03:02

## 2019-07-10 RX ADMIN — ROSUVASTATIN CALCIUM 10 MG: 10 TABLET, FILM COATED ORAL at 20:05

## 2019-07-10 RX ADMIN — COLCHICINE 0.6 MG: 0.6 TABLET, FILM COATED ORAL at 08:49

## 2019-07-10 RX ADMIN — ACETAMINOPHEN 975 MG: 325 TABLET, FILM COATED ORAL at 18:08

## 2019-07-10 RX ADMIN — ALBUMIN (HUMAN) 250 ML: 12.5 SOLUTION INTRAVENOUS at 11:00

## 2019-07-10 RX ADMIN — POTASSIUM CHLORIDE 20 MEQ: 750 TABLET, FILM COATED, EXTENDED RELEASE ORAL at 20:05

## 2019-07-10 RX ADMIN — POTASSIUM CHLORIDE 20 MEQ: 14.9 INJECTION, SOLUTION INTRAVENOUS at 00:55

## 2019-07-10 RX ADMIN — FAMOTIDINE 20 MG: 20 TABLET ORAL at 20:05

## 2019-07-10 RX ADMIN — Medication 400 MG: at 20:05

## 2019-07-10 RX ADMIN — Medication 400 MG: at 08:50

## 2019-07-10 RX ADMIN — METOCLOPRAMIDE 10 MG: 5 INJECTION, SOLUTION INTRAMUSCULAR; INTRAVENOUS at 08:20

## 2019-07-10 RX ADMIN — FLUTICASONE FUROATE AND VILANTEROL TRIFENATATE 1 PUFF: 100; 25 POWDER RESPIRATORY (INHALATION) at 08:06

## 2019-07-10 RX ADMIN — DOCUSATE SODIUM 100 MG: 100 CAPSULE, LIQUID FILLED ORAL at 08:50

## 2019-07-10 RX ADMIN — FAMOTIDINE 20 MG: 20 TABLET ORAL at 08:50

## 2019-07-10 RX ADMIN — TAMSULOSIN HYDROCHLORIDE 0.4 MG: 0.4 CAPSULE ORAL at 10:38

## 2019-07-10 RX ADMIN — ONDANSETRON HYDROCHLORIDE 4 MG: 2 SOLUTION INTRAMUSCULAR; INTRAVENOUS at 00:25

## 2019-07-10 RX ADMIN — CHLORHEXIDINE GLUCONATE 15 ML: 1.2 RINSE ORAL at 08:58

## 2019-07-10 RX ADMIN — ENOXAPARIN SODIUM 40 MG: 100 INJECTION SUBCUTANEOUS at 18:08

## 2019-07-10 RX ADMIN — ALBUMIN (HUMAN) 500 ML: 12.5 SOLUTION INTRAVENOUS at 00:34

## 2019-07-10 RX ADMIN — MEPERIDINE HYDROCHLORIDE 12.5 MG: 25 INJECTION INTRAMUSCULAR; INTRAVENOUS; SUBCUTANEOUS at 01:06

## 2019-07-10 RX ADMIN — HYDROMORPHONE HYDROCHLORIDE 0.5 MG: 1 INJECTION, SOLUTION INTRAMUSCULAR; INTRAVENOUS; SUBCUTANEOUS at 05:28

## 2019-07-10 RX ADMIN — CEFAZOLIN 2 G: 10 INJECTION, POWDER, FOR SOLUTION INTRAVENOUS at 10:37

## 2019-07-10 RX ADMIN — ALBUMIN (HUMAN) 250 ML: 12.5 SOLUTION INTRAVENOUS at 16:27

## 2019-07-10 RX ADMIN — KETOROLAC TROMETHAMINE 15 MG: 15 INJECTION, SOLUTION INTRAMUSCULAR; INTRAVENOUS at 10:04

## 2019-07-10 RX ADMIN — KETOROLAC TROMETHAMINE 15 MG: 15 INJECTION, SOLUTION INTRAMUSCULAR; INTRAVENOUS at 00:38

## 2019-07-10 RX ADMIN — MAGNESIUM SULFATE 2 G: 2 INJECTION INTRAVENOUS at 00:54

## 2019-07-10 RX ADMIN — MONTELUKAST SODIUM 10 MG: 10 TABLET, FILM COATED ORAL at 20:04

## 2019-07-10 RX ADMIN — HYDROMORPHONE HYDROCHLORIDE 0.5 MG: 1 INJECTION, SOLUTION INTRAMUSCULAR; INTRAVENOUS; SUBCUTANEOUS at 22:45

## 2019-07-10 RX ADMIN — FUROSEMIDE 40 MG: 40 TABLET ORAL at 08:50

## 2019-07-10 RX ADMIN — ACETAMINOPHEN 975 MG: 325 TABLET, FILM COATED ORAL at 12:09

## 2019-07-10 RX ADMIN — DOBUTAMINE IN DEXTROSE 3 MCG/KG/MIN: 200 INJECTION, SOLUTION INTRAVENOUS at 16:31

## 2019-07-10 RX ADMIN — KETOROLAC TROMETHAMINE 15 MG: 15 INJECTION, SOLUTION INTRAMUSCULAR; INTRAVENOUS at 18:08

## 2019-07-10 RX ADMIN — CHLORHEXIDINE GLUCONATE 15 ML: 1.2 RINSE ORAL at 20:05

## 2019-07-10 RX ADMIN — DOCUSATE SODIUM 100 MG: 100 CAPSULE, LIQUID FILLED ORAL at 20:05

## 2019-07-10 RX ADMIN — CEFAZOLIN 2 G: 10 INJECTION, POWDER, FOR SOLUTION INTRAVENOUS at 01:47

## 2019-07-10 RX ADMIN — POTASSIUM CHLORIDE 20 MEQ: 750 TABLET, FILM COATED, EXTENDED RELEASE ORAL at 08:50

## 2019-07-10 RX ADMIN — ALBUMIN (HUMAN) 250 ML: 12.5 SOLUTION INTRAVENOUS at 18:00

## 2019-07-10 ASSESSMENT — COGNITIVE AND FUNCTIONAL STATUS - GENERAL
MOVING TO AND FROM BED TO CHAIR: 3 - A LITTLE
STANDING UP FROM CHAIR USING ARMS: 3 - A LITTLE
CLIMB 3 TO 5 STEPS WITH RAILING: 3 - A LITTLE
WALKING IN HOSPITAL ROOM: 3 - A LITTLE

## 2019-07-10 NOTE — ASSESSMENT & PLAN NOTE
As an outpatient, he was on Singulair and inhalers.  His breathing is currently unlabored and there is no wheezing.  He is ambulating on room air with no shortness of breath.

## 2019-07-10 NOTE — PROGRESS NOTES
Patient: Saad Hillman  Location: [address]  MRN: [id number]  Today's date: 7/10/2019  Post session, pt sitting in chair, w/call bell accessible. Nsg notified.      Hospital Course  Michael is a 74 y.o. 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    Michael has a past medical history of Arthritis; Asthma; Atrial myxoma; BPH (benign prostatic hyperplasia); Hypertension; Lipid disorder; Medial meniscus tear; Seasonal allergies; and Traumatic amputation of finger (2015).     Admit for papillary fibroelastoma removal> +sternotomy           Therapy Pain/Vitals     Row Name 07/10/19 1518 07/10/19 1537       Pain/Comfort/Sleep    Presence of Pain denies  --    Pain Rating (0-10): Rest 0  --    Pain Rating (0-10): Activity 0  --       Vital Signs    Pulse 66 72    SpO2 98 % 100 %    O2 Delivery Method High flow nasal cannula High flow nasal cannula    O2 Flow Rate (L/min) 10 L/min 15 L/min    BP (!)  106/53 (!)  134/54          Prior Living Environment      Most Recent Value   Lives With  spouse   Living Arrangements  house   Living Environment Comment  1SH, 1step to enter, tub shower w/grab bar   Equipment Currently Used at Home  none          Prior Level of Function      Most Recent Value   Ambulation  independent   Transferring  independent   Toileting  independent   Bathing  independent   Dressing  independent   Eating  independent   Equipment Currently Used at Home  none   Prior Functional Level Comment  ind w/o AD w/func mob for community distances.                 PT Evaluation - 07/10/19 1516        Session Details    Document Type initial evaluation    Mode of Treatment physical therapy;individual therapy       Time Calculation    Start Time 1516    Stop Time 1539    Time Calculation (min) 23 min       General Information    Patient Profile Reviewed? yes    Existing  Precautions/Restrictions fall;cardiac   +sternotomy    Limitations/Impairments hearing   pt lipreads a majority of the time        Orientation Log    Comment AXOX3       Sensory    Sensory General Assessment --   LT BLE WFL, pt does report chronic BUE/BLE swelling        Range of Motion (ROM)    Comment, General Range of Motion ROM BLE WFL       Manual Muscle Testing (MMT)    Comment 4/5 BLE       Sit to Stand Transfer    [place], Sit to Stand Transfer 1 person assist;minimum assist (75% patient effort)       Stand to Sit Transfer    [place], Stand to Sit Transfer 1 person assist;minimum assist (75% patient effort)       Gait Training    [place], Gait 1 person assist;minimum assist (75% or more patient effort)    Assistive Device walker, front-wheeled    Distance in Feet 150 feet    Gait Pattern Utilized step-through    Comment stable VS w/activity, of note, pt amb on 15 L High Flow, RW for initial ambulation trial        AM-PAC (TM) - Mobility (Current Function)    Turning from your back to your side while in a flat bed without using bedrails? 3 - A Little    Moving from lying on your back to sitting on the side of a flat bed without using bedrails? 3 - A Little    Moving to and from a bed to a chair? 3 - A Little    Standing up from a chair using your arms? 3 - A Little    To walk in a hospital room? 3 - A Little    Climbing 3-5 steps with a railing? 3 - A Little    AM-PAC (TM) Mobility Score 18       PT Clinical Impression    Patient's Goals For Discharge return to all previous roles/activities    Plan For Care Reviewed: Physical Therapy PT plan for care discussed with patient    Rehab Potential/Prognosis good, to achieve stated therapy goals    PT Frequency of Treatment 5-7 times per week    Anticipated Equipment Needs at Discharge none    PT Recommended Discharge Disposition home with assist    Daily Outcome Statement Pt tends to lipread 2* hearing loss. Pt has been on 10 L high flow. For  ambulation trial, pt placed on 15 L.  O2 stable w/activity.  Pt used RW for initial ambulation.  Expect steady progress to amb w/o AD.  Rec home w/assistance.                          Education provided this session. See the Patient Education summary report for full details.    PT Care Plan Goals      Most Recent Value   Stair Goal, PT   PT STG: Stairs  supervision required   PT STG: Number of Stairs  2      PT Care Plan Goals      Most Recent Value   Bed Mobility Goal   Date Goal Established: Bed Mobility  07/10/19   Time to Achieve Goal: Bed Mobility  5 - 7 days   Goal Activity: Bed Mobility  all bed mobility activities   Level of [place] Goal: Bed Mobility  independent   Gait Goal   Date Goal Established: Gait Training  07/10/19   Time to Achieve Goal: Gait Training  5 - 7 days   Level of [place]  independent   Distance Goal: Gait Training (feet)  250 feet   Goal: Gait Training  amb w/least AD    Transfer Goal   Date Goal Established: Transfer Training  07/10/19   Time to Achieve Goal: Transfer Training  5 - 7 days   Goal Activity: Transfer Training  bed to chair/chair to bed, sit to stand/stand to sit   Level of [place] Goal: Transfer Training  independent

## 2019-07-10 NOTE — PROGRESS NOTES
Cardiothoracic Intensivist Progress Note       CARDIOTHORACIC   Post-Operative Day # 1     Subjective     History of Present Illness: Saad Hillman is a 74 y.o. 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                              Review of Systems:                                     Constitutional: no acute distress                                                  Eyes: denies difficulty with vision  Ears, nose, mouth, throat, and face: denies oral discomfort, + [place]                                        Respiratory: denies shortness of breath                                  Cardiovascular: mild chest discomfort at incision site                                  Gastrointestinal: denies abdominal pain                                    Genitourinary: denies difficulty voiding                                      Hematologic: denies bleeding issues                                Musculoskeletal: denies muscle pain                                      Neurological: generalized weakness                                   Integumentary: denies rash    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       Surgical History:   Past Surgical History:   Procedure Laterality Date   • FINGER SURGERY      s/p traumatic amputation- had repaired    • HERNIA REPAIR  2012    left inguinal hernia repair    • LIPOMA RESECTION  1999    right shoulder x 2 and back of neck    • NASAL POLYP EXCISION  1966- 1999     x 4    • TONSILLECTOMY  1948   • TRANSURETHRAL RESECTION OF PROSTATE  2017   • VASECTOMY  1980's       Allergies: Aspirin and Hydrocodone-acetaminophen    Social History:   Social History     Social History   • Marital status:      Spouse name: Umu    • Number of children: 2   • Years of education: N/A     Social History Main Topics   • Smoking status: Never Smoker   • Smokeless tobacco: Never Used   • Alcohol use Yes      Comment: occasional    • Drug use: No   • Sexual activity: Not Asked     Other Topics Concern   • None     Social History Narrative   • None       Family History:   Family History   Problem Relation Age of Onset   • Abdominal Aortic Aneurysm Mother    • Hyperlipidemia Mother    • Stroke Father        Objective     Vital signs in last 24 hours:  Temp:  [36.1 °C (97 °F)] 36.1 °C (97 °F)  Heart Rate:  [58-80] 71  Resp:  [0-33] 28  BP: (104-144)/(49-76) 104/49  FiO2 (%) (Set):  [40 %-100 %] 40 %      Intake/Output Summary (Last 24 hours) at 07/10/19 0948  Last data filed at 07/10/19 0900   Gross per 24 hour   Intake           3608.1 ml   Output             5180 ml   Net          -1571.9 ml     Intake/Output this shift:  I/O this shift:  In: 154.1 [I.V.:154.1]  Out: 95 [Urine:95]    Labs  Lab Results   Component Value Date    WBC 10.11 07/10/2019    HGB 10.9 (L) 07/10/2019    HCT 32.7 (L) 07/10/2019     07/10/2019    ALT 21 06/25/2019    AST 19 06/25/2019     07/10/2019    K 5.5 (H) 07/10/2019     07/10/2019    CREATININE 0.8 07/10/2019    BUN 13 07/10/2019    CO2 23 07/10/2019    MG 2.6 (H) 07/10/2019    PHOS 3.0 07/10/2019    TSH 1.04 07/02/2019    INR 1.3 07/09/2019    HGBA1C 5.4 07/02/2019    PT 15.7 (H) 07/09/2019    PTT 47 (H) 07/09/2019       Full Code    Head/Ear/Nose/Throat:     NCAT.                               Eyes:     EOMI and PERRL.                     Respiratory:     diminished at the bases b/l.               Cardiovascular:     RRR and normal S1, S2.               Gastrointestinal:     + Bowel sounds and non-tender.                 Genitourinary:     braden in place.                    Extremities:     trace edema b/l lower extremities.            Musculoskeletal:      Right index finger amputation.                   Neurological:     follows commands.       Behavior/Emotional:     appropriate and cooperative.                           Lymph:      No adenopathy noted.                               Skin:      Clean, dry and intact.     Examination of the patient performed at the bedside. Rounded with ICU team and discussed management/plan with surgical staff. Medications, radiological studies and labs reviewed and discussed with attending of record, Dr. Palomo Mart.    Cardiothoracic Assessment and Plan:    Neurologic: A&Ox3 and pain controlled.     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 On vasoactive medication for cardiac and vascular support. Hypotension likely related to combination of hypovolemia and myocardial stunning. Will resuscitate, monitor MVO2/CO, wean vasoactive medication and continue to optimize cardiac medications. Continue statin for dyslipidemia.      Respiratory: I personally reviewed the CXR which portrayed b/l pulmonary congestion, small right apical pneumothorax and small lung volumes. Will encourage IS, mobilization and wean O2 PRN. Will monitor pneumothorax for now and intervene if pulmonary status changes; so far asymptomatic. Continue asthma medication.     Genitourinary: D/C braden. Making adequate urine output. Will avoid nephrotoxic medications.     Endocrine: Follow [facility].     Hematologic: No evidence active bleeding. Lovenox for DVT ppx. Acute (expected) blood loss anemia from surgery.      Infectious Disease: No indication for antibiotics at this time.     Fluids, Electrolytes: Electrolytes replaced per protocol. Monitor hyperkalemia for now; no evidence  arrhythmias.     Gastrointestinal: PO as tolerated. GI ppx.     Skin: OOB, PT.      Tubes, lines and drains: Will remove superfluous invasive monitors PRN.    Disposition: Critically ill.  Vasoactive support s/p major invasive cardiac surgery. Continue ICU care.     I personally spent 35 minutes of critical care time with this patient not including procedure time.          John Wyatt, DO  7/10/2019

## 2019-07-10 NOTE — ASSESSMENT & PLAN NOTE
This was manifested by edema in the arms and legs.  He has had resection and is doing well postoperatively.  He is ambulating extended distances and should be able to return home when medically ready.    He has a history of hypertension.  Pressure is good on metoprolol.  He was on hydrochlorothiazide and valsartan as an outpatient.

## 2019-07-10 NOTE — PLAN OF CARE
Problem: Patient Care Overview  Goal: Discharge Needs Assessment  Outcome: Ongoing (interventions implemented as appropriate)  Chart reviewed and discussed in CPR this am. Patient is POD #1 removal of fibroelastoma of mitral valve. Currently on weaning Dobutamine and Levo gtts. O2 at 10 liters on HF. Met with patient and wife. Patient very [place]. Lives with supportive wife who will assist with all needs at home. PTA, independent with all ADLs. Denies DME use. N ever had home care. Has Rx coverage with varied low copays. PCP, pharmacy, and demos verified. Specific d/c needs undetermined at this time. Anticipating d/c to home with home care services. Discussed this plan with patient. Agreeable. Offered preferred provider list- declines. Requests [facility]. Referral placed into [facility]. Will follow to assist with transitions of care.    07/10/19 1324   FL Needs Assessment   Concerns To Be Addressed denies needs/concerns at this time;care coordination/care conferences   Readmission Within The Last 30 Days no previous admission in last 30 days   Provider Choice List(s) Given other (see comments)  (declines)   Anticipated Discharge Disposition home with home health services;home with assistance   Type of Home Care Services nursing   Type of Outpatient Services other (comments)  (denies OP services)   Equipment Needed After Discharge none   Current Discharge Risk other (see comments)  (no risk factotts identified)   Current Health   Anticipated Changes Related to Illness none   Activity/Self Care ROS   Equipment Currently Used at Home none   s am.

## 2019-07-10 NOTE — CONSULTS
Brief Nutrition Note    Recommendations   ADAT to Cardiac    Nutrition Charting Type: Nutrition Brief Assessment    Clinical Course: Patient is a 74 y.o. male who was admitted on 7/9/2019 with a diagnosis of Papillary fibroelastoma [D15.1]  Papillary fibroelastoma of heart [D15.1].     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     Past Surgical History:   Procedure Laterality Date   • FINGER SURGERY      s/p traumatic amputation- had repaired    • HERNIA REPAIR  2012    left inguinal hernia repair    • LIPOMA RESECTION  1999    right shoulder x 2 and back of neck    • NASAL POLYP EXCISION  1966- 1999    x 4    • TONSILLECTOMY  1948   • TRANSURETHRAL RESECTION OF PROSTATE  2017   • VASECTOMY  1980's       General Information  Nutrition Evaluation/Patient Follow-Up: Currently not Nutritionally Nuadavfcdhi-Yejgha-rv in 5-7 days  Reason for Consult: Provider order           Nutrition/Diet History  Patient Reported Diet: general  Appetite Prior to Admission: Lwwcsjhyu-%  Cultural/Voodoo Preferences: None          Physical Appearance  Skin: surgical incision     Nutrition Order Review  Nutrition Order Review: does not meet nutritional requirements  Nutrition Order Review Comments: clear, NCS                 Current Weight  Weight Method: Bed scale  Weight: 84.8 kg (186 lb 15.2 oz)       Body Mass Index (BMI)  BMI (Calculated): 27.6  BMI Assessment: BMI 25-29.9: overweight         Lab Results  Lab Results Reviewed: reviewed, pertinent   BMP Results       07/10/19 07/09/19 07/09/19                     0345 [id number] 1922          - 139         K 3.9 3.7 4.2            4.2         Cl 106 - 103         CO2 23 - 25         Glucose 146 (H) - 163 (H)         BUN 13 - 9         Creatinine 0.8 - 0.8         Calcium 8.9 - 9.1         Anion Gap 9 - 11         EGFR &gt;60.0 - &gt;60.0                   A1C-5.4      Pertinent Medications  Pertinent Medications Reviewed: reviewed, pertinent     acetaminophen 975 mg oral q6h CATHY   ceFAZolin 2 g intravenous q8h INT   chlorhexidine 15 mL Mouth/Throat 2 times per day   colchicine 0.6 mg oral Daily   docusate sodium 100 mg oral BID   enoxaparin 40 mg subcutaneous Daily (6p)   famotidine 20 mg intravenous BID   Or      famotidine 20 mg oral BID   fluticasone furoate-vilanterol 1 puff inhalation Daily   fluticasone propionate 1 spray Each Nostril Nightly   furosemide 40 mg oral BID (9a, 4p)   Followed by      [START ON 7/13/2019] furosemide 40 mg oral Daily   magnesium oxide 400 mg oral BID   metoprolol succinate XL 25 mg oral Nightly   Or      metoprolol succinate XL 50 mg oral Nightly   montelukast 10 mg oral Nightly   potassium chloride 20 mEq oral BID   rosuvastatin 10 mg oral Nightly   IV- insulin, dobutamine, levo        Skin: surgical incision    Clinical comments: POD #1 excision of fibroelastoma. Extubated and tolerating clear liquids. Reports good appetite PTA without dietary restrictions. Denies weight loss.  Advance diet to Cardiac.    Goals: Will eat 75% of meals   Monitor: appetite    Recommendations: See above       Date: 07/10/19  Signature: Guerda Thornton RD

## 2019-07-10 NOTE — CONSULTS
Electrophysiology Consult Note      Reason for consult:   Referred by: José Mart, *      SUBJECTIVE    Saad Hillman is a 74 y.o. male who was admitted for resection of Papillary fibroelastoma [D15.1]  Papillary fibroelastoma of heart [D15.1].  EP is consulted for junctional rhythm that developed post-op.  Patient feels well post-op. He denies lightheadedness even upon ambulation. He further denies dyspnea, chest pain, near syncope/syncope.    Allergies: Aspirin and Hydrocodone-acetaminophen    Home medications  •  acetaminophen, Take 500 mg by mouth every 6 (six) hours as needed for mild pain.  •  albuterol HFA, Inhale 2 puffs every 6 (six) hours as needed.  •  fluticasone propion-salmeterol, Inhale 1 puff 2 (two) times a day.    •  fluticasone propionate, Administer 1 spray into affected nostril(s) nightly.    •  hydrochlorothiazide, Take 25 mg by mouth every morning.    •  montelukast, Take 10 mg by mouth nightly.    •  mupirocin, Use dime size amt in each nostril twice daily five (5) days prior to surgery.  •  rosuvastatin, Take 10 mg by mouth nightly. Instructed to stop 7 days prior to surgery per Dr Mart' office   •  valsartan, Take 80 mg by mouth every morning. Instructed to stop 2 days prior to surgery per Dr Mart' office     Inpatient medications  •  acetaminophen, 975 mg, oral, q6h CATHY  •  albumin human, 500 mL, intravenous, PRN  •  albuterol, 2.5 mg, nebulization, q6h PRN  •  alum-mag hydroxide-simeth, 30 mL, oral, q4h PRN  •  [START ON 7/12/2019] bisacodyl, 10 mg, rectal, PRN  •  calcium chloride, 1 g, intravenous, q6h PRN  •  chlorhexidine, 15 mL, Mouth/Throat, 2 times per day  •  colchicine, 0.6 mg, oral, Daily  •  insulin, 0-15 Units/hr, intravenous, Titrated **AND** dextrose in water, 10-30 mL, intravenous, PRN  •  DOBUTamine, 1-5 mcg/kg/min, intravenous, Continuous  •  docusate sodium, 100 mg, oral, BID  •  enoxaparin, 40 mg, subcutaneous, Daily (6p)  •  famotidine, 20 mg,  intravenous, BID **OR** famotidine, 20 mg, oral, BID  •  fluticasone furoate-vilanterol, 1 puff, inhalation, Daily  •  fluticasone propionate, 1 spray, Each Nostril, Nightly  •  furosemide, 40 mg, oral, BID (9a, 4p) **FOLLOWED BY** [START ON 7/13/2019] furosemide, 40 mg, oral, Daily  •  oxyCODONE, 5-10 mg, oral, q4h PRN **OR** HYDROmorphone, 0.25-0.5 mg, intravenous, q1h PRN  •  ketorolac, 15 mg, intravenous, q6h PRN  •  magnesium oxide, 400 mg, oral, BID  •  magnesium sulfate, 2 g, intravenous, PRN  •  meperidine, 12.5 mg, intravenous, PRN  •  metoprolol succinate XL, 25 mg, oral, Nightly **OR** metoprolol succinate XL, 50 mg, oral, Nightly  •  montelukast, 10 mg, oral, Nightly  •  niCARdipine, 2.5-15 mg/hr, intravenous, Titrated  •  norepinephrine, 2-8 mcg/min, intravenous, Titrated  •  ondansetron ODT, 4 mg, oral, q8h PRN **OR** ondansetron, 4 mg, intravenous, q8h PRN  •  potassium chloride, 20 mEq, oral, BID  •  potassium chloride, 20 mEq, intravenous, q8h PRN  •  rosuvastatin, 10 mg, oral, Nightly  •  senna, 1 tablet, oral, 2x daily PRN  •  sodium chloride 0.9 %, , intravenous, Continuous  •  tamsulosin, 0.4 mg, oral, Daily    History  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       Surgical History:   Past Surgical History:   Procedure Laterality Date   • FINGER SURGERY      s/p traumatic amputation- had repaired    • HERNIA REPAIR  2012    left inguinal hernia repair    • LIPOMA RESECTION  1999    right shoulder x 2 and back of neck    • NASAL POLYP EXCISION  1966- 1999    x  4    • TONSILLECTOMY  1948   • TRANSURETHRAL RESECTION OF PROSTATE  2017   • VASECTOMY  1980's       Social History:   Social History     Social History   • Marital status:      Spouse name: Umu    • Number of children: 2   • Years of education: N/A     Social History Main Topics   • Smoking status: Never Smoker   • Smokeless tobacco: Never Used   • Alcohol use Yes      Comment: occasional    • Drug use: No   • Sexual activity: Not Asked     Other Topics Concern   • None     Social History Narrative   • None       Family History:   Family History   Problem Relation Age of Onset   • Abdominal Aortic Aneurysm Mother    • Hyperlipidemia Mother    • Stroke Father          Review of Systems : Aside from HPI, ROS otherwise negative.      OBJECTIVE  BP (!) 95/39   Pulse 65   Temp 36.1 °C (97 °F) (Temporal)   Resp 13   Wt 84.8 kg (186 lb 15.2 oz)   SpO2 99%   BMI 27.61 kg/m²     Physical Exam   Constitutional: Well-developed and well-nourished. No distress.   Neck: No JVD present.   Chest: Dressings in anterior chest CDI  Cardiovascular: Normal rate and regular rhythm. 1/6 systolic murmur.  Pulmonary/Chest: Good bilateral breath sounds, clear lung fields  Abdominal: Normal bowel sounds. Soft, non tender, no distension. No rebound or guarding.   Extremities: No LE edema bilaterally. 1+ pulses in hnasel LEs   Neurological: Alert and oriented to person, place, and time.   Skin: Skin is warm and dry.   Psychiatric: Normal mood, affect and behavior.    Labs    Results from last 7 days  Lab Units 07/10/19  1355 07/10/19  0826 07/10/19  0345  07/09/19  1922   SODIUM mEQ/L  --   --  138  --  139   POTASSIUM mEQ/L 4.2 5.5* 3.9  < > 4.2  4.2   CHLORIDE mEQ/L  --   --  106  --  103   CO2 mEQ/L  --   --  23  --  25   BUN mg/dL  --   --  13  --  9   CREATININE mg/dL  --   --  0.8  --  0.8   CALCIUM mg/dL  --   --  8.9  --  9.1   GLUCOSE mg/dL  --   --  146*  --  163*   < > = values in this interval not displayed.             Results from last 7 days  Lab Units 07/10/19  0345 07/09/19  2341 07/09/19  1922   WBC K/uL 10.11 10.73* 9.80   HEMOGLOBIN g/dL 10.9* 11.9* 11.2*   HEMATOCRIT % 32.7* 34.7* 34.0*   PLATELETS K/uL 230 254 261         Cardiology results  ECG : Sinus rhythm with some episodes of junctional escape  Telemetry : Sinus rhythm with some episodes of junctional escape         ASSESSMENT AND PLAN  74 y.o. male, Electrophysiology is now being consulted for    Junctional bradycardia   Assessment & Plan    Occurred after resection of fibroelastoma of the mitral valve  Suspect that the sinus node will recover with a couple of days  Transvenous pacer in place; may pace if needed, however has had an adequate junctional escape so far  If patient becomes profoundly bradycardic, may benefit from keeping low dose Dobutamine/Dopamine to maintain rhythm     Asthma   Assessment & Plan    S/P minimally invasive surgical resection  Per CT surg         Thank you for your consultation. Will continue to follow.    Patient seen and discussed with my attending physician, Dr. Johnson, who agrees with the management plan.    Donald Mark MD  7/10/2019  5:02 PM

## 2019-07-10 NOTE — CONSULTS
Physical Medicine and Rehabilitation Consult Note    Subjective   Saad Hillman is a 74-year old male with a several month history of upper extremity and lower extremity edema.  There was no associated chest pain shortness of breath or palpitation.  Eventually, an echocardiogram showed a papillary fibroblastoma attached to the mitral valve leaflet.  On July 9, 2019 he had resection of the fibroblastoma.  Postoperatively he has mild chest and neck discomfort.  There is no change in the tingling in his hands.  A Whyte catheter is in place though he is usually continent of bowel and bladder.  He was transfused.    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  No musculoskeletal limitations.  Prior Function Level:      History also provided by      Family History:   Family History   Problem Relation Age of Onset   • Abdominal Aortic Aneurysm Mother    • Hyperlipidemia Mother    • Stroke Father         Meds:      acetaminophen 975 mg oral q6h CATHY   ceFAZolin 2 g  intravenous q8h INT   chlorhexidine 15 mL Mouth/Throat 2 times per day   colchicine 0.6 mg oral Daily   docusate sodium 100 mg oral BID   enoxaparin 40 mg subcutaneous Daily (6p)   famotidine 20 mg intravenous BID   Or      famotidine 20 mg oral BID   fluticasone furoate-vilanterol 1 puff inhalation Daily   fluticasone propionate 1 spray Each Nostril Nightly   furosemide 40 mg oral BID (9a, 4p)   Followed by      [START ON 7/13/2019] furosemide 40 mg oral Daily   magnesium oxide 400 mg oral BID   metoclopramide 10 mg intravenous Once   metoprolol succinate XL 25 mg oral Nightly   Or      metoprolol succinate XL 50 mg oral Nightly   montelukast 10 mg oral Nightly   potassium chloride 20 mEq oral BID   rosuvastatin 10 mg oral Nightly       Review of Systems  All 11 systems were reviewed and negative except as noted in the HPI.    Objective       Lab Results   Component Value Date    WBC 10.11 07/10/2019    HGB 10.9 (L) 07/10/2019    HCT 32.7 (L) 07/10/2019     07/10/2019    ALT 21 06/25/2019    AST 19 06/25/2019     07/10/2019    K 3.9 07/10/2019     07/10/2019    CREATININE 0.8 07/10/2019    BUN 13 07/10/2019    CO2 23 07/10/2019    TSH 1.04 07/02/2019    INR 1.3 07/09/2019    HGBA1C 5.4 07/02/2019       Labs creatinine normal    Imaging chest x-ray was reviewed and shows possible left pneumothorax.      Physical Exam  BP (!) 144/76   Pulse 72   Temp 36.1 °C (97 °F) (Temporal)   Resp (!) 21   Wt 84.8 kg (186 lb 15.2 oz)   SpO2 93%   BMI 27.61 kg/m²   General: Pleasant well-developed adult male sitting back in a recliner in no acute discomfort.  HEENT: No jaundice.  Oral mucosa moist.  No carotid bruits.  Atraumatic.  No nuchal rigidity.  Lungs: Breathing unlabored.  No rales or rhonchi.  Decreased air movement bilaterally.  Chest wall: Chest tube in place  Heart: Regular.  Rub  Abdomen: Bowel sounds: Decreased  Skin: No rash.  Extremities: No acutely inflamed joints.  ROM functional.  Good  pedal pulses.  No significant edema in the upper or lower extremities.  Neurological:  Alert and oriented with intact speech and cognition.  Decreased hearing  Sensation: subjectively preserved to touch.  Motor testing shows no focal weakness.    Assessment     Papillary fibroelastoma of heart   Assessment & Plan    This was manifested by edema in the arms and legs.  He has had resection and is doing well postoperatively.  We hope to begin mobilization later today or tomorrow morning.  The goal will be to ambulate 50 to 100 feet on one level so that he can return directly home.    He has a history of hypertension.  Pressure is good on metoprolol.  He was on hydrochlorothiazide and valsartan as an outpatient.     Asthma   Assessment & Plan    As an outpatient, he was on Singulair and inhalers.  His breathing is currently unlabored and there is no wheezing.  We will follow oxygen saturations with activity.  He is on albuterol, Brio Ellipta, Flonase, Singulair.            Plan of care was discussed with      Aaron Valencia MD  7/10/2019  8:10 AM

## 2019-07-10 NOTE — PROGRESS NOTES
[facility] Received referral for homecare . Chart reviewed and will follow along for homecare needs. Dhruv Cagle RN b [id number]

## 2019-07-10 NOTE — ASSESSMENT & PLAN NOTE
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  He has return of sinus node function with sinus rates in the 70s.  There is no indication for pacemaker at this time.

## 2019-07-10 NOTE — PLAN OF CARE
Problem: Patient Care Overview  Goal: Plan of Care Review  Outcome: Ongoing (interventions implemented as appropriate)   07/10/19 0219   Coping/Psychosocial   Plan Of Care Reviewed With patient   Plan of Care Review   Progress progress toward functional goals as expected   Outcome Summary PT eval completed, pt dem 1 person A w/func mob      Goal: Individualization & Mutuality  Outcome: Ongoing (interventions implemented as appropriate)      Problem: Cardiac Surgery (Adult)  Goal: Signs and Symptoms of Listed Potential Problems Will be Absent, Minimized or Managed (Cardiac Surgery)  Outcome: Ongoing (interventions implemented as appropriate)      Problem: Acute Therapy Services Goal & Intervention Plan  Goal: Bed Mobility Goal  Outcome: Ongoing (interventions implemented as appropriate)    Goal: Gait Training Goal  Outcome: Ongoing (interventions implemented as appropriate)    Goal: Stairs Goal  Outcome: Ongoing (interventions implemented as appropriate)    Goal: Transfer Training Goal  Outcome: Ongoing (interventions implemented as appropriate)

## 2019-07-10 NOTE — PATIENT CARE CONFERENCE
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

## 2019-07-10 NOTE — HOSPITAL COURSE
Michael is a 74 y.o. 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    Michael has a past medical history of Arthritis; Asthma; Atrial myxoma; BPH (benign prostatic hyperplasia); Hypertension; Lipid disorder; Medial meniscus tear; Seasonal allergies; and Traumatic amputation of finger (2015).     Admit for papillary fibroelastoma removal> +sternotomy     pt converted to NSR overnight 7/12

## 2019-07-11 ENCOUNTER — APPOINTMENT (INPATIENT)
Dept: RADIOLOGY | Facility: HOSPITAL | Age: 74
DRG: 229 | End: 2019-07-11
Attending: NURSE PRACTITIONER
Payer: COMMERCIAL

## 2019-07-11 ENCOUNTER — APPOINTMENT (INPATIENT)
Dept: RADIOLOGY | Facility: HOSPITAL | Age: 74
DRG: 229 | End: 2019-07-11
Attending: THORACIC SURGERY (CARDIOTHORACIC VASCULAR SURGERY)
Payer: COMMERCIAL

## 2019-07-11 LAB
ANION GAP SERPL CALC-SCNC: 8 MEQ/L (ref 3–15)
ATRIAL RATE: 32
ATRIAL RATE: 62
ATRIAL RATE: 66
BASE EXCESS BLDA CALC-SCNC: -1.3 MEQ/L
BASE EXCESS BLDA CALC-SCNC: 0.7 MEQ/L
BUN SERPL-MCNC: 17 MG/DL (ref 8–20)
CA-I BLD-SCNC: 1.25 MMOL/L (ref 1.15–1.27)
CA-I BLD-SCNC: 1.29 MMOL/L (ref 1.15–1.27)
CALCIUM SERPL-MCNC: 9 MG/DL (ref 8.9–10.3)
CASE RPRT: NORMAL
CHLORIDE SERPL-SCNC: 105 MEQ/L (ref 98–109)
CLINICAL INFO: NORMAL
CO2 BLDA-SCNC: 24.9 MEQ/L (ref 22–32)
CO2 BLDA-SCNC: 27 MEQ/L (ref 22–32)
CO2 SERPL-SCNC: 23 MEQ/L (ref 22–32)
COHGB MFR BLD: 1.4 % (ref 0–1.5)
CREAT SERPL-MCNC: 0.9 MG/DL
CROSSMATCH: NORMAL
CROSSMATCH: NORMAL
ERYTHROCYTE [DISTWIDTH] IN BLOOD BY AUTOMATED COUNT: 13.2 % (ref 11.6–14.4)
FIO2 ON VENT: ABNORMAL %
GFR SERPL CREATININE-BSD FRML MDRD: >60 ML/MIN/1.73M*2
GLUCOSE BLD-MCNC: 101 MG/DL (ref 70–99)
GLUCOSE BLD-MCNC: 104 MG/DL (ref 70–99)
GLUCOSE BLD-MCNC: 110 MG/DL (ref 70–99)
GLUCOSE BLDA-MCNC: 110 MG/DL (ref 65–95)
GLUCOSE BLDA-MCNC: 116 MG/DL (ref 65–95)
GLUCOSE SERPL-MCNC: 114 MG/DL (ref 70–99)
HCO3 BLDA-SCNC: 23.7 MEQ/L (ref 21–28)
HCO3 BLDA-SCNC: 25 MEQ/L (ref 21–28)
HCT VFR BLDA CALC: 28 % (ref 42–52)
HCT VFR BLDA CALC: 32 % (ref 36–48)
HCT VFR BLDCO AUTO: 29.1 %
HGB BLD-MCNC: 9.5 G/DL
HGB BLDA OXIMETRY-MCNC: 9.6 G/DL (ref 14–17.5)
INHALED O2 CONCENTRATION: ABNORMAL %
ISBT CODE: 5100
ISBT CODE: 6200
LACTATE BLDA-SCNC: 0.9 MMOL/L (ref 0.4–1.6)
LACTATE BLDA-SCNC: 1.2 MMOL/L (ref 0.4–1.6)
MAGNESIUM SERPL-MCNC: 2.3 MG/DL (ref 1.8–2.5)
MCH RBC QN AUTO: 29.9 PG (ref 28–33.2)
MCHC RBC AUTO-ENTMCNC: 32.6 G/DL (ref 32.2–36.5)
MCV RBC AUTO: 91.5 FL (ref 83–98)
METHGB BLD-SCNC: 1.1 % (ref 0.4–1.5)
P AXIS: -72
PATH REPORT.FINAL DX SPEC: NORMAL
PATH REPORT.GROSS SPEC: NORMAL
PCO2 BLDA: 40 MM HG (ref 35–48)
PCO2 BLDA: 40 MM HG (ref 35–48)
PDW BLD AUTO: 10.5 FL (ref 9.4–12.4)
PH BLDA: 7.38 [PH] (ref 7.35–7.45)
PH BLDA: 7.41 PH (ref 7.35–7.45)
PHOSPHATE SERPL-MCNC: 2.6 MG/DL (ref 2.4–4.7)
PLATELET # BLD AUTO: 209 K/UL
PO2 BLDA: 36 MM HG (ref 83–100)
PO2 BLDA: 74 MM HG (ref 83–100)
POCT PATIENT TEMPERATURE: 98.6 °F (ref 97–99)
POCT TEST (BLD GAS): ABNORMAL
POCT TEST: ABNORMAL
POTASSIUM BLDA-SCNC: 3.8 MEQ/L (ref 3.4–4.5)
POTASSIUM BLDA-SCNC: 3.9 MEQ/L (ref 3.4–4.5)
POTASSIUM SERPL-SCNC: 4 MEQ/L (ref 3.6–5.1)
PR INTERVAL: 102
PR INTERVAL: 152
PRODUCT CODE: NORMAL
PRODUCT STATUS: NORMAL
QRS DURATION: 102
QRS DURATION: 106
QRS DURATION: 120
QT INTERVAL: 394
QT INTERVAL: 396
QT INTERVAL: 410
QTC CALCULATION(BAZETT): 399
QTC CALCULATION(BAZETT): 415
QTC CALCULATION(BAZETT): 439
R AXIS: -54
R AXIS: 58
R AXIS: 61
RBC # BLD AUTO: 3.18 M/UL (ref 4.5–5.8)
SAO2 % BLDA: 61 % (ref 93–98)
SAO2 % BLDA: 95 % (ref 93–98)
SAO2 % BLDV: 61.4 % (ref 30–60)
SAO2 % BLDV: 65.5 % (ref 30–60)
SODIUM BLDA-SCNC: 137 MEQ/L (ref 136–145)
SODIUM SERPL-SCNC: 136 MEQ/L (ref 136–144)
SPECIMEN EXP DATE BLD: NORMAL
T WAVE AXIS: 69
T WAVE AXIS: 79
T WAVE AXIS: 96
UNIT ABO: NORMAL
UNIT ID: NORMAL
UNIT RH: POSITIVE
VENTRICULAR RATE: 62
VENTRICULAR RATE: 66
VENTRICULAR RATE: 69
WBC # BLD AUTO: 8.65 K/UL

## 2019-07-11 PROCEDURE — 99291 CRITICAL CARE FIRST HOUR: CPT | Mod: 25 | Performed by: ANESTHESIOLOGY

## 2019-07-11 PROCEDURE — 82810 BLOOD GASES O2 SAT ONLY: CPT | Performed by: NURSE PRACTITIONER

## 2019-07-11 PROCEDURE — 99233 SBSQ HOSP IP/OBS HIGH 50: CPT | Performed by: INTERNAL MEDICINE

## 2019-07-11 PROCEDURE — 93010 ELECTROCARDIOGRAM REPORT: CPT | Mod: 76 | Performed by: INTERNAL MEDICINE

## 2019-07-11 PROCEDURE — 94640 AIRWAY INHALATION TREATMENT: CPT

## 2019-07-11 PROCEDURE — 93005 ELECTROCARDIOGRAM TRACING: CPT | Performed by: THORACIC SURGERY (CARDIOTHORACIC VASCULAR SURGERY)

## 2019-07-11 PROCEDURE — 32551 INSERTION OF CHEST TUBE: CPT | Performed by: SURGERY

## 2019-07-11 PROCEDURE — 83605 ASSAY OF LACTIC ACID: CPT | Performed by: PHYSICIAN ASSISTANT

## 2019-07-11 PROCEDURE — 25000000 HC PHARMACY GENERAL

## 2019-07-11 PROCEDURE — 97116 GAIT TRAINING THERAPY: CPT | Mod: GP

## 2019-07-11 PROCEDURE — 63700000 HC SELF-ADMINISTRABLE DRUG: Performed by: PHYSICIAN ASSISTANT

## 2019-07-11 PROCEDURE — 63600000 HC DRUGS/DETAIL CODE: Performed by: NURSE PRACTITIONER

## 2019-07-11 PROCEDURE — 83735 ASSAY OF MAGNESIUM: CPT | Performed by: PHYSICIAN ASSISTANT

## 2019-07-11 PROCEDURE — 80048 BASIC METABOLIC PNL TOTAL CA: CPT | Performed by: PHYSICIAN ASSISTANT

## 2019-07-11 PROCEDURE — 36620 INSERTION CATHETER ARTERY: CPT | Mod: 78 | Performed by: ANESTHESIOLOGY

## 2019-07-11 PROCEDURE — 20000000 HC ROOM AND CARE ICU

## 2019-07-11 PROCEDURE — 85027 COMPLETE CBC AUTOMATED: CPT | Performed by: PHYSICIAN ASSISTANT

## 2019-07-11 PROCEDURE — 71045 X-RAY EXAM CHEST 1 VIEW: CPT

## 2019-07-11 PROCEDURE — 84100 ASSAY OF PHOSPHORUS: CPT | Performed by: PHYSICIAN ASSISTANT

## 2019-07-11 PROCEDURE — 97110 THERAPEUTIC EXERCISES: CPT | Mod: GP

## 2019-07-11 PROCEDURE — 25000000 HC PHARMACY GENERAL: Performed by: NURSE PRACTITIONER

## 2019-07-11 PROCEDURE — 63600000 HC DRUGS/DETAIL CODE: Performed by: THORACIC SURGERY (CARDIOTHORACIC VASCULAR SURGERY)

## 2019-07-11 PROCEDURE — 63600000 HC DRUGS/DETAIL CODE: Performed by: PHYSICIAN ASSISTANT

## 2019-07-11 PROCEDURE — 63700000 HC SELF-ADMINISTRABLE DRUG: Performed by: NURSE PRACTITIONER

## 2019-07-11 PROCEDURE — 27200122 HC DRAIN CHEST TUBE

## 2019-07-11 PROCEDURE — 36415 COLL VENOUS BLD VENIPUNCTURE: CPT | Performed by: PHYSICIAN ASSISTANT

## 2019-07-11 RX ORDER — FUROSEMIDE 10 MG/ML
40 INJECTION INTRAMUSCULAR; INTRAVENOUS
Status: DISCONTINUED | OUTPATIENT
Start: 2019-07-11 | End: 2019-07-14

## 2019-07-11 RX ORDER — PROPOFOL 200MG/20ML
SYRINGE (ML) INTRAVENOUS
Status: COMPLETED | OUTPATIENT
Start: 2019-07-11 | End: 2019-07-11

## 2019-07-11 RX ORDER — LIDOCAINE HYDROCHLORIDE 10 MG/ML
INJECTION, SOLUTION INFILTRATION; PERINEURAL
Status: COMPLETED
Start: 2019-07-11 | End: 2019-07-11

## 2019-07-11 RX ORDER — LIDOCAINE HYDROCHLORIDE 10 MG/ML
10 INJECTION, SOLUTION EPIDURAL; INFILTRATION; INTRACAUDAL; PERINEURAL ONCE
Status: ACTIVE | OUTPATIENT
Start: 2019-07-11 | End: 2019-07-11

## 2019-07-11 RX ADMIN — KETOROLAC TROMETHAMINE 15 MG: 15 INJECTION, SOLUTION INTRAMUSCULAR; INTRAVENOUS at 20:42

## 2019-07-11 RX ADMIN — POTASSIUM CHLORIDE 20 MEQ: 750 TABLET, FILM COATED, EXTENDED RELEASE ORAL at 19:37

## 2019-07-11 RX ADMIN — ROSUVASTATIN CALCIUM 10 MG: 10 TABLET, FILM COATED ORAL at 19:37

## 2019-07-11 RX ADMIN — ACETAMINOPHEN 975 MG: 325 TABLET, FILM COATED ORAL at 15:38

## 2019-07-11 RX ADMIN — PROPOFOL 30 MG: 10 INJECTION, EMULSION INTRAVENOUS at 07:37

## 2019-07-11 RX ADMIN — OXYCODONE HYDROCHLORIDE 10 MG: 5 TABLET ORAL at 15:38

## 2019-07-11 RX ADMIN — FLUTICASONE PROPIONATE 1 SPRAY: 50 SPRAY, METERED NASAL at 19:38

## 2019-07-11 RX ADMIN — ACETAMINOPHEN 975 MG: 325 TABLET, FILM COATED ORAL at 09:07

## 2019-07-11 RX ADMIN — DOCUSATE SODIUM 100 MG: 100 CAPSULE, LIQUID FILLED ORAL at 09:06

## 2019-07-11 RX ADMIN — PROPOFOL 20 MG: 10 INJECTION, EMULSION INTRAVENOUS at 07:42

## 2019-07-11 RX ADMIN — ENOXAPARIN SODIUM 40 MG: 100 INJECTION SUBCUTANEOUS at 19:37

## 2019-07-11 RX ADMIN — DOCUSATE SODIUM 100 MG: 100 CAPSULE, LIQUID FILLED ORAL at 19:37

## 2019-07-11 RX ADMIN — NOREPINEPHRINE BITARTRATE 4 MG/250 ML (16 MCG/ML) IN 0.9 % NACL IV 2 MCG/MIN: at 13:17

## 2019-07-11 RX ADMIN — POTASSIUM CHLORIDE 20 MEQ: 750 TABLET, FILM COATED, EXTENDED RELEASE ORAL at 09:07

## 2019-07-11 RX ADMIN — CHLORHEXIDINE GLUCONATE 15 ML: 1.2 RINSE ORAL at 19:39

## 2019-07-11 RX ADMIN — FAMOTIDINE 20 MG: 20 TABLET ORAL at 19:37

## 2019-07-11 RX ADMIN — PROPOFOL 50 MG: 10 INJECTION, EMULSION INTRAVENOUS at 07:44

## 2019-07-11 RX ADMIN — ACETAMINOPHEN 975 MG: 325 TABLET, FILM COATED ORAL at 21:00

## 2019-07-11 RX ADMIN — FUROSEMIDE 40 MG: 10 INJECTION, SOLUTION INTRAMUSCULAR; INTRAVENOUS at 17:25

## 2019-07-11 RX ADMIN — FLUTICASONE FUROATE AND VILANTEROL TRIFENATATE 1 PUFF: 100; 25 POWDER RESPIRATORY (INHALATION) at 08:50

## 2019-07-11 RX ADMIN — FAMOTIDINE 20 MG: 20 TABLET ORAL at 09:06

## 2019-07-11 RX ADMIN — TAMSULOSIN HYDROCHLORIDE 0.4 MG: 0.4 CAPSULE ORAL at 09:07

## 2019-07-11 RX ADMIN — KETOROLAC TROMETHAMINE 15 MG: 15 INJECTION, SOLUTION INTRAMUSCULAR; INTRAVENOUS at 04:23

## 2019-07-11 RX ADMIN — COLCHICINE 0.6 MG: 0.6 TABLET, FILM COATED ORAL at 09:06

## 2019-07-11 RX ADMIN — CHLORHEXIDINE GLUCONATE 15 ML: 1.2 RINSE ORAL at 09:08

## 2019-07-11 RX ADMIN — POTASSIUM CHLORIDE 20 MEQ: 14.9 INJECTION, SOLUTION INTRAVENOUS at 05:07

## 2019-07-11 RX ADMIN — PROPOFOL 20 MG: 10 INJECTION, EMULSION INTRAVENOUS at 07:43

## 2019-07-11 RX ADMIN — FUROSEMIDE 40 MG: 10 INJECTION, SOLUTION INTRAMUSCULAR; INTRAVENOUS at 09:07

## 2019-07-11 RX ADMIN — HYDROMORPHONE HYDROCHLORIDE 0.5 MG: 1 INJECTION, SOLUTION INTRAMUSCULAR; INTRAVENOUS; SUBCUTANEOUS at 09:04

## 2019-07-11 RX ADMIN — LIDOCAINE HYDROCHLORIDE 10 ML: 10 INJECTION, SOLUTION INFILTRATION; PERINEURAL at 07:42

## 2019-07-11 RX ADMIN — Medication 400 MG: at 19:37

## 2019-07-11 RX ADMIN — MONTELUKAST SODIUM 10 MG: 10 TABLET, FILM COATED ORAL at 19:37

## 2019-07-11 RX ADMIN — Medication 400 MG: at 09:07

## 2019-07-11 ASSESSMENT — COGNITIVE AND FUNCTIONAL STATUS - GENERAL
MOVING TO AND FROM BED TO CHAIR: 3 - A LITTLE
WALKING IN HOSPITAL ROOM: 3 - A LITTLE
CLIMB 3 TO 5 STEPS WITH RAILING: 3 - A LITTLE
STANDING UP FROM CHAIR USING ARMS: 3 - A LITTLE

## 2019-07-11 NOTE — PROGRESS NOTES
Electrophysiology Progress Note    SUBJECTIVE  Mr Hillman feels well this morning. He underwent chest tube insertion over night and states that his breathing improved after. He denies lightheadedness.    Inpatient medications:  •  acetaminophen, 975 mg, oral, q6h CATHY  •  albumin human, 500 mL, intravenous, PRN  •  albuterol, 2.5 mg, nebulization, q6h PRN  •  alum-mag hydroxide-simeth, 30 mL, oral, q4h PRN  •  [START ON 7/12/2019] bisacodyl, 10 mg, rectal, PRN  •  calcium chloride, 1 g, intravenous, q6h PRN  •  chlorhexidine, 15 mL, Mouth/Throat, 2 times per day  •  colchicine, 0.6 mg, oral, Daily  •  insulin, 0-15 Units/hr, intravenous, Titrated **AND** dextrose in water, 10-30 mL, intravenous, PRN  •  DOBUTamine, 1-5 mcg/kg/min, intravenous, Continuous  •  docusate sodium, 100 mg, oral, BID  •  enoxaparin, 40 mg, subcutaneous, Daily (6p)  •  famotidine, 20 mg, intravenous, BID **OR** famotidine, 20 mg, oral, BID  •  fluticasone furoate-vilanterol, 1 puff, inhalation, Daily  •  fluticasone propionate, 1 spray, Each Nostril, Nightly  •  furosemide, 40 mg, intravenous, BID (9a, 4p)  •  oxyCODONE, 5-10 mg, oral, q4h PRN **OR** HYDROmorphone, 0.25-0.5 mg, intravenous, q1h PRN  •  ketorolac, 15 mg, intravenous, q6h PRN  •  lidocaine PF, 10 mL, injection, Once  •  magnesium oxide, 400 mg, oral, BID  •  magnesium sulfate, 2 g, intravenous, PRN  •  meperidine, 12.5 mg, intravenous, PRN  •  metoprolol succinate XL, 25 mg, oral, Nightly **OR** metoprolol succinate XL, 50 mg, oral, Nightly  •  montelukast, 10 mg, oral, Nightly  •  niCARdipine, 2.5-15 mg/hr, intravenous, Titrated  •  norepinephrine, 2-8 mcg/min, intravenous, Titrated  •  ondansetron ODT, 4 mg, oral, q8h PRN **OR** ondansetron, 4 mg, intravenous, q8h PRN  •  potassium chloride, 20 mEq, oral, BID  •  potassium chloride, 20 mEq, intravenous, q8h PRN  •  rosuvastatin, 10 mg, oral, Nightly  •  senna, 1 tablet, oral, 2x daily PRN  •  sodium chloride 0.9 %, ,  intravenous, Continuous  •  tamsulosin, 0.4 mg, oral, Daily    Review of Systems: Aside from HPI, review of systems otherwise normal.    OBJECTIVE:   /66   Pulse 71   Temp 36.9 °C (98.5 °F) (Temporal)   Resp 14   Wt 88.7 kg (195 lb 8.8 oz)   SpO2 96%   BMI 28.88 kg/m²     Physical Exam   Constitutional: Well-developed and well-nourished. No acute distress.   HENT: Normocephalic and atraumatic. Moist mucous membranes.  Neck: No JVD present.   Cardiovascular: Normal rate and regular rhythm. No murmur, rub or gallop. Pulmonary/Chest: Good bilateral breath sounds, clear lung fields. No crackles, no wheezes  Abdominal: Normal bowel sounds. Soft, non tender, no distension. No rebound or guarding.   Extremities: No LE edema hansel. 2+ pulses in hansel LEs  Neurological: Alert and oriented to person, place, and time.   Skin: Skin is warm and dry.  Psychiatric: Normal mood, affect and behavior.    Labs    Results from last 7 days  Lab Units 07/11/19  0305 07/10/19  1610 07/10/19  1355  07/10/19  0345 07/09/19  1922   SODIUM mEQ/L 136  --   --   --  138  --  139   POTASSIUM mEQ/L 4.0 4.1 4.2  < > 3.9  < > 4.2  4.2   CHLORIDE mEQ/L 105  --   --   --  106  --  103   CO2 mEQ/L 23  --   --   --  23  --  25   BUN mg/dL 17  --   --   --  13  --  9   CREATININE mg/dL 0.9  --   --   --  0.8  --  0.8   CALCIUM mg/dL 9.0  --   --   --  8.9  --  9.1   GLUCOSE mg/dL 114*  --   --   --  146*  --  163*   < > = values in this interval not displayed.    Results from last 7 days  Lab Units 07/11/19  0305 07/10/19  0345 07/09/19  2341   WBC K/uL 8.65 10.11 10.73*   HEMOGLOBIN g/dL 9.5* 10.9* 11.9*   HEMATOCRIT % 29.1* 32.7* 34.7*   PLATELETS K/uL 209 230 254             Cardiology results  ECG : Junctional rhythm with atrial sinus beats  Telemetry : Mostly junctional rhythm with some low atrial rhythm    Imaging: Imaging reviewed.    ASSESSMENT AND PLAN  74 y.o. 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 Electrophysiology is consulted for    Junctional bradycardia   Assessment & Plan    Occurred after resection of fibroelastoma of the mitral valve  Suspect that the sinus node will recover with a couple of days  Transvenous pacer in place; may pace if needed, however has had an adequate junctional escape so far  If patient becomes profoundly bradycardic, may benefit from keeping low dose Dobutamine/Dopamine to maintain rhythm     Papillary fibroelastoma of heart   Assessment & Plan    S/P minimally invasive surgical resection  Per CT surg           Patient seen and discussed with my attending physician, Dr Johnson, who agrees with the management plan.    Donald Mark MD  7/11/2019  9:59 AM

## 2019-07-11 NOTE — PROGRESS NOTES
Patient: Saad Hillman  Location: [address]  MRN: [id number]  Today's date: 7/11/2019     Patient completed cardiac rehabilitation exercise program:  seated warm-ups for vasodilation x 10 each and cool-downs for venous return x 5 each. Ankle pumps, LAQ, hip flexion, bicep curls, shoulder flexion. Verbal cues for technique.    Patient performed standing therapeutic exercises: heel raises, hip flexion, hamstring curls, x 10 each.  VCs for technique.    Patient ambulated 83 feet with HHA and min A, step-through pattern.  Distance limited by multiple lines/tubes, pt ambulated 3' fwd/bkwd with reps.  Verbal cues for erect posture.    Patient reported 15/20 using [place] Rate of Perceived Exertion Scale.      Patient left sitting in chair, all stated needs met.  RN aware.    Hospital Course  Michael is a 74 y.o. 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    Michael has a past medical history of Arthritis; Asthma; Atrial myxoma; BPH (benign prostatic hyperplasia); Hypertension; Lipid disorder; Medial meniscus tear; Seasonal allergies; and Traumatic amputation of finger (2015).     Admit for papillary fibroelastoma removal> +sternotomy           Therapy Pain/Vitals     Row Name 07/11/19 1431 07/11/19 1455          Pain/Comfort/Sleep    Pain Charting Type Pain Reassessment  --     Pain Body Location chest   R lateral chest  --     Pain Rating (0-10): Rest 4  --     Pain Rating (0-10): Activity 4  --     Pain Management Interventions premedicated for activity  --        Vital Signs    Pulse 80   NSR 81   NR     Heart Rate Source Monitor Monitor     Resp 18 (!)  44     SpO2 100 % 97 %     Patient Activity At rest Walking     Oxygen Therapy Supplemental oxygen Supplemental oxygen     O2 Delivery Method High flow nasal cannula High flow nasal cannula     O2 Flow Rate (L/min) 10 L/min 10 L/min      /65 (!)  142/97     MAP (mmHg) 79 mmHg 105 mmHg     BP Location Other (Comment)   R radial A-line Other (Comment)   R radial A-line     BP Method Automatic Automatic     Patient Position Sitting Sitting           Prior Living Environment      Most Recent Value   Lives With  spouse   Living Arrangements  house   Living Environment Comment  1SH, 1step to enter, tub shower w/grab bar   Equipment Currently Used at Home  none          Prior Level of Function      Most Recent Value   Ambulation  independent   Transferring  independent   Toileting  independent   Bathing  independent   Dressing  independent   Eating  independent   Equipment Currently Used at Home  none   Prior Functional Level Comment  ind w/o AD w/func mob for community distances.                 PT Treatment Summary - 07/11/19 1431        Session Details    Document Type daily treatment    Mode of Treatment individual therapy;physical therapy       Time Calculation    Start Time 1431    Stop Time 1502    Time Calculation (min) 31 min       Bed Mobility    [place], Roll Left close supervision    [place], Sit to Supine minimum assist (75% patient effort);2 person assist       Sit to Stand Transfer    [place], Sit to Stand Transfer close supervision    Comment see note       Stand to Sit Transfer    [place], Stand to Sit Transfer close supervision    Comment see note       Gait Training    [place], Gait minimum assist (75% or more patient effort)    Assistive Device other (see comments)    Distance in Feet 83 feet    Comment see note       Stairs Training    [place], Stairs not tested       LE Seated Therapeutic Exercise    Comment see note       LE Standing Therapeutic Exercise    Comment see note       AM-PAC (TM) - Mobility (Current Function)    Turning from your back to your side while in a flat bed without using bedrails? 3 - A Little    Moving from lying on your back to sitting on the side of a flat bed without using  bedrails? 3 - A Little    Moving to and from a bed to a chair? 3 - A Little    Standing up from a chair using your arms? 3 - A Little    To walk in a hospital room? 3 - A Little    Climbing 3-5 steps with a railing? 3 - A Little    AM-PAC (TM) Mobility Score 18       PT Clinical Impression    Plan For Care Reviewed: Physical Therapy patient voices agreement with PT plan for care    Functional Limitations in Following Categories (PT Eval) home management;work;community/leisure    Rehab Potential/Prognosis good, to achieve stated therapy goals    PT Frequency of Treatment 5-7 times per week    Anticipated Equipment Needs at Discharge none    PT Recommended Discharge Disposition home with assist    Daily Outcome Statement Pt transfering with close supervision, ambulating in room but limited by multiple lines.  Anticipate return to home with spouse to support.          Pain Assessment/Intervention  Pain Charting Type: Pain Reassessment             Education provided this session. See the Patient Education summary report for full details.    Patient instructed in sternal precautions.    Patient educated in safe transfer, ambulation technique.  Patient demonstrated good understanding.    PT Care Plan Goals      Most Recent Value   Stair Goal, PT   PT STG: Stairs  supervision required   PT STG: Number of Stairs  2      PT Care Plan Goals      Most Recent Value   Bed Mobility Goal   Date Goal Established: Bed Mobility  07/10/19   Time to Achieve Goal: Bed Mobility  5 - 7 days   Goal Activity: Bed Mobility  all bed mobility activities   Level of [place] Goal: Bed Mobility  independent   Gait Goal   Date Goal Established: Gait Training  07/10/19   Time to Achieve Goal: Gait Training  5 - 7 days   Level of [place]  independent   Distance Goal: Gait Training (feet)  250 feet   Goal: Gait Training  amb w/least AD    Transfer Goal   Date Goal Established: Transfer Training  07/10/19   Time to Achieve Goal: Transfer  Training  5 - 7 days   Goal Activity: Transfer Training  bed to chair/chair to bed, sit to stand/stand to sit   Level of [place] Goal: Transfer Training  independent

## 2019-07-11 NOTE — PLAN OF CARE
Problem: Patient Care Overview  Goal: Plan of Care Review  Outcome: Ongoing (interventions implemented as appropriate)   07/11/19 1524   Coping/Psychosocial   Plan Of Care Reviewed With patient   Plan of Care Review   Progress progress toward functional goals as expected   Outcome Summary Pt progressing toward goals set by PT in POC.     Goal: Individualization & Mutuality  Outcome: Ongoing (interventions implemented as appropriate)      Problem: Cardiac Surgery (Adult)  Goal: Signs and Symptoms of Listed Potential Problems Will be Absent, Minimized or Managed (Cardiac Surgery)  Outcome: Ongoing (interventions implemented as appropriate)

## 2019-07-11 NOTE — PROCEDURES
Patient noted to have large right pneumothorax post resection of fibroelastoma.  Consent obtained prior to procedure from patient, timeout called immediately prior to procedure.  Moderate sedation administered by Dr. Wyatt.  Patient prepped and draped in usual sterile fashion.  24Fr straight chest tube placed in right 4th intercostal space with return of air upon insertion.  No immediate complications noted.  Post procedure CXR with resolution of pneumothorax.

## 2019-07-11 NOTE — PROGRESS NOTES
Cardiothoracic Intensivist Progress Note       CARDIOTHORACIC   Post-Operative Day # 2     Subjective     History of Present Illness: Saad Hillman is a 74 y.o. 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                              Review of Systems:                                     Constitutional: no acute distress                                                  Eyes: denies difficulty with vision  Ears, nose, mouth, throat, and face: denies oral discomfort, + [place]                                        Respiratory: mild shortness of breath                                  Cardiovascular: mild chest discomfort at incision site                                  Gastrointestinal: denies abdominal pain                                    Genitourinary: denies difficulty voiding                                      Hematologic: denies bleeding issues                                Musculoskeletal: denies muscle pain                                      Neurological: generalized weakness                                   Integumentary: denies rash    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       Surgical History:   Past Surgical History:   Procedure Laterality Date   • FINGER SURGERY      s/p traumatic amputation- had repaired    • HERNIA REPAIR  2012    left inguinal hernia repair    • LIPOMA RESECTION  1999    right shoulder x 2 and back of neck    • NASAL POLYP EXCISION  1966- 1999     x 4    • TONSILLECTOMY  1948   • TRANSURETHRAL RESECTION OF PROSTATE  2017   • VASECTOMY  1980's       Allergies: Aspirin and Hydrocodone-acetaminophen    Social History:   Social History     Social History   • Marital status:      Spouse name: Umu    • Number of children: 2   • Years of education: N/A     Social History Main Topics   • Smoking status: Never Smoker   • Smokeless tobacco: Never Used   • Alcohol use Yes      Comment: occasional    • Drug use: No   • Sexual activity: Not Asked     Other Topics Concern   • None     Social History Narrative   • None       Family History:   Family History   Problem Relation Age of Onset   • Abdominal Aortic Aneurysm Mother    • Hyperlipidemia Mother    • Stroke Father        Objective     Vital signs in last 24 hours:  Temp:  [36.9 °C (98.5 °F)-37.2 °C (98.9 °F)] 36.9 °C (98.5 °F)  Heart Rate:  [60-81] 71  Resp:  [12-40] 14  BP: ()/(39-66) 126/66      Intake/Output Summary (Last 24 hours) at 07/11/19 1000  Last data filed at 07/11/19 0600   Gross per 24 hour   Intake           1211.1 ml   Output             1135 ml   Net             76.1 ml     Intake/Output this shift:  No intake/output data recorded.    Labs  Lab Results   Component Value Date    WBC 8.65 07/11/2019    HGB 9.5 (L) 07/11/2019    HCT 29.1 (L) 07/11/2019     07/11/2019    ALT 21 06/25/2019    AST 19 06/25/2019     07/11/2019    K 4.0 07/11/2019     07/11/2019    CREATININE 0.9 07/11/2019    BUN 17 07/11/2019    CO2 23 07/11/2019    MG 2.3 07/11/2019    PHOS 2.6 07/11/2019    TSH 1.04 07/02/2019    INR 1.3 07/09/2019    HGBA1C 5.4 07/02/2019    PT 15.7 (H) 07/09/2019    PTT 47 (H) 07/09/2019       Full Code    Head/Ear/Nose/Throat:     NCAT.                               Eyes:     EOMI and PERRL.                     Respiratory:     diminished at the bases b/l.               Cardiovascular:     RRR and normal S1, S2.              Gastrointestinal:     + Bowel sounds and  non-tender.                 Genitourinary:     No deformities.                    Extremities:     trace edema b/l lower extremities.            Musculoskeletal:      Right index finger amputation.                   Neurological:     follows commands.       Behavior/Emotional:     appropriate and cooperative.                           Lymph:      No adenopathy noted.                               Skin:      Clean, dry and intact.     Examination of the patient performed at the bedside. Rounded with ICU team and discussed management/plan with surgical staff. Medications, radiological studies and labs reviewed and discussed with attending of record, Dr. Palomo Mart.    Cardiothoracic Assessment and Plan:    Neurologic: A&Ox3 and pain controlled.     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 Still on vasoactive medication for cardiac and vascular support. Hypotension likely related to combination of hypovolemia and myocardial stunning. Will resuscitate, monitor MVO2/CO, wean vasoactive medication and continue to optimize cardiac medications. Continue statin for dyslipidemia.      Respiratory: I personally reviewed the CXR which portrayed b/l pulmonary congestion, increase in right pneumothorax and small lung volumes. Chest tube placed and has now resolved. Will encourage IS, mobilization and wean O2 PRN. Continue asthma medication.     Genitourinary: No braden. Making adequate urine output. Will avoid nephrotoxic medications.     Endocrine: Follow [facility].     Hematologic: No evidence active bleeding. Lovenox for DVT ppx. Acute (expected) blood loss anemia from surgery.      Infectious Disease: No indication for antibiotics at this time.     Fluids, Electrolytes: Electrolytes replaced per protocol.    Gastrointestinal: PO as tolerated. GI ppx.     Skin: OOB, PT.      Tubes, lines and drains: Will remove superfluous invasive monitors  PRN.    Disposition: Critically ill.  Vasoactive support s/p major invasive cardiac surgery. Continue ICU care.     I personally spent 35 minutes of critical care time with this patient not including procedure time.          John Wyatt,   7/11/2019

## 2019-07-11 NOTE — PROCEDURES
Procedure: Arterial Line. Indication: Blood pressure control and blood gas measurements. Time out at 1640. End time 1700. Left wrist was prepped and draped in a sterile fashion. 2 mL of 1% lidocaine was injected subcutaneously. Under ultrasound guidance, a 20 gauge needle was inserted into the radial artery. After positive blood return a guidewire was inserted through the needle. The needle was removed and replaced with a 20 gauge angiocatheter. The guidewire was removed and angiocatheter connected to arterial line tubing. A positive arterial waveform was visualized on the monitor. Distal perfusion checked and present. Catheter secured with 2.0 silk suture and tegaderm. 5 mL blood loss. Uncomplicated procedure.

## 2019-07-12 ENCOUNTER — APPOINTMENT (INPATIENT)
Dept: RADIOLOGY | Facility: HOSPITAL | Age: 74
DRG: 229 | End: 2019-07-12
Attending: NURSE PRACTITIONER
Payer: COMMERCIAL

## 2019-07-12 ENCOUNTER — APPOINTMENT (INPATIENT)
Dept: RADIOLOGY | Facility: HOSPITAL | Age: 74
DRG: 229 | End: 2019-07-12
Attending: THORACIC SURGERY (CARDIOTHORACIC VASCULAR SURGERY)
Payer: COMMERCIAL

## 2019-07-12 PROBLEM — I48.0 PAROXYSMAL ATRIAL FIBRILLATION (CMS/HCC): Status: ACTIVE | Noted: 2019-07-12

## 2019-07-12 LAB
ANION GAP SERPL CALC-SCNC: 5 MEQ/L (ref 3–15)
ATRIAL RATE: 312
ATRIAL RATE: 86
BASE EXCESS BLDA CALC-SCNC: 0 MEQ/L
BUN SERPL-MCNC: 18 MG/DL (ref 8–20)
CA-I BLD-SCNC: 1.2 MMOL/L (ref 1.15–1.27)
CA-I BLD-SCNC: 1.21 MMOL/L (ref 1.15–1.27)
CALCIUM SERPL-MCNC: 9 MG/DL (ref 8.9–10.3)
CHLORIDE SERPL-SCNC: 104 MEQ/L (ref 98–109)
CO2 BLDA-SCNC: 24 MEQ/L (ref 22–32)
CO2 SERPL-SCNC: 23 MEQ/L (ref 22–32)
CREAT SERPL-MCNC: 0.9 MG/DL
ERYTHROCYTE [DISTWIDTH] IN BLOOD BY AUTOMATED COUNT: 13.3 % (ref 11.6–14.4)
GFR SERPL CREATININE-BSD FRML MDRD: >60 ML/MIN/1.73M*2
GLUCOSE BLDA-MCNC: 103 MG/DL (ref 65–95)
GLUCOSE SERPL-MCNC: 119 MG/DL (ref 70–99)
HCO3 BLDA-SCNC: 23 MEQ/L (ref 21–28)
HCT VFR BLDA CALC: 31 % (ref 36–48)
HCT VFR BLDCO AUTO: 29 %
HGB BLD-MCNC: 9.6 G/DL
INR PPP: 1.2 INR
LACTATE BLDA-SCNC: 0.8 MMOL/L (ref 0.4–1.6)
MAGNESIUM SERPL-MCNC: 2 MG/DL (ref 1.8–2.5)
MCH RBC QN AUTO: 30.2 PG (ref 28–33.2)
MCHC RBC AUTO-ENTMCNC: 33.1 G/DL (ref 32.2–36.5)
MCV RBC AUTO: 91.2 FL (ref 83–98)
PCO2 BLDA: 32 MM HG (ref 35–48)
PDW BLD AUTO: 10.6 FL (ref 9.4–12.4)
PH BLDA: 7.47 PH (ref 7.35–7.45)
PLATELET # BLD AUTO: 194 K/UL
PO2 BLDA: 82 MM HG (ref 83–100)
POCT ACT-HR: 464 SEC (ref 100–140)
POCT HEP TEST CONC: 2.5 MG/KG
POCT PATIENT TEMPERATURE: 98.6 °F (ref 97–99)
POCT PROTAMINE DOSE: 238 MG
POCT PROTOCOL HEP CONC: 3.1 MG/KG
POCT TEST (BLD GAS): ABNORMAL
POCT TEST: ABNORMAL
POCT TOTAL HEPARIN REQD: 6129 UNITS
POTASSIUM BLDA-SCNC: 4.1 MEQ/L (ref 3.4–4.5)
POTASSIUM SERPL-SCNC: 4.8 MEQ/L (ref 3.6–5.1)
PROTHROMBIN TIME: 14.6 SEC (ref 12.2–14.5)
QRS DURATION: 124
QRS DURATION: 88
QT INTERVAL: 402
QT INTERVAL: 436
QTC CALCULATION(BAZETT): 446
QTC CALCULATION(BAZETT): 493
R AXIS: -37
R AXIS: 41
RBC # BLD AUTO: 3.18 M/UL (ref 4.5–5.8)
SAO2 % BLDA: 97 % (ref 93–98)
SODIUM SERPL-SCNC: 132 MEQ/L (ref 136–144)
T WAVE AXIS: 102
T WAVE AXIS: 62
VENTRICULAR RATE: 74
VENTRICULAR RATE: 77
WBC # BLD AUTO: 6.9 K/UL

## 2019-07-12 PROCEDURE — 63700000 HC SELF-ADMINISTRABLE DRUG: Performed by: NURSE PRACTITIONER

## 2019-07-12 PROCEDURE — 85610 PROTHROMBIN TIME: CPT | Performed by: THORACIC SURGERY (CARDIOTHORACIC VASCULAR SURGERY)

## 2019-07-12 PROCEDURE — 02HV33Z INSERTION OF INFUSION DEVICE INTO SUPERIOR VENA CAVA, PERCUTANEOUS APPROACH: ICD-10-PCS | Performed by: THORACIC SURGERY (CARDIOTHORACIC VASCULAR SURGERY)

## 2019-07-12 PROCEDURE — 93010 ELECTROCARDIOGRAM REPORT: CPT | Performed by: INTERNAL MEDICINE

## 2019-07-12 PROCEDURE — 63700000 HC SELF-ADMINISTRABLE DRUG: Performed by: PHYSICIAN ASSISTANT

## 2019-07-12 PROCEDURE — 99233 SBSQ HOSP IP/OBS HIGH 50: CPT | Performed by: ANESTHESIOLOGY

## 2019-07-12 PROCEDURE — 76937 US GUIDE VASCULAR ACCESS: CPT

## 2019-07-12 PROCEDURE — 20000000 HC ROOM AND CARE ICU

## 2019-07-12 PROCEDURE — 93010 ELECTROCARDIOGRAM REPORT: CPT | Mod: 77 | Performed by: INTERNAL MEDICINE

## 2019-07-12 PROCEDURE — 71045 X-RAY EXAM CHEST 1 VIEW: CPT

## 2019-07-12 PROCEDURE — 99233 SBSQ HOSP IP/OBS HIGH 50: CPT | Performed by: INTERNAL MEDICINE

## 2019-07-12 PROCEDURE — 93005 ELECTROCARDIOGRAM TRACING: CPT | Performed by: THORACIC SURGERY (CARDIOTHORACIC VASCULAR SURGERY)

## 2019-07-12 PROCEDURE — 82330 ASSAY OF CALCIUM: CPT | Performed by: PHYSICIAN ASSISTANT

## 2019-07-12 PROCEDURE — 97116 GAIT TRAINING THERAPY: CPT | Mod: GP

## 2019-07-12 PROCEDURE — 80048 BASIC METABOLIC PNL TOTAL CA: CPT | Performed by: THORACIC SURGERY (CARDIOTHORACIC VASCULAR SURGERY)

## 2019-07-12 PROCEDURE — 83735 ASSAY OF MAGNESIUM: CPT | Performed by: THORACIC SURGERY (CARDIOTHORACIC VASCULAR SURGERY)

## 2019-07-12 PROCEDURE — 63600000 HC DRUGS/DETAIL CODE: Performed by: NURSE PRACTITIONER

## 2019-07-12 PROCEDURE — 36569 INSJ PICC 5 YR+ W/O IMAGING: CPT

## 2019-07-12 PROCEDURE — 36415 COLL VENOUS BLD VENIPUNCTURE: CPT | Performed by: PHYSICIAN ASSISTANT

## 2019-07-12 PROCEDURE — 94640 AIRWAY INHALATION TREATMENT: CPT

## 2019-07-12 PROCEDURE — C1751 CATH, INF, PER/CENT/MIDLINE: HCPCS

## 2019-07-12 PROCEDURE — 85027 COMPLETE CBC AUTOMATED: CPT | Performed by: THORACIC SURGERY (CARDIOTHORACIC VASCULAR SURGERY)

## 2019-07-12 RX ORDER — AMIODARONE HYDROCHLORIDE 200 MG/1
400 TABLET ORAL 2 TIMES DAILY
Status: DISCONTINUED | OUTPATIENT
Start: 2019-07-12 | End: 2019-07-14

## 2019-07-12 RX ORDER — SODIUM CHLORIDE 0.9 % (FLUSH) 0.9 %
10 SYRINGE (ML) INJECTION
Status: DISCONTINUED | OUTPATIENT
Start: 2019-07-12 | End: 2019-07-15 | Stop reason: HOSPADM

## 2019-07-12 RX ORDER — SODIUM CHLORIDE 0.9 % (FLUSH) 0.9 %
10 SYRINGE (ML) INJECTION AS NEEDED
Status: DISCONTINUED | OUTPATIENT
Start: 2019-07-12 | End: 2019-07-15 | Stop reason: HOSPADM

## 2019-07-12 RX ADMIN — AMIODARONE HYDROCHLORIDE 400 MG: 200 TABLET ORAL at 20:58

## 2019-07-12 RX ADMIN — ENOXAPARIN SODIUM 40 MG: 100 INJECTION SUBCUTANEOUS at 18:10

## 2019-07-12 RX ADMIN — Medication 10 ML: at 18:11

## 2019-07-12 RX ADMIN — FUROSEMIDE 40 MG: 10 INJECTION, SOLUTION INTRAMUSCULAR; INTRAVENOUS at 18:10

## 2019-07-12 RX ADMIN — COLCHICINE 0.6 MG: 0.6 TABLET, FILM COATED ORAL at 09:51

## 2019-07-12 RX ADMIN — TAMSULOSIN HYDROCHLORIDE 0.4 MG: 0.4 CAPSULE ORAL at 09:51

## 2019-07-12 RX ADMIN — ACETAMINOPHEN 975 MG: 325 TABLET, FILM COATED ORAL at 06:14

## 2019-07-12 RX ADMIN — FLUTICASONE FUROATE AND VILANTEROL TRIFENATATE 1 PUFF: 100; 25 POWDER RESPIRATORY (INHALATION) at 08:25

## 2019-07-12 RX ADMIN — MONTELUKAST SODIUM 10 MG: 10 TABLET, FILM COATED ORAL at 21:05

## 2019-07-12 RX ADMIN — FAMOTIDINE 20 MG: 20 TABLET ORAL at 09:51

## 2019-07-12 RX ADMIN — Medication 400 MG: at 09:51

## 2019-07-12 RX ADMIN — ACETAMINOPHEN 975 MG: 325 TABLET, FILM COATED ORAL at 18:10

## 2019-07-12 RX ADMIN — FAMOTIDINE 20 MG: 20 TABLET ORAL at 20:58

## 2019-07-12 RX ADMIN — ROSUVASTATIN CALCIUM 10 MG: 10 TABLET, FILM COATED ORAL at 21:03

## 2019-07-12 RX ADMIN — FLUTICASONE PROPIONATE 1 SPRAY: 50 SPRAY, METERED NASAL at 21:07

## 2019-07-12 RX ADMIN — AMIODARONE HYDROCHLORIDE 400 MG: 200 TABLET ORAL at 11:27

## 2019-07-12 RX ADMIN — Medication 400 MG: at 20:58

## 2019-07-12 RX ADMIN — DOCUSATE SODIUM 100 MG: 100 CAPSULE, LIQUID FILLED ORAL at 20:58

## 2019-07-12 RX ADMIN — FUROSEMIDE 40 MG: 10 INJECTION, SOLUTION INTRAMUSCULAR; INTRAVENOUS at 09:51

## 2019-07-12 RX ADMIN — ACETAMINOPHEN 975 MG: 325 TABLET, FILM COATED ORAL at 11:27

## 2019-07-12 RX ADMIN — DOCUSATE SODIUM 100 MG: 100 CAPSULE, LIQUID FILLED ORAL at 09:51

## 2019-07-12 RX ADMIN — CHLORHEXIDINE GLUCONATE 15 ML: 1.2 RINSE ORAL at 09:51

## 2019-07-12 RX ADMIN — POTASSIUM CHLORIDE 20 MEQ: 750 TABLET, FILM COATED, EXTENDED RELEASE ORAL at 09:51

## 2019-07-12 ASSESSMENT — COGNITIVE AND FUNCTIONAL STATUS - GENERAL
STANDING UP FROM CHAIR USING ARMS: 3 - A LITTLE
MOVING TO AND FROM BED TO CHAIR: 3 - A LITTLE
WALKING IN HOSPITAL ROOM: 3 - A LITTLE
CLIMB 3 TO 5 STEPS WITH RAILING: 3 - A LITTLE

## 2019-07-12 NOTE — PROGRESS NOTES
Cardiothoracic Intensivist Progress Note       CARDIOTHORACIC   Post-Operative Day # 3     Subjective     History of Present Illness: Saad Hillman is a 74 y.o. 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                              Review of Systems:                                     Constitutional: no acute distress                                                  Eyes: denies difficulty with vision  Ears, nose, mouth, throat, and face: denies oral discomfort, + [place]                                        Respiratory: mild shortness of breath                                  Cardiovascular: mild chest discomfort at incision site                                  Gastrointestinal: denies abdominal pain                                    Genitourinary: denies difficulty voiding                                      Hematologic: denies bleeding issues                                Musculoskeletal: denies muscle pain                                      Neurological: generalized weakness                                   Integumentary: denies rash    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       Surgical History:   Past Surgical History:   Procedure Laterality Date   • FINGER SURGERY      s/p traumatic amputation- had repaired    • HERNIA REPAIR  2012    left inguinal hernia repair    • LIPOMA RESECTION  1999    right shoulder x 2 and back of neck    • NASAL POLYP EXCISION  1966- 1999     x 4    • TONSILLECTOMY  1948   • TRANSURETHRAL RESECTION OF PROSTATE  2017   • VASECTOMY  1980's       Allergies: Aspirin and Hydrocodone-acetaminophen    Social History:   Social History     Social History   • Marital status:      Spouse name: Umu    • Number of children: 2   • Years of education: N/A     Social History Main Topics   • Smoking status: Never Smoker   • Smokeless tobacco: Never Used   • Alcohol use Yes      Comment: occasional    • Drug use: No   • Sexual activity: Not Asked     Other Topics Concern   • None     Social History Narrative   • None       Family History:   Family History   Problem Relation Age of Onset   • Abdominal Aortic Aneurysm Mother    • Hyperlipidemia Mother    • Stroke Father        Objective     Vital signs in last 24 hours:  Temp:  [37.1 °C (98.8 °F)-37.2 °C (98.9 °F)] 37.2 °C (98.9 °F)  Heart Rate:  [57-81] 69  Resp:  [13-44] 18  BP: ()/(57-97) 132/69      Intake/Output Summary (Last 24 hours) at 07/12/19 1208  Last data filed at 07/12/19 0600   Gross per 24 hour   Intake           904.89 ml   Output             1300 ml   Net          -395.11 ml     Intake/Output this shift:  No intake/output data recorded.    Labs  Lab Results   Component Value Date    WBC 6.90 07/12/2019    HGB 9.6 (L) 07/12/2019    HCT 29.0 (L) 07/12/2019     07/12/2019    ALT 21 06/25/2019    AST 19 06/25/2019     (L) 07/12/2019    K 4.8 07/12/2019     07/12/2019    CREATININE 0.9 07/12/2019    BUN 18 07/12/2019    CO2 23 07/12/2019    MG 2.0 07/12/2019    PHOS 2.6 07/11/2019    TSH 1.04 07/02/2019    INR 1.2 07/12/2019    HGBA1C 5.4 07/02/2019    PT 14.6 (H) 07/12/2019    PTT 47 (H) 07/09/2019       Full Code    Head/Ear/Nose/Throat:     NCAT.                               Eyes:     EOMI and PERRL.                     Respiratory:     diminished at the bases b/l.               Cardiovascular:     RRR and normal S1, S2.              Gastrointestinal:     + Bowel sounds  and non-tender.                 Genitourinary:     No deformities.                    Extremities:     trace edema b/l lower extremities.            Musculoskeletal:      Right index finger amputation.                   Neurological:     follows commands.       Behavior/Emotional:     appropriate and cooperative.                           Lymph:      No adenopathy noted.                               Skin:      Clean, dry and intact.     Examination of the patient performed at the bedside. Rounded with ICU team and discussed management/plan with surgical staff. Medications, radiological studies and labs reviewed and discussed with attending of record, Dr. Palomo Mart.    Cardiothoracic Assessment and Plan:    Neurologic: A&Ox3 and pain controlled.     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 Still on dobutamine for cardiac support; myocardial stunning. Will resuscitate, monitor MVO2/CO, wean vasoactive medication and continue to optimize cardiac medications. Continue statin for dyslipidemia.      Respiratory: I personally reviewed the CXR which portrayed b/l pulmonary congestion, residual right apical pneumothorax (s/p CT placement) and small lung volumes. Will encourage IS, mobilization and wean O2 PRN. Continue asthma medication.     Genitourinary: No braden. Making adequate urine output. Will avoid nephrotoxic medications.     Endocrine: Follow [facility].     Hematologic: No evidence active bleeding. Lovenox for DVT ppx. Acute (expected) blood loss anemia from surgery.      Infectious Disease: No indication for antibiotics at this time.     Fluids, Electrolytes: Electrolytes replaced per protocol.    Gastrointestinal: PO as tolerated. GI ppx.     Skin: OOB, PT.      Tubes, lines and drains: Will remove superfluous invasive monitors PRN.    Disposition: Guarded.  Chronotropic support s/p major invasive cardiac surgery. Continue ICU care.          John Wyatt, DO  7/12/2019

## 2019-07-12 NOTE — ASSESSMENT & PLAN NOTE
He developed atrial fibrillation with rapid ventricular response on 7/12/2019.  He was started on amiodarone 400 mg twice daily and converted to sinus rhythm on the evening of 7/12/2019.  He remains in sinus rhythm this morning with rates in the 70s.  Continue amiodarone to 400 mg daily and watch sinus node function, plan to decrease to 200 mg daily on discharge for a short course.  If he develops recurrent atrial fibrillation, consider anticoagulation.  Please call with questions.

## 2019-07-12 NOTE — PROGRESS NOTES
Patient: Saad Hillman  Location: [address] [address]  MRN: [id number]  Today's date: 7/12/2019    Pt left in bed with call bell and personal items within reach. RN informed of session completed.     Hospital Course  Michael is a 74 y.o. 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    Michael has a past medical history of Arthritis; Asthma; Atrial myxoma; BPH (benign prostatic hyperplasia); Hypertension; Lipid disorder; Medial meniscus tear; Seasonal allergies; and Traumatic amputation of finger (2015).     Admit for papillary fibroelastoma removal> +sternotomy           Therapy Pain/Vitals - 07/12/19 4209        Pain/Comfort/Sleep    Pain Charting Type Pain Assessment    Presence of Pain complains of pain/discomfort    Preferred Pain Scale word (verbal rating pain scale)    Pain Body Location - Orientation generalized    Pain Body Location chest    Pain Rating: Rest 4 - moderate pain    Pain Rating: Activity 4 - moderate pain    Pain Management Interventions position adjusted       Vital Signs    Pulse (!)  121    SpO2 99 %    Patient Activity At rest    Oxygen Therapy Supplemental oxygen    O2 Delivery Method Nasal cannula    O2 Flow Rate (L/min) 6 L/min          Prior Living Environment      Most Recent Value   Lives With  spouse   Living Arrangements  house   Living Environment Comment  1SH, 1step to enter, tub shower w/grab bar   Equipment Currently Used at Home  none          Prior Level of Function      Most Recent Value   Ambulation  independent   Transferring  independent   Toileting  independent   Bathing  independent   Dressing  independent   Eating  independent   Equipment Currently Used at Home  none   Prior Functional Level Comment  ind w/o AD w/func mob for community distances.                 PT Treatment Summary - 07/12/19 8119        Session Details    Document Type daily  treatment    Mode of Treatment physical therapy       Time Calculation    Start Time 1355    Stop Time 1408    Time Calculation (min) 13 min       General Information    Patient Profile Reviewed? yes    Existing Precautions/Restrictions fall;cardiac    Limitations/Impairments safety/cognitive       Cognition/Psychosocial    Safety Awareness intact       Bed Mobility    [place], Sit to Supine minimum assist (75% patient effort);2 person assist    Comment (Bed Mobility) pt prefers no pillows with HOB elevated during tx       Sit to Stand Transfer    [place], Sit to Stand Transfer close supervision       Stand to Sit Transfer    [place], Stand to Sit Transfer close supervision       Gait Training    [place], Gait minimum assist (75% or more patient effort)    Distance in Feet 4 feet    Gait Pattern Utilized step-to    Gait Deviations Identified decreased gait speed;decreased step length    Comment chiar>bed tx performed, limited by lines/tubes       AM-PAC (TM) - Mobility (Current Function)    Turning from your back to your side while in a flat bed without using bedrails? 4 - None    Moving from lying on your back to sitting on the side of a flat bed without using bedrails? 3 - A Little    Moving to and from a bed to a chair? 3 - A Little    Standing up from a chair using your arms? 3 - A Little    To walk in a hospital room? 3 - A Little    Climbing 3-5 steps with a railing? 3 - A Little    AM-PAC (TM) Mobility Score 19       PT Clinical Impression    Plan For Care Reviewed: Physical Therapy PT plan for care discussed with patient    System Pathology/Pathophysiology Noted musculoskeletal;cardiovascular    Impairments Found (PT Eval) aerobic capacity/endurance;ergonomics and body mechanics;gait, locomotion, and balance    Functional Limitations in Following Categories (PT Eval) self-care;home management    Rehab Potential/Prognosis good, to achieve stated therapy goals    PT Frequency of Treatment  5-7 times per week    Problem List decreased strength;impaired balance    Activity Limitations Related to Problem List ambulation not performed safely;functional mobility not performed adequately or safely for household activity    Anticipated Equipment Needs at Discharge none    PT Recommended Discharge Disposition home with assist    Daily Outcome Statement limited session 2* lines/tubes and plans for PICC; supervision/min assist, anticipate progress, rec home with assist           Pain Assessment/Intervention  Pain Charting Type: Pain Assessment             Education provided this session. See the Patient Education summary report for full details.    PT Care Plan Goals      Most Recent Value   Stair Goal, PT   PT STG: Stairs  supervision required   PT STG: Number of Stairs  2      PT Care Plan Goals      Most Recent Value   Bed Mobility Goal   Date Goal Established: Bed Mobility  07/10/19   Time to Achieve Goal: Bed Mobility  5 - 7 days   Goal Activity: Bed Mobility  all bed mobility activities   Level of [place] Goal: Bed Mobility  independent   Gait Goal   Date Goal Established: Gait Training  07/10/19   Time to Achieve Goal: Gait Training  5 - 7 days   Level of [place]  independent   Distance Goal: Gait Training (feet)  250 feet   Goal: Gait Training  amb w/least AD    Transfer Goal   Date Goal Established: Transfer Training  07/10/19   Time to Achieve Goal: Transfer Training  5 - 7 days   Goal Activity: Transfer Training  bed to chair/chair to bed, sit to stand/stand to sit   Level of [place] Goal: Transfer Training  independent

## 2019-07-12 NOTE — PROGRESS NOTES
Electrophysiology Progress Note    SUBJECTIVE  Mr Hillman feels well this am. He states that he has been able to get out of bed and onto his chair. He denies palpitations, tachycardia, lightheadedness, dyspnea.    Inpatient medications:  •  acetaminophen, 975 mg, oral, q6h CATHY  •  albumin human, 500 mL, intravenous, PRN  •  albuterol, 2.5 mg, nebulization, q6h PRN  •  alum-mag hydroxide-simeth, 30 mL, oral, q4h PRN  •  amiodarone, 150 mg, intravenous, Once  •  amiodarone, 400 mg, oral, BID  •  bisacodyl, 10 mg, rectal, PRN  •  calcium chloride, 1 g, intravenous, q6h PRN  •  chlorhexidine, 15 mL, Mouth/Throat, 2 times per day  •  colchicine, 0.6 mg, oral, Daily  •  insulin, 0-15 Units/hr, intravenous, Titrated **AND** dextrose in water, 10-30 mL, intravenous, PRN  •  DOBUTamine, 1-5 mcg/kg/min, intravenous, Continuous  •  docusate sodium, 100 mg, oral, BID  •  enoxaparin, 40 mg, subcutaneous, Daily (6p)  •  famotidine, 20 mg, intravenous, BID **OR** famotidine, 20 mg, oral, BID  •  fluticasone furoate-vilanterol, 1 puff, inhalation, Daily  •  fluticasone propionate, 1 spray, Each Nostril, Nightly  •  furosemide, 40 mg, intravenous, BID (9a, 4p)  •  oxyCODONE, 5-10 mg, oral, q4h PRN **OR** HYDROmorphone, 0.25-0.5 mg, intravenous, q1h PRN  •  ketorolac, 15 mg, intravenous, q6h PRN  •  magnesium oxide, 400 mg, oral, BID  •  magnesium sulfate, 2 g, intravenous, PRN  •  montelukast, 10 mg, oral, Nightly  •  niCARdipine, 2.5-15 mg/hr, intravenous, Titrated  •  norepinephrine, 2-8 mcg/min, intravenous, Titrated  •  ondansetron ODT, 4 mg, oral, q8h PRN **OR** ondansetron, 4 mg, intravenous, q8h PRN  •  potassium chloride, 20 mEq, oral, BID  •  potassium chloride, 20 mEq, intravenous, q8h PRN  •  rosuvastatin, 10 mg, oral, Nightly  •  senna, 1 tablet, oral, 2x daily PRN  •  sodium chloride 0.9 %, , intravenous, Continuous  •  sodium chloride, 10 mL, intravenous, q8h INT  •  sodium chloride, 10 mL, intravenous, PRN  •   tamsulosin, 0.4 mg, oral, Daily    Review of Systems: Aside from HPI, review of systems otherwise normal.    OBJECTIVE:   /69 (BP Location: Left upper arm, Patient Position: Lying)   Pulse 69   Temp 37.2 °C (98.9 °F) (Temporal)   Resp 18   Wt 88 kg (194 lb 0.1 oz)   SpO2 98%   BMI 28.65 kg/m²     Physical Exam   Constitutional: Well-developed and well-nourished. No distress.   Neck: No JVD present.   Chest: Dressings in anterior chest CDI  Cardiovascular: Normal rate and regular rhythm. 1/6 systolic murmur.  Pulmonary: Good bilateral breath sounds, clear lung fields. No crackles, no wheezes  Abdominal: Normal bowel sounds. Soft, non tender, no distension. No rebound or guarding.   Extremities: No LE edema bilaterally. 1+ pulses in hansel LEs   Neurological: Alert and oriented to person, place, and time.   Skin: Skin is warm and dry.   Psychiatric: Normal mood, affect and behavior    Labs    Results from last 7 days  Lab Units 07/12/19  0325 07/11/19  0305 07/10/19  1610  07/10/19  0345   SODIUM mEQ/L 132* 136  --   --  138   POTASSIUM mEQ/L 4.8 4.0 4.1  < > 3.9   CHLORIDE mEQ/L 104 105  --   --  106   CO2 mEQ/L 23 23  --   --  23   BUN mg/dL 18 17  --   --  13   CREATININE mg/dL 0.9 0.9  --   --  0.8   CALCIUM mg/dL 9.0 9.0  --   --  8.9   GLUCOSE mg/dL 119* 114*  --   --  146*   < > = values in this interval not displayed.    Results from last 7 days  Lab Units 07/12/19 0325 07/11/19  0305 07/10/19  0345   WBC K/uL 6.90 8.65 10.11   HEMOGLOBIN g/dL 9.6* 9.5* 10.9*   HEMATOCRIT % 29.0* 29.1* 32.7*   PLATELETS K/uL 194 209 230             Cardiology results  ECG : Accelerated junctional rhythm  Telemetry : Accelerated junctional rhythm, some accelerated ventricular activity and episodes of Afib. Some sinus activity intermittently as well    Imaging: Imaging reviewed.    ASSESSMENT AND PLAN  74 y.o. male admitted for Papillary fibroelastoma [D15.1]  Papillary fibroelastoma of heart [D15.1]. Electrophysiology  is consulted for    Paroxysmal atrial fibrillation (CMS/HCC) (HCC)   Assessment & Plan    Has had short runs of Afib with RVR this am  Would start Amiodarone 400 mg PO BID for now  Continue to monitor closely over night  If persistent Afib, may consider starting AC     Junctional bradycardia   Assessment & Plan    Occurred after resection of fibroelastoma of the mitral valve  Suspect that the sinus node will recover within a couple of days; intermittent sinus conduction seen on telemetry now  Transvenous pacer in place; may pace if needed, however has had an adequate junctional/intraventricular escape so far  If patient becomes profoundly bradycardic, may benefit from keeping low dose Dobutamine/Dopamine to maintain rhythm     Papillary fibroelastoma of heart   Assessment & Plan    S/P minimally invasive surgical resection  Per CT surg           Patient seen and discussed with my attending physician, Dr. Johnson, who agrees with the management plan.    Donald Mark MD  7/12/2019  11:26 AM

## 2019-07-12 NOTE — PROGRESS NOTES
Discussed in CPR. Continues on Dobutamine gtt. THOR 7/16/19. Dhruv at [facility] updated. Will follow to assist with transitions of care.

## 2019-07-13 PROBLEM — I10 HTN (HYPERTENSION): Status: ACTIVE | Noted: 2019-07-13

## 2019-07-13 PROBLEM — E78.5 HYPERLIPEMIA: Status: ACTIVE | Noted: 2019-07-13

## 2019-07-13 LAB
ANION GAP SERPL CALC-SCNC: 7 MEQ/L (ref 3–15)
ATRIAL RATE: 214
BUN SERPL-MCNC: 10 MG/DL (ref 8–20)
CALCIUM SERPL-MCNC: 8.6 MG/DL (ref 8.9–10.3)
CHLORIDE SERPL-SCNC: 101 MEQ/L (ref 98–109)
CO2 SERPL-SCNC: 25 MEQ/L (ref 22–32)
CREAT SERPL-MCNC: 0.7 MG/DL
ERYTHROCYTE [DISTWIDTH] IN BLOOD BY AUTOMATED COUNT: 13 % (ref 11.6–14.4)
GFR SERPL CREATININE-BSD FRML MDRD: >60 ML/MIN/1.73M*2
GLUCOSE BLD-MCNC: 97 MG/DL (ref 70–99)
GLUCOSE BLD-MCNC: 98 MG/DL (ref 70–99)
GLUCOSE BLD-MCNC: 99 MG/DL (ref 70–99)
GLUCOSE SERPL-MCNC: 105 MG/DL (ref 70–99)
HCT VFR BLDCO AUTO: 30.2 %
HGB BLD-MCNC: 9.8 G/DL
MAGNESIUM SERPL-MCNC: 1.9 MG/DL (ref 1.8–2.5)
MCH RBC QN AUTO: 29.5 PG (ref 28–33.2)
MCHC RBC AUTO-ENTMCNC: 32.5 G/DL (ref 32.2–36.5)
MCV RBC AUTO: 91 FL (ref 83–98)
PDW BLD AUTO: 10.6 FL (ref 9.4–12.4)
PLATELET # BLD AUTO: 206 K/UL
POCT TEST: NORMAL
POTASSIUM SERPL-SCNC: 4 MEQ/L (ref 3.6–5.1)
QRS DURATION: 94
QT INTERVAL: 386
QTC CALCULATION(BAZETT): 445
R AXIS: 55
RBC # BLD AUTO: 3.32 M/UL (ref 4.5–5.8)
SODIUM SERPL-SCNC: 133 MEQ/L (ref 136–144)
T WAVE AXIS: 68
VENTRICULAR RATE: 80
WBC # BLD AUTO: 5.95 K/UL

## 2019-07-13 PROCEDURE — 63600000 HC DRUGS/DETAIL CODE: Performed by: NURSE PRACTITIONER

## 2019-07-13 PROCEDURE — 36415 COLL VENOUS BLD VENIPUNCTURE: CPT | Performed by: PHYSICIAN ASSISTANT

## 2019-07-13 PROCEDURE — 83735 ASSAY OF MAGNESIUM: CPT | Performed by: PHYSICIAN ASSISTANT

## 2019-07-13 PROCEDURE — 20000000 HC ROOM AND CARE ICU

## 2019-07-13 PROCEDURE — 97116 GAIT TRAINING THERAPY: CPT | Mod: GP

## 2019-07-13 PROCEDURE — 93010 ELECTROCARDIOGRAM REPORT: CPT | Performed by: INTERNAL MEDICINE

## 2019-07-13 PROCEDURE — 97110 THERAPEUTIC EXERCISES: CPT | Mod: GP

## 2019-07-13 PROCEDURE — 200200 PR NO CHARGE: Performed by: THORACIC SURGERY (CARDIOTHORACIC VASCULAR SURGERY)

## 2019-07-13 PROCEDURE — 80048 BASIC METABOLIC PNL TOTAL CA: CPT | Performed by: PHYSICIAN ASSISTANT

## 2019-07-13 PROCEDURE — 63700000 HC SELF-ADMINISTRABLE DRUG: Performed by: NURSE PRACTITIONER

## 2019-07-13 PROCEDURE — 63700000 HC SELF-ADMINISTRABLE DRUG: Performed by: PHYSICIAN ASSISTANT

## 2019-07-13 PROCEDURE — 99233 SBSQ HOSP IP/OBS HIGH 50: CPT | Performed by: INTERNAL MEDICINE

## 2019-07-13 PROCEDURE — 85027 COMPLETE CBC AUTOMATED: CPT | Performed by: PHYSICIAN ASSISTANT

## 2019-07-13 PROCEDURE — 94640 AIRWAY INHALATION TREATMENT: CPT

## 2019-07-13 PROCEDURE — 63600000 HC DRUGS/DETAIL CODE: Performed by: PHYSICIAN ASSISTANT

## 2019-07-13 RX ADMIN — COLCHICINE 0.6 MG: 0.6 TABLET, FILM COATED ORAL at 08:56

## 2019-07-13 RX ADMIN — FUROSEMIDE 40 MG: 10 INJECTION, SOLUTION INTRAMUSCULAR; INTRAVENOUS at 17:19

## 2019-07-13 RX ADMIN — MAGNESIUM SULFATE 2 G: 2 INJECTION INTRAVENOUS at 04:50

## 2019-07-13 RX ADMIN — POTASSIUM CHLORIDE 20 MEQ: 750 TABLET, FILM COATED, EXTENDED RELEASE ORAL at 08:57

## 2019-07-13 RX ADMIN — TAMSULOSIN HYDROCHLORIDE 0.4 MG: 0.4 CAPSULE ORAL at 08:57

## 2019-07-13 RX ADMIN — AMIODARONE HYDROCHLORIDE 400 MG: 200 TABLET ORAL at 20:09

## 2019-07-13 RX ADMIN — POTASSIUM CHLORIDE 20 MEQ: 14.9 INJECTION, SOLUTION INTRAVENOUS at 06:03

## 2019-07-13 RX ADMIN — ACETAMINOPHEN 975 MG: 325 TABLET, FILM COATED ORAL at 00:12

## 2019-07-13 RX ADMIN — Medication 10 ML: at 20:04

## 2019-07-13 RX ADMIN — ACETAMINOPHEN 975 MG: 325 TABLET, FILM COATED ORAL at 06:00

## 2019-07-13 RX ADMIN — ENOXAPARIN SODIUM 40 MG: 100 INJECTION SUBCUTANEOUS at 17:24

## 2019-07-13 RX ADMIN — Medication 10 ML: at 03:42

## 2019-07-13 RX ADMIN — FAMOTIDINE 20 MG: 20 TABLET ORAL at 08:56

## 2019-07-13 RX ADMIN — DOCUSATE SODIUM 100 MG: 100 CAPSULE, LIQUID FILLED ORAL at 20:08

## 2019-07-13 RX ADMIN — FUROSEMIDE 40 MG: 10 INJECTION, SOLUTION INTRAMUSCULAR; INTRAVENOUS at 08:57

## 2019-07-13 RX ADMIN — FAMOTIDINE 20 MG: 20 TABLET ORAL at 20:08

## 2019-07-13 RX ADMIN — DOCUSATE SODIUM 100 MG: 100 CAPSULE, LIQUID FILLED ORAL at 08:57

## 2019-07-13 RX ADMIN — MONTELUKAST SODIUM 10 MG: 10 TABLET, FILM COATED ORAL at 22:09

## 2019-07-13 RX ADMIN — Medication 400 MG: at 08:57

## 2019-07-13 RX ADMIN — METOPROLOL SUCCINATE 12.5 MG: 25 TABLET, EXTENDED RELEASE ORAL at 22:09

## 2019-07-13 RX ADMIN — ROSUVASTATIN CALCIUM 10 MG: 10 TABLET, FILM COATED ORAL at 22:09

## 2019-07-13 RX ADMIN — Medication 400 MG: at 20:09

## 2019-07-13 RX ADMIN — Medication 10 ML: at 12:42

## 2019-07-13 RX ADMIN — ACETAMINOPHEN 975 MG: 325 TABLET, FILM COATED ORAL at 22:08

## 2019-07-13 RX ADMIN — AMIODARONE HYDROCHLORIDE 400 MG: 200 TABLET ORAL at 08:57

## 2019-07-13 RX ADMIN — FLUTICASONE FUROATE AND VILANTEROL TRIFENATATE 1 PUFF: 100; 25 POWDER RESPIRATORY (INHALATION) at 07:49

## 2019-07-13 RX ADMIN — FLUTICASONE PROPIONATE 1 SPRAY: 50 SPRAY, METERED NASAL at 22:14

## 2019-07-13 RX ADMIN — POTASSIUM CHLORIDE 20 MEQ: 750 TABLET, FILM COATED, EXTENDED RELEASE ORAL at 20:08

## 2019-07-13 ASSESSMENT — COGNITIVE AND FUNCTIONAL STATUS - GENERAL
STANDING UP FROM CHAIR USING ARMS: 4 - NONE
WALKING IN HOSPITAL ROOM: 3 - A LITTLE
MOVING TO AND FROM BED TO CHAIR: 4 - NONE
CLIMB 3 TO 5 STEPS WITH RAILING: 3 - A LITTLE

## 2019-07-13 NOTE — PLAN OF CARE
Problem: Patient Care Overview  Goal: Plan of Care Review  Outcome: Ongoing (interventions implemented as appropriate)   07/13/19 1443   Coping/Psychosocial   Plan Of Care Reviewed With patient   Plan of Care Review   Progress progress toward functional goals as expected   Outcome Summary Pt progressing toward goals set by PT in POC.       Problem: Cardiac Surgery (Adult)  Goal: Signs and Symptoms of Listed Potential Problems Will be Absent, Minimized or Managed (Cardiac Surgery)  Outcome: Ongoing (interventions implemented as appropriate)      Problem: Acute Therapy Services Goal & Intervention Plan  Goal: Transfer Training Goal  Outcome: Ongoing (interventions implemented as appropriate)

## 2019-07-13 NOTE — PLAN OF CARE
Problem: Patient Care Overview  Goal: Plan of Care Review  Outcome: Ongoing (interventions implemented as appropriate)   07/13/19 0240   Coping/Psychosocial   Plan Of Care Reviewed With patient   Plan of Care Review   Progress no change       Problem: Pressure Ulcer Risk (Justo Scale) (Adult,Obstetrics,Pediatric)  Goal: Identify Related Risk Factors and Signs and Symptoms  Outcome: Ongoing (interventions implemented as appropriate)    Goal: Skin Integrity  Outcome: Ongoing (interventions implemented as appropriate)      Problem: Cardiac Surgery (Adult)  Goal: Signs and Symptoms of Listed Potential Problems Will be Absent, Minimized or Managed (Cardiac Surgery)  Outcome: Ongoing (interventions implemented as appropriate)    Goal: Anesthesia/Sedation Recovery  Outcome: Ongoing (interventions implemented as appropriate)      Problem: Fall Risk (Adult)  Goal: Identify Related Risk Factors and Signs and Symptoms  Outcome: Ongoing (interventions implemented as appropriate)    Goal: Absence of Falls  Outcome: Ongoing (interventions implemented as appropriate)

## 2019-07-13 NOTE — PROGRESS NOTES
Patient: Saad Hillman  [facility]  MRN: [id number]  Today's date: 7/13/2019     Patient completed cardiac rehabilitation exercise program:  seated warm-ups for vasodilation x 10 each and cool-downs for venous return x 5 each. Ankle pumps, LAQ, hip flexion, bicep curls, shoulder flexion. Verbal cues for purpose and frequency.  Home exercise program, as above, left on whiteboard.  Patient instructed in frequency and purpose.    Patient performed sit<-->stand at mod I.  Patient instructed to remain at sitting surface for 10-15 seconds, to assess for dizziness before ambulation.  Patient reported good understanding.    Patient ambulated 324 feet with RW and supervision, step-through pattern. Patient instructed to monitor for dyspnea on exertion and to respond with standing breaks as needed.    Patient reported 11/20 using [place]ly Rate of Perceived Exertion Scale.      Pt weaned to RA and performed above session with SpO2 976%, RN aware    Patient left sitting in chair, all stated needs met.  RN aware.        Hospital Course  Michael is a 74 y.o. 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    Michael has a past medical history of Arthritis; Asthma; Atrial myxoma; BPH (benign prostatic hyperplasia); Hypertension; Lipid disorder; Medial meniscus tear; Seasonal allergies; and Traumatic amputation of finger (2015).     Admit for papillary fibroelastoma removal> +sternotomy     pt converted to NSR overnight 7/12          Therapy Pain/Vitals     Row Name 07/13/19 1401 07/13/19 1431       Pain/Comfort/Sleep    Pain Charting Type Pain Reassessment  --    Pain Body Location chest   @ CT site  --    Pain Rating (0-10): Rest 1  --    Pain Rating (0-10): Activity 5  --    Pain Management Interventions premedicated for activity  --       Vital Signs    Pulse 74   NSR 81   NSR    Heart Rate  Source Monitor Monitor    SpO2 97 % 97 %    Patient Activity At rest Walking    Oxygen Therapy Supplemental oxygen None (Room air)    O2 Delivery Method Nasal cannula  --    O2 Flow Rate (L/min) 2 L/min  --    BP (!)  112/59 101/70    MAP (mmHg) 78 mmHg 82 mmHg    BP Location Left upper arm Left upper arm    BP Method Automatic Automatic    Patient Position Sitting Sitting          Prior Living Environment      Most Recent Value   Lives With  spouse   Living Arrangements  house   Living Environment Comment  1SH, 1step to enter, tub shower w/grab bar   Equipment Currently Used at Home  none          Prior Level of Function      Most Recent Value   Ambulation  independent   Transferring  independent   Toileting  independent   Bathing  independent   Dressing  independent   Eating  independent   Equipment Currently Used at Home  none   Prior Functional Level Comment  ind w/o AD w/func mob for community distances.                 PT Treatment Summary - 07/13/19 1401        Session Details    Document Type daily treatment    Mode of Treatment individual therapy;physical therapy       Time Calculation    Start Time 1401    Stop Time 1435    Time Calculation (min) 34 min       General Information    Patient Profile Reviewed? yes    Existing Precautions/Restrictions fall;cardiac       Sit to Stand Transfer    [place], Sit to Stand Transfer modified independence    Comment see note       Stand to Sit Transfer    [place], Stand to Sit Transfer modified independence    Comment see note       Gait Training    [place], Gait supervision    Distance in Feet 324 feet    Gait Pattern Utilized step-to    Comment see note       Stairs Training    [place], Stairs not tested       LE Seated Therapeutic Exercise    Comment see note       AM-PAC (TM) - Mobility (Current Function)    Turning from your back to your side while in a flat bed without using bedrails? 4 - None    Moving from lying on your back to sitting on  the side of a flat bed without using bedrails? 4 - None    Moving to and from a bed to a chair? 4 - None    Standing up from a chair using your arms? 4 - None    To walk in a hospital room? 3 - A Little    Climbing 3-5 steps with a railing? 3 - A Little    AM-PAC (TM) Mobility Score 22       PT Clinical Impression    Plan For Care Reviewed: Physical Therapy patient voices agreement with PT plan for care    Functional Limitations in Following Categories (PT Eval) home management;work;community/leisure    Rehab Potential/Prognosis good, to achieve stated therapy goals    PT Frequency of Treatment 5-7 times per week    Activity Limitations Related to Problem List functional mobility not performed adequately or safely for community activity    Anticipated Equipment Needs at Discharge none    PT Recommended Discharge Disposition home with assist    Daily Outcome Statement Pt transferring at mod I, ambulating household distance with RW at supervision.  Anticipate return to home with spouse to assist.          Pain Assessment/Intervention  Pain Charting Type: Pain Reassessment             Education provided this session. See the Patient Education summary report for full details.    Patient instructed to ambulate to tolerance throughout the day, with staff, during this admission and after discharge.  Patient voiced understanding.    PT Care Plan Goals      Most Recent Value   Stair Goal, PT   PT STG: Stairs  supervision required   PT STG: Number of Stairs  2      PT Care Plan Goals      Most Recent Value   Bed Mobility Goal   Date Goal Established: Bed Mobility  07/10/19   Time to Achieve Goal: Bed Mobility  5 - 7 days   Goal Activity: Bed Mobility  all bed mobility activities   Level of [place] Goal: Bed Mobility  independent   Gait Goal   Date Goal Established: Gait Training  07/10/19   Time to Achieve Goal: Gait Training  5 - 7 days   Level of [place]  independent   Distance Goal: Gait Training (feet)  250 feet    Goal: Gait Training  amb w/least AD    Transfer Goal   Date Goal Established: Transfer Training  07/10/19   Time to Achieve Goal: Transfer Training  5 - 7 days   Goal Activity: Transfer Training  bed to chair/chair to bed, sit to stand/stand to sit   Level of [place] Goal: Transfer Training  independent   Goal Outcome: Transfer Training  goal partially met   Progress: Transfer Training  STS at mod I

## 2019-07-13 NOTE — PROGRESS NOTES
Electrophysiology Progress Note      Subjective   He converted to sinus rhythm last evening.  He feels well this morning and has no complaints.  He denies chest pain, shortness of breath or palpitations.    Inpatient medications:  •  acetaminophen, 975 mg, oral, q6h CATHY  •  albumin human, 500 mL, intravenous, PRN  •  albuterol, 2.5 mg, nebulization, q6h PRN  •  alum-mag hydroxide-simeth, 30 mL, oral, q4h PRN  •  amiodarone, 400 mg, oral, BID  •  calcium chloride, 1 g, intravenous, q6h PRN  •  chlorhexidine, 15 mL, Mouth/Throat, 2 times per day  •  colchicine, 0.6 mg, oral, Daily  •  insulin, 0-15 Units/hr, intravenous, Titrated **AND** dextrose in water, 10-30 mL, intravenous, PRN  •  DOBUTamine, 1-5 mcg/kg/min, intravenous, Continuous  •  docusate sodium, 100 mg, oral, BID  •  enoxaparin, 40 mg, subcutaneous, Daily (6p)  •  famotidine, 20 mg, intravenous, BID **OR** famotidine, 20 mg, oral, BID  •  fluticasone furoate-vilanterol, 1 puff, inhalation, Daily  •  fluticasone propionate, 1 spray, Each Nostril, Nightly  •  furosemide, 40 mg, intravenous, BID (9a, 4p)  •  oxyCODONE, 5-10 mg, oral, q4h PRN **OR** HYDROmorphone, 0.25-0.5 mg, intravenous, q1h PRN  •  ketorolac, 15 mg, intravenous, q6h PRN  •  magnesium oxide, 400 mg, oral, BID  •  magnesium sulfate, 2 g, intravenous, PRN  •  montelukast, 10 mg, oral, Nightly  •  niCARdipine, 2.5-15 mg/hr, intravenous, Titrated  •  ondansetron ODT, 4 mg, oral, q8h PRN **OR** ondansetron, 4 mg, intravenous, q8h PRN  •  potassium chloride, 20 mEq, oral, BID  •  potassium chloride, 20 mEq, intravenous, q8h PRN  •  rosuvastatin, 10 mg, oral, Nightly  •  senna, 1 tablet, oral, 2x daily PRN  •  sodium chloride 0.9 %, , intravenous, Continuous  •  sodium chloride, 10 mL, intravenous, q8h INT  •  sodium chloride, 10 mL, intravenous, PRN  •  tamsulosin, 0.4 mg, oral, Daily    Objective    Vitals:    07/13/19 0800   BP:    Pulse: 60   Resp: 19   Temp: 36.7 °C (98.1 °F)   SpO2: 96%      Physical Exam  General: No acute distress.  HEENT: Anicteric.  Moist mucous membranes.  Neck: Supple, no JVD.  Lungs: Clear to auscultation bilaterally.  Cardiac: Regular rate and rhythm.  Normal S1-S2.  No murmurs rubs or gallops.  Abdomen: Soft, nontender.  Extremities: No lower extremity edema.  Skin: Warm, dry.  Neurologic: Grossly intact.  Psychiatric: Behavior is appropriate and cooperative.       Laboratory results:    Results from last 7 days  Lab Units 07/13/19  0341   SODIUM mEQ/L 133*   POTASSIUM mEQ/L 4.0   CHLORIDE mEQ/L 101   CO2 mEQ/L 25   BUN mg/dL 10   CREATININE mg/dL 0.7*   CALCIUM mg/dL 8.6*   GLUCOSE mg/dL 105*       Results from last 7 days  Lab Units 07/13/19  0341   WBC K/uL 5.95   HEMOGLOBIN g/dL 9.8*   HEMATOCRIT % 30.2*   PLATELETS K/uL 206       Results from last 7 days  Lab Units 07/12/19  0325   INR INR 1.2           Imaging  Cardiac Imaging   ANESTHESIA MAYE 07/09/2019    Narrative Procedure Performed: MAYE     Start Time:        End Time:      Preanesthesia Checklist:  Patient identified, IV assessed, risks and benefits discussed, monitors   and equipment assessed, procedure being performed at surgeon's request and   anesthesia consent obtained.    General Procedure Information  Physician Requesting Echo: JAUN CASTELLANO  Location performed:  OR  Intubated  Bite block placed  Heart visualized  Probe Insertion:  Easy  Probe Type:  Multiplane and transesophageal  Modalities:  Pulse wave Doppler, color flow mapping, 3D and 2D        Anesthesia Information  Performed Personally  Anesthesiologist:  MACIEL CARLOS      Echocardiogram Comments:       Pre:  LV: EF 55%, no RWMA.  RV: normal size and function.  MV: no MR, no MS. There is a 0.7x0.9cm mass attached to the lateral cords   that attach to the AML close to the papillary muscle.   AV: no AS, no AI  TV: trace TR  IAS: no PFO by CFD.  Ao: normal caliber, no dissection.  No pericardial or pleural effusions.    Post:   LV: EF  55%, no RWMA.  RV: normal size and function.  MV: no MR, no MS. No mass remains.   AV: no AS, no AI  TV: trace TR  IAS: no PFO by CFD.  Ao: normal caliber, no dissection.  No pericardial or pleural effusions.  Exam unchanged with chest closure.  MAYE probe removed with clean tip.  Discussed with Dr. Mart.                Telemetry  Sinus rhythm     Paroxysmal atrial fibrillation (CMS/HCC) ([facility])   Assessment & Plan    He developed atrial fibrillation with rapid ventricular response on 7/12/2019.  He was started on amiodarone 400 mg twice daily and converted to sinus rhythm on the evening of 7/12/2019.  He remains in sinus rhythm this morning with rates in the 70s.  Decrease amiodarone to 400 mg daily and continue to watch sinus node function.  If he develops recurrent atrial fibrillation, consider anticoagulation.       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  He has return of sinus node function this morning with sinus rates in the 70s.  There is no indication for pacemaker at this time.     * Papillary fibroelastoma of heart   Assessment & Plan    S/P minimally invasive surgical resection  Per CT surg               Thank you for allowing me to participate in the care of your patient, please feel free to contact me with any questions or concerns.     Tiara Hutchinson MD  7/13/2019

## 2019-07-13 NOTE — ASSESSMENT & PLAN NOTE
Patient status post mini sternotomy with aortotomy for papillary stoma removal of the mitral valve on 7/9/2019  PICC line removed today prior to discharge  Continue amiodarone, Toprol, Crestor  Continue colchicine for PPS prevention  Decreased lasix to 20mg daily with K and mg repletion   Encourage I-S, ambulation  Plan to discharge home today

## 2019-07-13 NOTE — ASSESSMENT & PLAN NOTE
EP consult  Continue p.o. Amiodarone and Toprol XL 25mg BID   Patient currently in sinus rhythm and does not need anticoagulation as only 2 episodes of Atrial Fib

## 2019-07-13 NOTE — ASSESSMENT & PLAN NOTE
Patient being followed by EP  Patient has been weaned off dobutamine.  HR is currently 70s-80s.  Tolerating Toprol 25mg PO BID

## 2019-07-14 ENCOUNTER — APPOINTMENT (INPATIENT)
Dept: RADIOLOGY | Facility: HOSPITAL | Age: 74
DRG: 229 | End: 2019-07-14
Attending: PHYSICIAN ASSISTANT
Payer: COMMERCIAL

## 2019-07-14 LAB
ANION GAP SERPL CALC-SCNC: 6 MEQ/L (ref 3–15)
BUN SERPL-MCNC: 9 MG/DL (ref 8–20)
CALCIUM SERPL-MCNC: 8.9 MG/DL (ref 8.9–10.3)
CHLORIDE SERPL-SCNC: 102 MEQ/L (ref 98–109)
CO2 SERPL-SCNC: 28 MEQ/L (ref 22–32)
CREAT SERPL-MCNC: 0.7 MG/DL
ERYTHROCYTE [DISTWIDTH] IN BLOOD BY AUTOMATED COUNT: 12.9 % (ref 11.6–14.4)
GFR SERPL CREATININE-BSD FRML MDRD: >60 ML/MIN/1.73M*2
GLUCOSE BLD-MCNC: 106 MG/DL (ref 70–99)
GLUCOSE BLD-MCNC: 114 MG/DL (ref 70–99)
GLUCOSE BLD-MCNC: 93 MG/DL (ref 70–99)
GLUCOSE SERPL-MCNC: 97 MG/DL (ref 70–99)
HCT VFR BLDCO AUTO: 32.6 %
HGB BLD-MCNC: 11.1 G/DL
MAGNESIUM SERPL-MCNC: 1.9 MG/DL (ref 1.8–2.5)
MCH RBC QN AUTO: 29.9 PG (ref 28–33.2)
MCHC RBC AUTO-ENTMCNC: 34 G/DL (ref 32.2–36.5)
MCV RBC AUTO: 87.9 FL (ref 83–98)
PDW BLD AUTO: 10.1 FL (ref 9.4–12.4)
PLATELET # BLD AUTO: 268 K/UL
POCT TEST: ABNORMAL
POCT TEST: ABNORMAL
POCT TEST: NORMAL
POTASSIUM SERPL-SCNC: 3.9 MEQ/L (ref 3.6–5.1)
RBC # BLD AUTO: 3.71 M/UL (ref 4.5–5.8)
SODIUM SERPL-SCNC: 136 MEQ/L (ref 136–144)
WBC # BLD AUTO: 5.26 K/UL

## 2019-07-14 PROCEDURE — 63700000 HC SELF-ADMINISTRABLE DRUG: Performed by: PHYSICIAN ASSISTANT

## 2019-07-14 PROCEDURE — 63600000 HC DRUGS/DETAIL CODE: Performed by: PHYSICIAN ASSISTANT

## 2019-07-14 PROCEDURE — 36415 COLL VENOUS BLD VENIPUNCTURE: CPT | Performed by: PHYSICIAN ASSISTANT

## 2019-07-14 PROCEDURE — 71045 X-RAY EXAM CHEST 1 VIEW: CPT

## 2019-07-14 PROCEDURE — 21400000 HC ROOM AND CARE CCU/INTERMEDIATE

## 2019-07-14 PROCEDURE — 85027 COMPLETE CBC AUTOMATED: CPT | Performed by: PHYSICIAN ASSISTANT

## 2019-07-14 PROCEDURE — 99024 POSTOP FOLLOW-UP VISIT: CPT | Performed by: THORACIC SURGERY (CARDIOTHORACIC VASCULAR SURGERY)

## 2019-07-14 PROCEDURE — 99233 SBSQ HOSP IP/OBS HIGH 50: CPT | Performed by: INTERNAL MEDICINE

## 2019-07-14 PROCEDURE — 80048 BASIC METABOLIC PNL TOTAL CA: CPT | Performed by: PHYSICIAN ASSISTANT

## 2019-07-14 PROCEDURE — 94640 AIRWAY INHALATION TREATMENT: CPT

## 2019-07-14 PROCEDURE — 83735 ASSAY OF MAGNESIUM: CPT | Performed by: PHYSICIAN ASSISTANT

## 2019-07-14 PROCEDURE — 63600000 HC DRUGS/DETAIL CODE: Performed by: NURSE PRACTITIONER

## 2019-07-14 RX ORDER — FUROSEMIDE 10 MG/ML
40 INJECTION INTRAMUSCULAR; INTRAVENOUS DAILY
Status: DISCONTINUED | OUTPATIENT
Start: 2019-07-14 | End: 2019-07-15

## 2019-07-14 RX ORDER — AMIODARONE HYDROCHLORIDE 200 MG/1
400 TABLET ORAL DAILY
Status: DISCONTINUED | OUTPATIENT
Start: 2019-07-14 | End: 2019-07-15 | Stop reason: HOSPADM

## 2019-07-14 RX ADMIN — METOPROLOL SUCCINATE 12.5 MG: 25 TABLET, EXTENDED RELEASE ORAL at 21:27

## 2019-07-14 RX ADMIN — ENOXAPARIN SODIUM 40 MG: 100 INJECTION SUBCUTANEOUS at 18:05

## 2019-07-14 RX ADMIN — FAMOTIDINE 20 MG: 20 TABLET ORAL at 19:40

## 2019-07-14 RX ADMIN — Medication 10 ML: at 03:42

## 2019-07-14 RX ADMIN — FLUTICASONE FUROATE AND VILANTEROL TRIFENATATE 1 PUFF: 100; 25 POWDER RESPIRATORY (INHALATION) at 07:51

## 2019-07-14 RX ADMIN — DOCUSATE SODIUM 100 MG: 100 CAPSULE, LIQUID FILLED ORAL at 19:40

## 2019-07-14 RX ADMIN — FUROSEMIDE 40 MG: 10 INJECTION, SOLUTION INTRAMUSCULAR; INTRAVENOUS at 08:06

## 2019-07-14 RX ADMIN — MAGNESIUM SULFATE 2 G: 2 INJECTION INTRAVENOUS at 04:44

## 2019-07-14 RX ADMIN — AMIODARONE HYDROCHLORIDE 400 MG: 200 TABLET ORAL at 08:05

## 2019-07-14 RX ADMIN — CHLORHEXIDINE GLUCONATE 15 ML: 1.2 RINSE ORAL at 12:10

## 2019-07-14 RX ADMIN — FLUTICASONE PROPIONATE 1 SPRAY: 50 SPRAY, METERED NASAL at 22:18

## 2019-07-14 RX ADMIN — MONTELUKAST SODIUM 10 MG: 10 TABLET, FILM COATED ORAL at 21:27

## 2019-07-14 RX ADMIN — POTASSIUM CHLORIDE 20 MEQ: 14.9 INJECTION, SOLUTION INTRAVENOUS at 04:44

## 2019-07-14 RX ADMIN — DOCUSATE SODIUM 100 MG: 100 CAPSULE, LIQUID FILLED ORAL at 08:05

## 2019-07-14 RX ADMIN — Medication 10 ML: at 19:43

## 2019-07-14 RX ADMIN — Medication 10 ML: at 12:06

## 2019-07-14 RX ADMIN — ROSUVASTATIN CALCIUM 10 MG: 10 TABLET, FILM COATED ORAL at 21:27

## 2019-07-14 RX ADMIN — Medication 400 MG: at 19:42

## 2019-07-14 RX ADMIN — POTASSIUM CHLORIDE 20 MEQ: 750 TABLET, FILM COATED, EXTENDED RELEASE ORAL at 19:40

## 2019-07-14 RX ADMIN — TAMSULOSIN HYDROCHLORIDE 0.4 MG: 0.4 CAPSULE ORAL at 08:05

## 2019-07-14 RX ADMIN — COLCHICINE 0.6 MG: 0.6 TABLET, FILM COATED ORAL at 08:05

## 2019-07-14 RX ADMIN — FAMOTIDINE 20 MG: 20 TABLET ORAL at 08:05

## 2019-07-14 RX ADMIN — POTASSIUM CHLORIDE 20 MEQ: 750 TABLET, FILM COATED, EXTENDED RELEASE ORAL at 08:05

## 2019-07-14 RX ADMIN — Medication 400 MG: at 08:05

## 2019-07-14 NOTE — PROGRESS NOTES
Patient: Saad Hillman  Location: [address] [address]32  MRN: [id number]  Today's date: 7/14/2019    Attempted to see patient for therapy. Unable due to medical hold.  pt waiting to have tubes/lines removed

## 2019-07-14 NOTE — PLAN OF CARE
Problem: Patient Care Overview  Goal: Plan of Care Review  Outcome: Ongoing (interventions implemented as appropriate)   07/14/19 0702   Coping/Psychosocial   Plan Of Care Reviewed With patient   Plan of Care Review   Progress progress toward functional goals as expected   Outcome Summary No acute events overnight. VSS.        Problem: Pressure Ulcer Risk (Justo Scale) (Adult,Obstetrics,Pediatric)  Goal: Identify Related Risk Factors and Signs and Symptoms  Outcome: Outcome(s) Achieved Date Met: 07/14/19 07/14/19 0702   Pressure Ulcer Risk (Justo Scale)   Related Risk Factors (Pressure Ulcer Risk (Justo Scale)) age extremes;critical care admission     Goal: Skin Integrity  Outcome: Ongoing (interventions implemented as appropriate)   07/14/19 0702   Pressure Ulcer Risk (Justo Scale) (Adult,Obstetrics,Pediatric)   Skin Integrity making progress toward outcome       Problem: Cardiac Surgery (Adult)  Goal: Signs and Symptoms of Listed Potential Problems Will be Absent, Minimized or Managed (Cardiac Surgery)   07/14/19 0702   Cardiac Surgery   Problems Assessed (Cardiac Surgery) all   Problems Present (Cardiac Surgery) functional deficit;cardiac complications       Problem: Fall Risk (Adult)  Goal: Absence of Falls  Outcome: Ongoing (interventions implemented as appropriate)   07/14/19 0702   Fall Risk (Adult)   Absence of Falls making progress toward outcome

## 2019-07-14 NOTE — PROGRESS NOTES
Daily Progress Note    Subjective    Interval History: No new events overnight.  Pt denies Cp, SOB, Abd. Pain, N/V.  Pt admits to tolerating PO diet, voiding, and flatus.       Current Facility-Administered Medications   Medication Dose Route Frequency Provider Last Rate Last Dose   • acetaminophen (TYLENOL) tablet 975 mg  975 mg oral q6h [facility] Marc Edgar CRNP   975 mg at 07/13/19 2208   • albumin human 5 % solution 500 mL  500 mL intravenous PRN Kaitlin Atkinson PA C   250 mL at 07/10/19 1800   • albuterol nebulizer solution 2.5 mg  2.5 mg nebulization q6h PRN Kaitlin Atkinson PA C       • alum-mag hydroxide-simeth (MAALOX) 200-200-20 mg/5 mL suspension 30 mL  30 mL oral q4h PRN Kaitlin Atkinson PA C       • amiodarone (PACERONE) tablet 400 mg  400 mg oral Daily Pancho Jones PA C       • calcium chloride 1 g in sodium chloride 0.9 % 50 mL IVPB  1 g intravenous q6h PRN Kaitlin Atkinson PA C       • chlorhexidine (PERIDEX) 0.12 % mouthwash 15 mL  15 mL Mouth/Throat 2 times per day Kaitlin Atkinson PA C   15 mL at 07/12/19 0951   • colchicine (COLCRYS) tablet 0.6 mg  0.6 mg oral Daily Kaitlin Atkinson PA C   0.6 mg at 07/13/19 0856   • docusate sodium (COLACE) capsule 100 mg  100 mg oral BID Kaitlin Atkinson PA C   100 mg at 07/13/19 2008   • enoxaparin (LOVENOX) syringe 40 mg  40 mg subcutaneous Daily (6p) Marc Edgar CRNP   40 mg at 07/13/19 1724   • famotidine (PEPCID) injection 20 mg  20 mg intravenous BID Kaitlin Atkinson PA C   20 mg at 07/09/19 2254    Or   • famotidine (PEPCID) tablet 20 mg  20 mg oral BID Kaitlin Atkinson PA C   20 mg at 07/13/19 2008   • fluticasone furoate-vilanterol (BREO ELLIPTA) 100-25 mcg/dose powder for inhalation 1 puff  1 puff inhalation Daily Kaitlin Atkinson PA C   1 puff at 07/13/19 2317   • fluticasone propionate (FLONASE) 50 mcg/actuation nasal spray 1 spray  1 spray Each Nostril Nightly Kaitlin Atkinson PA C   1 spray at 07/13/19 1768   •  furosemide (LASIX) injection 40 mg  40 mg intravenous BID (9a, 4p) Bradley Pardo CRNP   40 mg at 07/13/19 1719   • oxyCODONE (ROXICODONE) immediate release tablet 5-10 mg  5-10 mg oral q4h PRN Kaitlin Atkinson PA C   10 mg at 07/11/19 1538    Or   • HYDROmorphone (DILAUDID) 1 mg/mL injection 0.25-0.5 mg  0.25-0.5 mg intravenous q1h PRN Kaitlin Atkinson PA C   0.5 mg at 07/11/19 0904   • ketorolac (TORADOL) injection 15 mg  15 mg intravenous q6h PRN Pancho Jones PA C   15 mg at 07/11/19 2042   • magnesium oxide (MAG-OX) tablet 400 mg  400 mg oral BID Kaitlin Atkinson PA C   400 mg at 07/13/19 2009   • magnesium sulfate IVPB 2g in 50 mL NSS/D5W/SWFI  2 g intravenous PRN Kaitlin Atkinson PA C   Stopped at 07/13/19 0557   • metoprolol succinate XL (TOPROL-XL) split 24 hr ER tablet 12.5 mg  12.5 mg oral Nightly Liset Huizar PA C   12.5 mg at 07/13/19 2209   • montelukast (SINGULAIR) tablet 10 mg  10 mg oral Nightly Kaitlin Atkinson PA C   10 mg at 07/13/19 2209   • ondansetron ODT (ZOFRAN-ODT) disintegrating tablet 4 mg  4 mg oral q8h PRN Kaitlin Atkinson PA C        Or   • ondansetron (ZOFRAN) injection 4 mg  4 mg intravenous q8h PRN Kaitlin Atkinson PA C   4 mg at 07/10/19 0025   • potassium chloride (KLOR-CON) tablet extended release 20 mEq  20 mEq oral BID Kaitlin Atkinson PA C   20 mEq at 07/13/19 2008   • potassium chloride 20 mEq in 100 mL IVPB  (premix)  20 mEq intravenous q8h PRN Kaitlin Atkinson PA C   Stopped at 07/13/19 0803   • rosuvastatin (CRESTOR) tablet 10 mg  10 mg oral Nightly Kaitlin Atkinson PA C   10 mg at 07/13/19 2209   • senna (SENOKOT) tablet 1 tablet  1 tablet oral 2x daily PRN Kaitlin Atkinson PA C       • sodium chloride flush 10 mL  10 mL intravenous q8h INT Bradley Pardo CRNP   10 mL at 07/14/19 0342   • sodium chloride flush 10 mL  10 mL intravenous PRN Bradley Pardo CRNP       • tamsulosin (FLOMAX) 24 hr ER capsule 0.4 mg  0.4 mg oral  Daily Sunni Owen PA C   0.4 mg at 07/13/19 0857     Allergies   Allergen Reactions   • Aspirin Other (see comments)     Asthma attack   • Hydrocodone-Acetaminophen GI intolerance     CONSTIPATION         Objective    Vital signs in last 24 hours:  Temp:  [36.7 °C (98.1 °F)-37.3 °C (99.1 °F)] 36.9 °C (98.4 °F)  Heart Rate:  [60-96] 67  Resp:  [14-47] 22  BP: (101-138)/() 122/66  FiO2 (%) (Set):  [28 %] 28 %      Intake/Output Summary (Last 24 hours) at 07/14/19 0433  Last data filed at 07/14/19 0200   Gross per 24 hour   Intake              150 ml   Output             3500 ml   Net            -3350 ml         Physical Exam:  /66   Pulse 67   Temp 36.9 °C (98.4 °F) (Temporal)   Resp (!) 22   Wt 86.5 kg (190 lb 11.2 oz)   SpO2 95%   BMI 28.16 kg/m²     General Appearance:   Neuro: Alert, cooperative, no distress, appears stated age    Normal strength, sensation and reflexes throughout   Lungs:   Clear to auscultation bilaterally, respirations unlabored   Heart:  Regular rate and rhythm, S1 and S2 normal, no murmur, rub or gallop   Abdomen:   Soft, non-tender, bowel sounds active all four quadrants, no masses, no organomegaly   Extremities: Extremities normal, atraumatic, no cyanosis or edema   Pulses: 2+ and symmetric all extremities   Skin:  Incisions:  Skin color, texture, turgor normal, no rashes or lesions  Clean, dry, and intact       Labs  Lab Results   Component Value Date    WBC 5.26 07/14/2019    HGB 11.1 (L) 07/14/2019    HCT 32.6 (L) 07/14/2019     07/14/2019    ALT 21 06/25/2019    AST 19 06/25/2019     07/14/2019    K 3.9 07/14/2019     07/14/2019    CREATININE 0.7 (L) 07/14/2019    BUN 9 07/14/2019    CO2 28 07/14/2019    TSH 1.04 07/02/2019    INR 1.2 07/12/2019    HGBA1C 5.4 07/02/2019       Imaging  I have independently reviewed the pertinent imaging from the last 24 hrs.    ECG/Telemetry  I have independently reviewed the telemetry. No events for the last  24 hours.    VTE Assessment: I have reassessed and the patient's VTE risk and treatment plan is appropriate.            Assessment/Plan   * Papillary fibroelastoma of heart   Assessment & Plan    Patient status post mini sternotomy with aortotomy for papillary stoma removal of the mitral valve on 7/9/2019    Patient currently on no drips  Patient currently has a PICC line, chest tube x1, V wires in place  We will eventually cut the patient's wires prior to discharge  Continue amiodarone, Toprol, Crestor  Continue colchicine for PPS prevention  Continue Pepcid for GI prophylaxis  Continue Lovenox for DVT prophylaxis  Continue Lasix 40 twice daily  Encourage I-S, ambulation, out of bed to chair     Paroxysmal atrial fibrillation (CMS/HCC) (HCC)   Assessment & Plan    Continue p.o. Amiodarone  Discussed resuming p.o. beta-blocker  Patient currently in sinus rhythm and does not need anticoagulation at this point except for DVT prophylaxis     Junctional bradycardia   Assessment & Plan    Patient being followed by EP  Patient has been weaned off dobutamine and we will discuss resuming beta-blocker     Hyperlipemia   Assessment & Plan    Continue Crestor     HTN (hypertension)   Assessment & Plan    Consider resuming Toprol today  Holding home hydrochlorothiazide and Diovan during perioperative phase     BPH (benign prostatic hyperplasia)   Assessment & Plan    Continue Flomax  Monitor for urinary retention              Expected Discharge Date:  7/16/2019 No discharge date for patient encounter.    EDILIA Edwards  7/14/2019

## 2019-07-14 NOTE — PROGRESS NOTES
Electrophysiology Progress Note      Subjective   He feels well this morning and has no complaints.  He denies chest pain, shortness of breath or palpitations.  He is maintaining sinus rhythm.    Inpatient medications:  •  acetaminophen, 975 mg, oral, q6h CATHY  •  albumin human, 500 mL, intravenous, PRN  •  albuterol, 2.5 mg, nebulization, q6h PRN  •  alum-mag hydroxide-simeth, 30 mL, oral, q4h PRN  •  amiodarone, 400 mg, oral, Daily  •  calcium chloride, 1 g, intravenous, q6h PRN  •  chlorhexidine, 15 mL, Mouth/Throat, 2 times per day  •  colchicine, 0.6 mg, oral, Daily  •  docusate sodium, 100 mg, oral, BID  •  enoxaparin, 40 mg, subcutaneous, Daily (6p)  •  famotidine, 20 mg, intravenous, BID **OR** famotidine, 20 mg, oral, BID  •  fluticasone furoate-vilanterol, 1 puff, inhalation, Daily  •  fluticasone propionate, 1 spray, Each Nostril, Nightly  •  furosemide, 40 mg, intravenous, Daily  •  ketorolac, 15 mg, intravenous, q6h PRN  •  magnesium oxide, 400 mg, oral, BID  •  magnesium sulfate, 2 g, intravenous, PRN  •  metoprolol succinate XL, 12.5 mg, oral, Nightly  •  montelukast, 10 mg, oral, Nightly  •  ondansetron ODT, 4 mg, oral, q8h PRN **OR** ondansetron, 4 mg, intravenous, q8h PRN  •  oxyCODONE, 5-10 mg, oral, q4h PRN **OR** [DISCONTINUED] HYDROmorphone, 0.25-0.5 mg, intravenous, q1h PRN  •  potassium chloride, 20 mEq, oral, BID  •  potassium chloride, 20 mEq, intravenous, q8h PRN  •  rosuvastatin, 10 mg, oral, Nightly  •  senna, 1 tablet, oral, 2x daily PRN  •  sodium chloride, 10 mL, intravenous, q8h INT  •  sodium chloride, 10 mL, intravenous, PRN  •  tamsulosin, 0.4 mg, oral, Daily    Objective    Vitals:    07/14/19 0800   BP: 120/72   Pulse: 72   Resp:    Temp:    SpO2: 92%     Physical Exam  General: No acute distress.  HEENT: Anicteric.  Moist mucous membranes.  Neck: Supple, no JVD.  Lungs: Clear to auscultation bilaterally.  Cardiac: Regular rate and rhythm.  Normal S1-S2.  No murmurs rubs or  gallops.  Abdomen: Soft, nontender.  Extremities: No lower extremity edema.  Skin: Warm, dry.  Neurologic: Grossly intact.  Psychiatric: Behavior is appropriate and cooperative.       Laboratory results:    Results from last 7 days  Lab Units 07/14/19  0342   SODIUM mEQ/L 136   POTASSIUM mEQ/L 3.9   CHLORIDE mEQ/L 102   CO2 mEQ/L 28   BUN mg/dL 9   CREATININE mg/dL 0.7*   CALCIUM mg/dL 8.9   GLUCOSE mg/dL 97       Results from last 7 days  Lab Units 07/14/19  0343   WBC K/uL 5.26   HEMOGLOBIN g/dL 11.1*   HEMATOCRIT % 32.6*   PLATELETS K/uL 268       Results from last 7 days  Lab Units 07/12/19  0325   INR INR 1.2           Imaging  Cardiac Imaging   ANESTHESIA MAYE 07/09/2019    Narrative Procedure Performed: MAYE     Start Time:        End Time:      Preanesthesia Checklist:  Patient identified, IV assessed, risks and benefits discussed, monitors   and equipment assessed, procedure being performed at surgeon's request and   anesthesia consent obtained.    General Procedure Information  Physician Requesting Echo: JAUN CASTELLANO  Location performed:  OR  Intubated  Bite block placed  Heart visualized  Probe Insertion:  Easy  Probe Type:  Multiplane and transesophageal  Modalities:  Pulse wave Doppler, color flow mapping, 3D and 2D        Anesthesia Information  Performed Personally  Anesthesiologist:  MACIEL CARLOS      Echocardiogram Comments:       Pre:  LV: EF 55%, no RWMA.  RV: normal size and function.  MV: no MR, no MS. There is a 0.7x0.9cm mass attached to the lateral cords   that attach to the AML close to the papillary muscle.   AV: no AS, no AI  TV: trace TR  IAS: no PFO by CFD.  Ao: normal caliber, no dissection.  No pericardial or pleural effusions.    Post:   LV: EF 55%, no RWMA.  RV: normal size and function.  MV: no MR, no MS. No mass remains.   AV: no AS, no AI  TV: trace TR  IAS: no PFO by CFD.  Ao: normal caliber, no dissection.  No pericardial or pleural effusions.  Exam unchanged with  chest closure.  MAYE probe removed with clean tip.  Discussed with Dr. Mart.                  Telemetry  Sinus rhythm     Paroxysmal atrial fibrillation (CMS/HCC) (HCC)   Assessment & Plan    He developed atrial fibrillation with rapid ventricular response on 7/12/2019.  He was started on amiodarone 400 mg twice daily and converted to sinus rhythm on the evening of 7/12/2019.  He remains in sinus rhythm this morning with rates in the 70s.  Continue amiodarone to 400 mg daily and watch sinus node function, plan to decrease to 200 mg daily on discharge for a short course.  If he develops recurrent atrial fibrillation, consider anticoagulation.  Please call with questions.      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  He has return of sinus node function with sinus rates in the 70s.  There is no indication for pacemaker at this time.     * Papillary fibroelastoma of heart   Assessment & Plan    S/P minimally invasive surgical resection  Per CT surg               Thank you for allowing me to participate in the care of your patient, please feel free to contact me with any questions or concerns.     Tiara Hutchinson MD  7/14/2019

## 2019-07-14 NOTE — DISCHARGE INSTRUCTIONS
DISCHARGE INSTRUCTIONS FOLLOWING CARDIAC SURGERY   Like any major operation, heart surgery is expected to drain a great deal of your strength and energy. It requires patience, determination, and, most of all, TIME to recover fully. Initially, you may be surprised by the amount of exertion which will cause you to stop and rest. How you care for yourself after discharge is extremely important to your return to your pre-operative life style.   GUIDELINE FOR ACTIVITY   During your first week at home we strongly suggest that you have assistance from family and friends. You can expect to have good days and bad days, so regulate activities according to what your body tells you. Try to balance rest and activity, and when resting remember to keep your legs elevated.   It is important to continue to weigh yourself everyday, just as you were weighed daily in the hospital. Try to weigh yourself every morning after going to the bathroom, using the same scale. A gradual weight gain of 5 pounds in 3 to 5 days, or any increase in puffiness or swelling in fingers and ankles, should be reported to your doctor immediately.   It is also important to shower daily, in order to keep your incisions clean. You should check your incision daily, and report any increased redness, drainage, or a temperature over 101 to your surgeon immediately. A simple task of taking a shower can be very challenging the first week or two after surgery and you may find that you need some assistance. A chair in the shower can be helpful if standing for a long period is too tiring. A simple plastic lawn chair can be placed in the shower, or a small shower bench can be purchased at a local drug store. A handheld shower head could also be helpful to use while sitting in the shower. You may also find that you just need an extra set of hands to help reach your back or wash your hair.   Getting dressed is another seemingly simple task that you may require assistance  with during the first week or two following surgery. It may be necessary to simplify your clothing for a while. Sweatpants may be easier at first, they are easy to pull up, and you may find that your pre-op clothes are still a bit too snug. Zip-up sweatshirts or button-up shirts are also easier, as are slip on shoes. For women, a support bra must be worn, and will also help take the pressure off the chest incision. A front hooking bra should be worn, as you will not be able to reach behind to fasten any hooks. Support stockings will also be worn during the day only. This is something that you will definitely need assistance with, as support stockings are difficult to put on and you are not allowed to pull/lift anything with your arms over 5 lbs.   As the weeks go on, you should slowly start feeling like your “old” self. By the third week you should be showering independently, and dressing on your own. You should avoid sleeping during the day, and continue with your daily walking program. By the third week you should also start participating in your normal daily activities. For example, if your family is preparing a meal, you could sit in the kitchen and help to chop the vegetables or get together the salad for dinner. You could sit down and help fold the laundry. By the fourth week you may feel ready to take a short trip to the grocery store, with someone else pushing the cart and reaching for the groceries, but you picking out the ingredients for dinner.   By the fourth or fifth week you could slowly resume driving. When you do start to drive, start with short distances until you are comfortable behind the wheel, and always remember your seat belt. You will also be ready at this time to join an outpatient cardiac rehab program. We highly encourage outpatient cardiac rehab; there your vital signs will be monitored by nurses and physical therapists while you work to meet your goal of exercising 30 min/day. By the  fourth or fifth week you should also be able to slowly resume sexual activity. This can be frightening, however talk to your partner about your feelings. Remember, no lifting anything over 5 pounds, and no upper extremity weight bearing!   Usually by week six you will be ready to return to work, as long as your job does not require any lifting. The lifting restrictions remain in effect for 12 weeks! It may have seemed like a long road, but by this time you are usually feeling pretty good!   STERNAL PRECAUTIONS   There is no lifting any more than 5 pounds for 12 weeks! People do not usually realize how much this may affect them. A gallon of milk weighs about 8 pounds. This may also be a window that is hard to open, or a sliding glass door that is heavy to open. There is no pushing or pulling with the arms, so no pushing a vacuum. You can not use your arms when getting in/out of a chair or bed or car. All these rules are in place to give your sternum time to completely heal. Think of your sternum as a broken bone, and broken bones need time to heal. Continue to use your heart pillow at home for the first week or two as a reminder not to use your arms.   RESPIRATORY/BREATHING   For the first week at home, continue to use the Incentive Spirometer that you were using in the hospital. Try to use it 4-5 times each day, doing 10 puffs each time. During the second week, you can decrease the amount to 3 times each day, doing 10 puffs each time. Should any unusual shortness of breath occur at any time, notify your doctor immediately.   DIET   For the first week at home, eat what you want! We just want to make sure that you eat! It may take a while for your appetite to come back, so maybe six small meals a day will be easier than three large meals. Also try to limit your fluid intake to 6-7 cups per day. Remember to weigh yourself daily and report any sudden weight gain. By the third week you should start to follow a heart  healthy diet. This means avoiding high salt content foods, and eating a diet balanced with protein, fruit, vegetables, and fiber.   BOWELS   If you do not have a bowel movement within 2 days of being at home, try some prune juice or an over-the-counter laxative. If there is still no bowel movement in 3 days, try an over-the-counter suppository. If there is no bowel movement in the first week, notify your doctor immediately.   MEDICATIONS   At discharge from the hospital, your nurse went over all the medications you are going to take at home. Remember to take your medications on time according to the schedule given to you by your nurse.   PAIN MANAGEMENT   You will be sent home with pain medication to use at home. Remember to use your pain pills when you need them, don’t “tough it out”! Use your pain pills before activity, at bedtime, and when needed. By the second or third week you may be able to use Extra Strength Tylenol for pain. Should you experience any pain not relieved by your pain pills, notify your doctor immediately. Remember, no driving while taking prescription pain meds!   FAMILY   For the first week at home you should try to limit your visitors. Entertaining even your closest friends and family may tire you out too much. Remember that it is okay to excuse yourself when you are too tired. During the second to third week you can start to increase your number of visitors as tolerated. By the fourth week, enjoy yourself!   It is also important to mention that sometimes following surgery people become depressed. Family and friends should be aware of signs of depression. It is important to watch for changes in appetite, changes in sleep patterns, not wanting to see friends/family, not wanting to participate in daily activities. Any sign of depression should be reported to the doctor.   WHEN TO CALL THE DOCTOR   As previously mentioned, call the doctor for:   ~ Temperature over 101   ~ Increased redness or  drainage from incisions   ~ Unusual shortness of breath   ~ No bowel movement in the first week   ~ Gradual weight gain of 5 pounds in 3-5 days   ~ Increased puffiness or swelling in fingers or ankles   ~ Pain not relieved by pain pills   FOR ANY QUESTIONS OR CONCERNS,   CALL SURGEON'S OFFICE: [phone]

## 2019-07-15 ENCOUNTER — APPOINTMENT (INPATIENT)
Dept: RADIOLOGY | Facility: HOSPITAL | Age: 74
DRG: 229 | End: 2019-07-15
Attending: NURSE PRACTITIONER
Payer: COMMERCIAL

## 2019-07-15 VITALS
HEART RATE: 84 BPM | WEIGHT: 180.3 LBS | TEMPERATURE: 98.1 F | SYSTOLIC BLOOD PRESSURE: 133 MMHG | BODY MASS INDEX: 26.63 KG/M2 | DIASTOLIC BLOOD PRESSURE: 73 MMHG | RESPIRATION RATE: 16 BRPM | OXYGEN SATURATION: 91 %

## 2019-07-15 LAB
ANION GAP SERPL CALC-SCNC: 9 MEQ/L (ref 3–15)
BUN SERPL-MCNC: 8 MG/DL (ref 8–20)
CALCIUM SERPL-MCNC: 9.4 MG/DL (ref 8.9–10.3)
CHLORIDE SERPL-SCNC: 105 MEQ/L (ref 98–109)
CO2 SERPL-SCNC: 25 MEQ/L (ref 22–32)
CREAT SERPL-MCNC: 0.8 MG/DL
ERYTHROCYTE [DISTWIDTH] IN BLOOD BY AUTOMATED COUNT: 13.3 % (ref 11.6–14.4)
GFR SERPL CREATININE-BSD FRML MDRD: >60 ML/MIN/1.73M*2
GLUCOSE SERPL-MCNC: 99 MG/DL (ref 70–99)
HCT VFR BLDCO AUTO: 35.3 %
HGB BLD-MCNC: 11.6 G/DL
MAGNESIUM SERPL-MCNC: 2 MG/DL (ref 1.8–2.5)
MCH RBC QN AUTO: 29.7 PG (ref 28–33.2)
MCHC RBC AUTO-ENTMCNC: 32.9 G/DL (ref 32.2–36.5)
MCV RBC AUTO: 90.3 FL (ref 83–98)
PDW BLD AUTO: 9.9 FL (ref 9.4–12.4)
PLATELET # BLD AUTO: 287 K/UL
POTASSIUM SERPL-SCNC: 4.2 MEQ/L (ref 3.6–5.1)
RBC # BLD AUTO: 3.91 M/UL (ref 4.5–5.8)
SODIUM SERPL-SCNC: 139 MEQ/L (ref 136–144)
WBC # BLD AUTO: 6.23 K/UL

## 2019-07-15 PROCEDURE — 63700000 HC SELF-ADMINISTRABLE DRUG: Performed by: PHYSICIAN ASSISTANT

## 2019-07-15 PROCEDURE — 85027 COMPLETE CBC AUTOMATED: CPT | Performed by: PHYSICIAN ASSISTANT

## 2019-07-15 PROCEDURE — 80048 BASIC METABOLIC PNL TOTAL CA: CPT | Performed by: PHYSICIAN ASSISTANT

## 2019-07-15 PROCEDURE — 63700000 HC SELF-ADMINISTRABLE DRUG: Performed by: NURSE PRACTITIONER

## 2019-07-15 PROCEDURE — 83735 ASSAY OF MAGNESIUM: CPT | Performed by: PHYSICIAN ASSISTANT

## 2019-07-15 PROCEDURE — 71046 X-RAY EXAM CHEST 2 VIEWS: CPT

## 2019-07-15 PROCEDURE — 36415 COLL VENOUS BLD VENIPUNCTURE: CPT | Performed by: PHYSICIAN ASSISTANT

## 2019-07-15 PROCEDURE — 99231 SBSQ HOSP IP/OBS SF/LOW 25: CPT | Performed by: ANESTHESIOLOGY

## 2019-07-15 RX ORDER — AMIODARONE HYDROCHLORIDE 200 MG/1
200 TABLET ORAL DAILY
Qty: 30 TABLET | Refills: 0 | Status: SHIPPED | OUTPATIENT
Start: 2019-07-16 | End: 2019-11-13 | Stop reason: ALTCHOICE

## 2019-07-15 RX ORDER — LANOLIN ALCOHOL/MO/W.PET/CERES
400 CREAM (GRAM) TOPICAL DAILY
Qty: 169 TABLET | Refills: 0 | Status: SHIPPED | OUTPATIENT
Start: 2019-07-15 | End: 2019-11-13 | Stop reason: ALTCHOICE

## 2019-07-15 RX ORDER — FUROSEMIDE 40 MG/1
40 TABLET ORAL DAILY
Status: DISCONTINUED | OUTPATIENT
Start: 2019-07-15 | End: 2019-07-15 | Stop reason: HOSPADM

## 2019-07-15 RX ORDER — METOPROLOL SUCCINATE 25 MG/1
25 TABLET, EXTENDED RELEASE ORAL EVERY 12 HOURS
Qty: 60 TABLET | Refills: 0 | Status: SHIPPED | OUTPATIENT
Start: 2019-07-15 | End: 2020-05-21

## 2019-07-15 RX ORDER — OXYCODONE HYDROCHLORIDE 5 MG/1
5-10 TABLET ORAL EVERY 4 HOURS PRN
Qty: 20 TABLET | Refills: 0 | Status: SHIPPED | OUTPATIENT
Start: 2019-07-15 | End: 2019-11-13 | Stop reason: ALTCHOICE

## 2019-07-15 RX ORDER — METOPROLOL SUCCINATE 25 MG/1
25 TABLET, EXTENDED RELEASE ORAL EVERY 12 HOURS
Status: DISCONTINUED | OUTPATIENT
Start: 2019-07-15 | End: 2019-07-15 | Stop reason: HOSPADM

## 2019-07-15 RX ORDER — LANOLIN ALCOHOL/MO/W.PET/CERES
400 CREAM (GRAM) TOPICAL ONCE
Status: COMPLETED | OUTPATIENT
Start: 2019-07-15 | End: 2019-07-15

## 2019-07-15 RX ORDER — METOPROLOL SUCCINATE 25 MG/1
25 TABLET, EXTENDED RELEASE ORAL NIGHTLY
Status: DISCONTINUED | OUTPATIENT
Start: 2019-07-15 | End: 2019-07-15

## 2019-07-15 RX ORDER — TAMSULOSIN HYDROCHLORIDE 0.4 MG/1
0.4 CAPSULE ORAL DAILY
Qty: 30 CAPSULE | Refills: 0 | Status: SHIPPED | OUTPATIENT
Start: 2019-07-16 | End: 2019-11-13 | Stop reason: ALTCHOICE

## 2019-07-15 RX ORDER — POTASSIUM CHLORIDE 750 MG/1
10 TABLET, EXTENDED RELEASE ORAL DAILY
Qty: 30 TABLET | Refills: 0 | Status: SHIPPED | OUTPATIENT
Start: 2019-07-15 | End: 2019-11-13 | Stop reason: ALTCHOICE

## 2019-07-15 RX ORDER — DOCUSATE SODIUM 100 MG/1
100 CAPSULE, LIQUID FILLED ORAL 2 TIMES DAILY
Qty: 170 CAPSULE | Refills: 0 | Status: SHIPPED | OUTPATIENT
Start: 2019-07-15 | End: 2019-11-13 | Stop reason: ALTCHOICE

## 2019-07-15 RX ORDER — COLCHICINE 0.6 MG/1
0.6 TABLET ORAL DAILY
Qty: 84 TABLET | Refills: 0 | Status: SHIPPED | OUTPATIENT
Start: 2019-07-16 | End: 2019-11-13 | Stop reason: ALTCHOICE

## 2019-07-15 RX ORDER — POTASSIUM CHLORIDE 750 MG/1
20 TABLET, FILM COATED, EXTENDED RELEASE ORAL DAILY
Status: DISCONTINUED | OUTPATIENT
Start: 2019-07-15 | End: 2019-07-15 | Stop reason: HOSPADM

## 2019-07-15 RX ORDER — FUROSEMIDE 20 MG/1
20 TABLET ORAL DAILY
Qty: 89 TABLET | Refills: 0 | Status: SHIPPED | OUTPATIENT
Start: 2019-07-16 | End: 2019-11-13 | Stop reason: ALTCHOICE

## 2019-07-15 RX ADMIN — AMIODARONE HYDROCHLORIDE 400 MG: 200 TABLET ORAL at 08:47

## 2019-07-15 RX ADMIN — FLUTICASONE FUROATE AND VILANTEROL TRIFENATATE 1 PUFF: 100; 25 POWDER RESPIRATORY (INHALATION) at 10:02

## 2019-07-15 RX ADMIN — TAMSULOSIN HYDROCHLORIDE 0.4 MG: 0.4 CAPSULE ORAL at 08:47

## 2019-07-15 RX ADMIN — COLCHICINE 0.6 MG: 0.6 TABLET, FILM COATED ORAL at 08:48

## 2019-07-15 RX ADMIN — POTASSIUM CHLORIDE 20 MEQ: 750 TABLET, FILM COATED, EXTENDED RELEASE ORAL at 08:47

## 2019-07-15 RX ADMIN — FUROSEMIDE 40 MG: 40 TABLET ORAL at 08:47

## 2019-07-15 RX ADMIN — Medication 400 MG: at 08:47

## 2019-07-15 RX ADMIN — METOPROLOL SUCCINATE 25 MG: 25 TABLET, EXTENDED RELEASE ORAL at 09:34

## 2019-07-15 RX ADMIN — Medication 10 ML: at 05:09

## 2019-07-15 RX ADMIN — Medication 400 MG: at 10:12

## 2019-07-15 RX ADMIN — FAMOTIDINE 20 MG: 20 TABLET ORAL at 08:47

## 2019-07-15 NOTE — ASSESSMENT & PLAN NOTE
He is back in sinus rhythm.  He is on amiodarone and metoprolol.  He is not currently anticoagulated.

## 2019-07-15 NOTE — DISCHARGE SUMMARY
Cardiac Surgery -  Inpatient Discharge Summary            BRIEF OVERVIEW  Admitting Provider:  H&P Notes 6/14/2019 to 7/15/2019     Date of Service Author Author Type Status Note Type File Time    07/02/19 1045 Minerva Camargo CRNP Nurse Practitioner Signed H&P 07/02/19 1145          Attending Provider: José Mart * Attending phys phone: [phone]  Primary Care Physician at Discharge: José Frank [phone]    Admission Date: 7/9/2019     Discharge Date: 7/15/2019    Primary Discharge Diagnosis  Papillary fibroelastoma of heart    Secondary Discharge Diagnosis  Active Hospital Problems    Diagnosis Date Noted   • Paroxysmal atrial fibrillation (CMS/HCC) (HCC) 07/12/2019     Priority: High   • Junctional bradycardia 07/10/2019     Priority: High   • HTN (hypertension) 07/13/2019   • Hyperlipemia 07/13/2019   • 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       DETAILS OF HOSPITAL STAY    Operative Procedures Performed  Procedure(s):  FIBROELASTOMA EXCISION FROM MITRAL VALVE.    Consults:   Consult Notes 6/14/2019 to 7/15/2019     Date of Service Author Author Type Status Note Type File Time    07/10/19 1510 Donald Mark MD Fellow Attested Consults 07/10/19 2158    07/10/19 0857 Guerda Thornton RD Registered Dietitian Signed Consults 07/10/19 0859    07/10/19 0810 Aaron Valencia MD Physician Signed Consults 07/10/19 0816          Consult Orders During Admission:  IP CONSULT TO NUTRITION SERVICES  IP CONSULT TO CARDIAC REHAB  IP CONSULT TO CASE MANAGEMENT  IP CONSULT TO PHYSICAL MEDICINE REHAB  IP CONSULT TO ELECTROPHYSIOLOGY         Presenting Problem/History of Present Illness  Papillary fibroelastoma of heart     Patient underwent routine echocardiogram, and had noted a papillary fibroblastoma attached to the  chordae/subvalvular apparatus of the anterior mitral valve leaflet.  Patient was referred to Dr. Mart for evaluation.    Exam on Day of Discharge    Physical Exam   Constitutional: He is oriented to person, place, and time. He appears well-developed and well-nourished.   HENT:   Head: Normocephalic and atraumatic.   Eyes: Pupils are equal, round, and reactive to light. Conjunctivae are normal.   Neck: Normal range of motion. Neck supple.   Cardiovascular: Normal rate, regular rhythm, normal heart sounds and intact distal pulses.    Pulmonary/Chest: Effort normal and breath sounds normal.   Abdominal: Soft. Bowel sounds are normal.   Musculoskeletal: Normal range of motion.   Neurological: He is alert and oriented to person, place, and time.   Skin: Skin is warm and dry.   Sutures removed. Dressing over chest tube sites in R axilla    Psychiatric: He has a normal mood and affect. His behavior is normal. Judgment and thought content normal.   Nursing note and vitals reviewed.        Hospital Course  Saad Hillman is a 74 y.o. male was admitted to [facility] on 7/9/2019 via the operating room and underwent a mini sternotomy with aortotomy for a papillary fibroblastoma from the mitral valve.  Patient received no blood products intraoperatively and was transferred to CT ICU intubated on dobutamine, Cardene, Amicar, and Precedex.  Patient was extubated in the early hours of 7/10/2019.  Chest x-ray noted moderate pneumothorax.  Patient also had noted junctional rhythm with weaning of dobutamine.  On 7/11/2019 right pneumothorax more prevalent, a right chest tube was placed.  Dobutamine was weaned off on 7/12/2019-patient did have episode of atrial fibrillation with RVR, EP was consulted and amiodarone p.o. was initiated.  PICC line was placed at that time.  Patient chemically cardioverted later that afternoon.  On 7/14 pacer wires were cut and chest tubes were discontinued.  Patient was transferred out of CT  ICU to CT stepdown unit.  Patient will be discharged home today to the care of his family.    Patient will be discharged on amiodarone 200 mg daily for rhythm management as well as toprol xl 25mg twice daily.  Patient will be discharged on colchicine 0.6 mg daily for 30 days following his discharge for PPS prophylaxis.  Patient will also be discharged on 20 mg p.o. Lasix daily with supplemental magnesium oxide and potassium chloride for 30 days following discharge.  If Lasix is discontinued, patient may stop magnesium oxide and potassium chloride.    Patient weight on admission was 178 pounds.  Patient weight at discharge is 180 pounds.    Assessment and Plan  Paroxysmal atrial fibrillation (CMS/HCC) (HCC)   Assessment & Plan    EP consult  Continue p.o. Amiodarone and Toprol XL 25mg BID   Patient currently in sinus rhythm and does not need anticoagulation as only 2 episodes of Atrial Fib     Junctional bradycardia   Assessment & Plan    Patient being followed by EP  Patient has been weaned off dobutamine.  HR is currently 70s-80s.  Tolerating Toprol 25mg PO BID      Hyperlipemia   Assessment & Plan    Continue Crestor     HTN (hypertension)   Assessment & Plan    Toprol 25mg BID   Holding home hydrochlorothiazide and Diovan during perioperative phase     BPH (benign prostatic hyperplasia)   Assessment & Plan    Continue Flomax  No further urinary retention      * Papillary fibroelastoma of heart   Assessment & Plan    Patient status post mini sternotomy with aortotomy for papillary stoma removal of the mitral valve on 7/9/2019  PICC line removed today prior to discharge  Continue amiodarone, Toprol, Crestor  Continue colchicine for PPS prevention  Decreased lasix to 20mg daily with K and mg repletion   Encourage I-S, ambulation  Plan to discharge home today            Discharge Orders  Discharge Orders Needing Review     Order Details Provider Order Origin    acetaminophen (TYLENOL) 500 mg tablet Take 500 mg by  mouth every 6 (six) hours as needed for mild pain. Provider, MD Hazel Prior to Admission    acetaminophen (TYLENOL) tablet 975 mg 975 mg, oral, Every 6 hours, First dose on Wed 7/10/19 at 0100, For 90 days Liset Huizar PA C Inpatient    albumin human 5 % solution 500 mL 500 mL, intravenous, As needed, to maintain PAD/CVP, Starting Tue 7/9/19 at 1919, For 90 daysICU Phase If filling pressure < min, mean BP , and urine output >= 30 mL/hr then do not treat. Liset Huizar PA C Inpatient    albuterol HFA (VENTOLIN HFA) 90 mcg/actuation inhaler Inhale 2 puffs every 6 (six) hours as needed. Provider, MD Hazel Prior to Admission    albuterol nebulizer solution 2.5 mg 2.5 mg, nebulization, Every 6 hours PRN, wheezing, Starting Tue 7/9/19 at 2038mlhs auto substitution for albuterol inhaler prn Liset Huizar PA C Inpatient    alum-mag hydroxide-simeth (MAALOX) 200-200-20 mg/5 mL suspension 30 mL 30 mL, oral, Every 4 hours PRN, indigestion, Starting Tue 7/9/19 at 1919, For 90 days** Shake well before administration ** Liset Huizar PA C Inpatient    amiodarone (PACERONE) tablet 400 mg 400 mg, oral, Daily, First dose on Sun 7/14/19 at 0900 Liset Huizar PA C Inpatient    calcium chloride 1 g in sodium chloride 0.9 % 50 mL IVPB 1 g, intravenous, at 100 mL/hr, Administer over 30 Minutes, Every 6 hours PRN, ionized Ca less than 1.15 mmol/L, Starting Tue 7/9/19 at 1919Must be administered through a 0.2 micron filter; Do not administer through midline catheter Liset Huizar PA C Inpatient    chlorhexidine (PERIDEX) 0.12 % mouthwash 15 mL 15 mL, Mouth/Throat, User specified (2 times per day), First dose on Tue 7/9/19 at 2200 Bhupendra, Liset M, PA C Inpatient    colchicine (COLCRYS) tablet 0.6 mg 0.6 mg, oral, Daily, First dose on Tue 7/9/19 at 2015, For 90 days Liset Huizar, PA C Inpatient    docusate sodium (COLACE) capsule 100 mg 100 mg, oral, 2 times daily, First  dose on Tue 7/9/19 at 2015, For 90 daysHold for diarrhea Liset Huizar PA C Inpatient    enoxaparin (LOVENOX) syringe 40 mg 40 mg, subcutaneous, Daily (6p), First dose on Wed 7/10/19 at 1800*Caution*: Do not purge air bubble from 30mg or 40mg syringes prior to administration to avoid loss of drug. The air bubble may be expelled from doses larger than 40mg to adjust for exact dose.  Must be deep SubQ Injection with the patient lying down.  Use left and right anteroLATERAL and left and right posteroLATERAL abdominal wall.  Must alternate sites from side to side with each injection  Liset Huizar PA C Inpatient    famotidine (PEPCID) tablet 20 mg 20 mg, oral, 2 times daily, First dose on Tue 7/9/19 at 2015Indications: Stress Ulcer Prevention Liset Huizar PA C Inpatient    fluticasone furoate-vilanterol (BREO ELLIPTA) 100-25 mcg/dose powder for inhalation 1 puff 1 puff, inhalation, Daily, First dose on Tue 7/9/19 at 2015Rinse mouth with water after use to reduce aftertaste and incidence of candidiasis. Do not swallow. Liset Huizar PA C Inpatient    fluticasone propion-salmeterol (ADVAIR DISKUS) 100-50 mcg/dose diskus inhaler Inhale 1 puff 2 (two) times a day.   Hazel Barker MD Prior to Admission    fluticasone propionate (FLONASE) 50 mcg/actuation nasal spray 1 spray 1 spray, Each Nostril, Nightly, First dose on Tue 7/9/19 at 2200** Shake well before administration ** Liset Huizar PA C Inpatient    fluticasone propionate (FLONASE) 50 mcg/actuation nasal spray Administer 1 spray into affected nostril(s) nightly.   Hazel Barker MD Prior to Admission    furosemide (LASIX) tablet 40 mg 40 mg, oral, Daily, First dose on Mon 7/15/19 at 0900, For 90 days Huong Munoz CRNP Inpatient    hydrochlorothiazide (HYDRODIURIL) 25 mg tablet Take 25 mg by mouth every morning.   Hazel Barker MD Prior to Admission    magnesium oxide (MAG-OX) tablet 400 mg 400 mg,  oral, 2 times daily, First dose on Tue 7/9/19 at 2015, For 90 daysHold if Magnesium level >= 2.8 mg/dL and notify physician Liset Huizar PA C Inpatient    magnesium sulfate IVPB 2g in 50 mL NSS/[id number] 2 g, intravenous, at 25 mL/hr, Administer over 120 Minutes, As needed, if magnesium level < 2.2 mg/dL, Starting Tue 7/9/19 at 1919 Liset Huizar PA C Inpatient    metoprolol succinate XL (TOPROL-XL) 24 hr ER tablet 25 mg 25 mg, oral, Every 12 hours, First dose on Mon 7/15/19 at 1015Hold if SBP less than 100; HR less than 60 ** Do not crush, chew, or liu **  Huong Munoz CRNP Inpatient    montelukast (SINGULAIR) 10 mg tablet Take 10 mg by mouth nightly.   Provider, MD Hazel Prior to Admission    montelukast (SINGULAIR) tablet 10 mg 10 mg, oral, Nightly, First dose on Tue 7/9/19 at 2200 Liset Huizar PA C Inpatient    mupirocin (BACTROBAN) 2 % nasal ointment Use dime size amt in each nostril twice daily five (5) days prior to surgery. Zahraa Betancourt CRNP Prior to Admission    ondansetron (ZOFRAN) injection 4 mg 4 mg, intravenous, Every 8 hours PRN, nausea, vomiting, Nausea/Vomiting, Starting Tue 7/9/19 at 1919, For 90 daysIf unable to take orally. Liset Huizar PA C Inpatient    ondansetron ODT (ZOFRAN-ODT) disintegrating tablet 4 mg 4 mg, oral, Every 8 hours PRN, nausea, vomiting, Nausea/Vomiting, Starting Tue 7/9/19 at 1919, For 90 days Liset Huizar PA C Inpatient    oxyCODONE (ROXICODONE) immediate release tablet 5-10 mg 5-10 mg, oral, Every 4 hours PRN, pain, Pain 8 - 10, Starting Tue 7/9/19 at 1919, For 14 daysStart with 5mg; if pain not relieved, and patient not sedated, may increase subsequent doses. May be given with Acetaminophen/NSAIDs for multimodal analgesia.  May use hydromorphone IV if unable to take oral medication. Liset Huizar PA C Inpatient    potassium chloride (KLOR-CON) tablet extended release 20 mEq 20 mEq, oral, Daily, First  dose on Mon 7/15/19 at 0900** Do not crush, chew, or liu **  Huong Munoz CRNP Inpatient    potassium chloride 20 mEq in 100 mL IVPB  (premix) 20 mEq, intravenous, at 50 mL/hr, Administer over 120 Minutes, Every 8 hours PRN, < 4.5 mEq/l target 4.5-5, Starting Tue 7/9/19 at 1919, For 90 daysICU Only.  Central Line Only.  Target 4.5-5 mEq/L. Draw serum K one hour before.  Repeat up to 3 times, and notify physician if K continues < 4.5 mEq/L. Premix bag Liset Huizar PA C Inpatient    rosuvastatin (CRESTOR) 10 mg tablet Take 10 mg by mouth nightly. Instructed to stop 7 days prior to surgery per Dr Mart' office  Provider, MD Hazel Prior to Admission    rosuvastatin (CRESTOR) tablet 10 mg 10 mg, oral, Nightly, First dose on Tue 7/9/19 at 2200 Liset Huizar PA C Inpatient    senna (SENOKOT) tablet 1 tablet 1 tablet, oral, 2 times daily PRN, constipation, IF COLACE UNSUCCESSFUL, Starting Tue 7/9/19 at 1919, For 90 daysHold for diarrhea. TRY IF COLACE DOES NOT WORK Liset Huizar PA C Inpatient    sodium chloride flush 10 mL 10 mL, intravenous, Every 8 hours interval, First dose [facility] Fri [facility]12/19 [facility] care and maintenanceIndications: prevent clot from blocking an intravenous catheter Liset Huizar PA C Inpatient    sodium chloride flush 10 mL 10 mL, intravenous, As needed, line care, PICC care and maintenance, Starting Fri 7/12/19 at 1050Indications: prevent clot from blocking an intravenous catheter Liset Huizar PA C Inpatient    tamsulosin (FLOMAX) 24 hr ER capsule 0.4 mg 0.4 mg, oral, Daily, First dose on Wed 7/10/19 at 1045** Do not crush, chew, or liu **  Liset Huizar PA C Inpatient    valsartan (DIOVAN) 80 mg tablet Take 80 mg by mouth every morning. Instructed to stop 2 days prior to surgery per Dr Mart' office  Provider, MD Hazel Prior to Admission          Outpatient Follow-Ups            In 2 weeks EDILIA Patten  [facility]    In 1 month EDILIA Patten [facility]        Referrals:  No orders of the defined types were placed in this encounter.      Discharge Disposition  Home   Code Status at Discharge: Full Code  Physician Order for Life-Sustaining Treatment Document Status      No documents found                STEPHANIE Beckwith  7/15/2019

## 2019-07-15 NOTE — PROGRESS NOTES
Patient was seen and examined 07/15/19     Subjective   Status post resection of papillary fibroblastoma of the heart.  Postoperative atrial fibrillation.  Looks and feels well.  No chest pain or palpitation.  Breathing is comfortable.  Bowels and bladder functioning well.  Ambulating extended distances.    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       Surgical History:   Past Surgical History:   Procedure Laterality Date   • FINGER SURGERY      s/p traumatic amputation- had repaired    • HERNIA REPAIR  2012    left inguinal hernia repair    • LIPOMA RESECTION  1999    right shoulder x 2 and back of neck    • NASAL POLYP EXCISION  1966- 1999    x 4    • TONSILLECTOMY  1948   • TRANSURETHRAL RESECTION OF PROSTATE  2017   • VASECTOMY  1980's     Allergies: Aspirin and Hydrocodone-acetaminophen    Social History:   Social History     Social History Narrative   • No narrative on file     Lives with: Lives With: spouse  Prior Function Level: Function Level Prior  Ambulation: independent  Transferring: independent  Toileting: independent  Bathing: independent  Dressing: independent  Eating: independent  Equipment Currently Used at Home: none  Prior Functional Level Comment: ind w/o AD w/func mob for community distances.   Family History:   Family History   Problem Relation Age of Onset   • Abdominal Aortic Aneurysm Mother    • Hyperlipidemia Mother    • Stroke Father        Review of Systems:      Physical Exam    Objective     Vital signs in last 24 hours:  Temp:  [36.6 °C (97.8  °F)-37.1 °C (98.7 °F)] 36.7 °C (98.1 °F)  Heart Rate:  [] 83  Resp:  [16-22] 16  BP: (113-141)/(62-87) 133/73  Pleasant adult male in no apparent discomfort walking in the hallway.  Breathing unlabored.  Lungs fairly clear.  Heart regular  Abdomen with active bowel sounds  Trace lower extremity edema.  Good strength and cognition.  Gait safe.     Lab Results   Component Value Date    WBC 6.23 07/15/2019    HGB 11.6 (L) 07/15/2019    HCT 35.3 (L) 07/15/2019     07/15/2019    ALT 21 06/25/2019    AST 19 06/25/2019     07/15/2019    K 4.2 07/15/2019     07/15/2019    CREATININE 0.8 07/15/2019    BUN 8 07/15/2019    CO2 25 07/15/2019    TSH 1.04 07/02/2019    INR 1.2 07/12/2019    HGBA1C 5.4 07/02/2019         Labs creatinine normal    Imaging    Assessment   Assessment     Paroxysmal atrial fibrillation (CMS/HCC) (HCC)   Assessment & Plan    He is back in sinus rhythm.  He is on amiodarone and metoprolol.  He is not currently anticoagulated.     Asthma   Assessment & Plan    As an outpatient, he was on Singulair and inhalers.  His breathing is currently unlabored and there is no wheezing.  He is ambulating on room air with no shortness of breath.     * Papillary fibroelastoma of heart   Assessment & Plan    This was manifested by edema in the arms and legs.  He has had resection and is doing well postoperatively.  He is ambulating extended distances and should be able to return home when medically ready.    He has a history of hypertension.  Pressure is good on metoprolol.  He was on hydrochlorothiazide and valsartan as an outpatient.            Plan of care was discussed with      Aaron Valencia MD  7/15/2019  7:29 AM

## 2019-07-15 NOTE — PROGRESS NOTES
Discussed in CPR. To d/c PICC. CRNP to speak with EP regarding need for anticoagulation. Possible d/c today. Dhruv at [facility] advised.

## 2019-07-15 NOTE — PROGRESS NOTES
Cardiothoracic Intensivist Progress Note       CARDIOTHORACIC   Post-Operative Day # 6     Subjective     History of Present Illness: Saad Hillman is a 74 y.o. 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                              Review of Systems:                                     Constitutional: no acute distress                                                  Eyes: denies difficulty with vision  Ears, nose, mouth, throat, and face: denies oral discomfort, + [place]                                        Respiratory: mild shortness of breath                                  Cardiovascular: mild chest discomfort at incision site                                  Gastrointestinal: denies abdominal pain                                    Genitourinary: denies difficulty voiding                                      Hematologic: denies bleeding issues                                Musculoskeletal: denies muscle pain                                      Neurological: generalized weakness                                   Integumentary: denies rash    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       Surgical History:   Past Surgical History:   Procedure Laterality Date   • FINGER SURGERY      s/p traumatic amputation- had repaired    • HERNIA REPAIR  2012    left inguinal hernia repair    • LIPOMA RESECTION  1999    right shoulder x 2 and back of neck    • NASAL POLYP EXCISION  1966- 1999     x 4    • TONSILLECTOMY  1948   • TRANSURETHRAL RESECTION OF PROSTATE  2017   • VASECTOMY  1980's       Allergies: Aspirin and Hydrocodone-acetaminophen    Social History:   Social History     Social History   • Marital status:      Spouse name: Umu    • Number of children: 2   • Years of education: N/A     Social History Main Topics   • Smoking status: Never Smoker   • Smokeless tobacco: Never Used   • Alcohol use Yes      Comment: occasional    • Drug use: No   • Sexual activity: Not Asked     Other Topics Concern   • None     Social History Narrative   • None       Family History:   Family History   Problem Relation Age of Onset   • Abdominal Aortic Aneurysm Mother    • Hyperlipidemia Mother    • Stroke Father        Objective     Vital signs in last 24 hours:  Temp:  [36.6 °C (97.8 °F)-37.1 °C (98.7 °F)] 36.7 °C (98.1 °F)  Heart Rate:  [70-96] 84  Resp:  [16-20] 16  BP: (113-141)/(62-87) 133/73      Intake/Output Summary (Last 24 hours) at 07/15/19 0949  Last data filed at 07/14/19 1400   Gross per 24 hour   Intake                0 ml   Output             1325 ml   Net            -1325 ml     Intake/Output this shift:  No intake/output data recorded.    Labs  Lab Results   Component Value Date    WBC 6.23 07/15/2019    HGB 11.6 (L) 07/15/2019    HCT 35.3 (L) 07/15/2019     07/15/2019    ALT 21 06/25/2019    AST 19 06/25/2019     07/15/2019    K 4.2 07/15/2019     07/15/2019    CREATININE 0.8 07/15/2019    BUN 8 07/15/2019    CO2 25 07/15/2019    MG 2.0 07/15/2019    PHOS 2.6 07/11/2019    TSH 1.04 07/02/2019    INR 1.2 07/12/2019    HGBA1C 5.4 07/02/2019    PT 14.6 (H) 07/12/2019    PTT 47 (H) 07/09/2019       Full Code    Head/Ear/Nose/Throat:     NCAT.                               Eyes:     EOMI and PERRL.                     Respiratory:     diminished at the bases b/l.               Cardiovascular:     RRR and normal S1, S2.              Gastrointestinal:     + Bowel sounds and  non-tender.                 Genitourinary:     No deformities.                    Extremities:     trace edema b/l lower extremities.            Musculoskeletal:      Right index finger amputation.                   Neurological:     follows commands.       Behavior/Emotional:     appropriate and cooperative.                           Lymph:      No adenopathy noted.                               Skin:      Clean, dry and intact.     Examination of the patient performed at the bedside. Rounded with ICU team and discussed management/plan with surgical staff. Medications, radiological studies and labs reviewed and discussed with attending of record, Dr. Palomo Mart.    Cardiothoracic Assessment and Plan:    Neurologic: A&Ox3 and pain controlled.     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 Off vasoactive medications and meeting BP goals. Paroxysmal atrial fibrillation being treated with amiodarone therapy. Will touch base with EP to see if systemic anticoagulation appropriate. Will continue to optimize cardiac medications. Continue statin for dyslipidemia.      Respiratory: I personally reviewed the CXR which portrayed b/l pulmonary congestion and small lung volumes relatively unchanged compared to previous exam. CT for pneumothorax has been discontinued due to resolution. Will encourage IS, mobilization and wean O2 PRN. Continue asthma medication.     Genitourinary: No braden. Making adequate urine output. Will avoid nephrotoxic medications.     Endocrine: Follow FSBG.     Hematologic: No evidence active bleeding. Lovenox for DVT ppx. Acute (expected) blood loss anemia from surgery.      Infectious Disease: No indication for antibiotics at this time.     Fluids, Electrolytes: Electrolytes replaced per protocol.    Gastrointestinal: PO as tolerated. GI ppx.     Skin: OOB, PT.      Tubes, lines and drains: Will remove superfluous invasive monitors  PRN.    Disposition: Discharge home today.         John Wyatt,   7/15/2019

## 2019-07-15 NOTE — PLAN OF CARE
Problem: Patient Care Overview  Goal: Plan of Care Review   07/15/19 0352   Coping/Psychosocial   Plan Of Care Reviewed With patient   Plan of Care Review   Progress improving   Outcome Summary progressing well towards discharge     Goal: Individualization & Mutuality  Outcome: Ongoing (interventions implemented as appropriate)      Problem: Pressure Ulcer Risk (Justo Scale) (Adult,Obstetrics,Pediatric)  Goal: Skin Integrity  Outcome: Ongoing (interventions implemented as appropriate)      Problem: Cardiac Surgery (Adult)  Goal: Signs and Symptoms of Listed Potential Problems Will be Absent, Minimized or Managed (Cardiac Surgery)  Outcome: Ongoing (interventions implemented as appropriate)      Problem: Fall Risk (Adult)  Goal: Identify Related Risk Factors and Signs and Symptoms  Outcome: Ongoing (interventions implemented as appropriate)    Goal: Absence of Falls  Outcome: Ongoing (interventions implemented as appropriate)

## 2019-07-15 NOTE — PLAN OF CARE
Problem: Patient Care Overview  Goal: Plan of Care Review  Outcome: Ongoing (interventions implemented as appropriate)   07/15/19 0916   Coping/Psychosocial   Plan Of Care Reviewed With patient   Plan of Care Review   Progress improving       Problem: Cardiac Surgery (Adult)  Goal: Signs and Symptoms of Listed Potential Problems Will be Absent, Minimized or Managed (Cardiac Surgery)  Outcome: Ongoing (interventions implemented as appropriate)   07/15/19 0916   Cardiac Surgery   Problems Assessed (Cardiac Surgery) all   Problems Present (Cardiac Surgery) none       Problem: Fall Risk (Adult)  Goal: Identify Related Risk Factors and Signs and Symptoms  Outcome: Ongoing (interventions implemented as appropriate)   07/15/19 0916   Fall Risk   Related Risk Factors (Fall Risk) age-related changes;environment unfamiliar

## 2019-07-15 NOTE — PATIENT CARE CONFERENCE
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]   Admit Date/Time: 7/9/2019 11:31 AM    Primary Diagnosis: Papillary fibroelastoma of heart  Principal Problem: Papillary fibroelastoma of heart    GMLOS: 3.4  Anticipated Discharge Date: 7/15/2019    AM-PAC  Mobility Score: 22    Discharge Planning:  Living Arrangements: house  Concerns To Be Addressed: denies needs/concerns at this time, care coordination/care conferences  Anticipated Discharge Disposition: home with home health services, home with assistance  Type of Home Care Services: nursing  Type of Outpatient Services: other (comments) (denies OP services)    Barriers to Discharge:  Barriers to Discharge: None  Comment: pending lovenox    Participants:  advanced practice provider, nursing, physical therapy

## 2019-07-16 ENCOUNTER — TELEPHONE (OUTPATIENT)
Dept: CARDIOLOGY | Facility: CLINIC | Age: 74
End: 2019-07-16

## 2019-07-16 ENCOUNTER — TELEPHONE (OUTPATIENT)
Dept: SCHEDULING | Facility: CLINIC | Age: 74
End: 2019-07-16

## 2019-07-16 NOTE — TELEPHONE ENCOUNTER
Spoke with patient in detail, advised instead of two cups of coffee immediately post op  And to stick to 4-5 cups of water daily. He is otherwise feeling well and does not have any chest pain or difficulty breathing. He sees Dr. Tompkins, his cardiologist and I recommended he follow up with her and make an appointment since he is established with her especially in the post op period.

## 2019-07-16 NOTE — TELEPHONE ENCOUNTER
Pt calling to speak with Jo regarding his post surgery instructions regarding fluid intake.     Also, pt's cardiologist is not affliated with [facility], pt is requesting a recommendation for new cardiologist within [facility] in [facility] or closer.     Pt can be reached at [phone]

## 2019-07-16 NOTE — TELEPHONE ENCOUNTER
Informed patient's wife that Jo is ok with moving post op appointment to 7/23 at [place]; and that the second appointment is cancelled.  Jo will decide if a second follow up is necessary at 7/23/19 office visit.

## 2019-07-23 ENCOUNTER — OFFICE VISIT (OUTPATIENT)
Dept: CARDIOLOGY | Facility: CLINIC | Age: 74
End: 2019-07-23
Payer: COMMERCIAL

## 2019-07-23 VITALS — HEART RATE: 78 BPM | DIASTOLIC BLOOD PRESSURE: 80 MMHG | SYSTOLIC BLOOD PRESSURE: 122 MMHG

## 2019-07-23 DIAGNOSIS — D15.1 PAPILLARY FIBROELASTOMA: Primary | ICD-10-CM

## 2019-07-23 PROCEDURE — 99024 POSTOP FOLLOW-UP VISIT: CPT | Performed by: PHYSICIAN ASSISTANT

## 2019-07-23 NOTE — LETTER
July 29, 2019     Allison Tompkins MD  [address] [place] [id number]    Patient: Saad Hillman  YOB: 1945  Date of Visit: 7/23/2019      Dear Dr. Tompkins:    Thank you for referring Saad Hillman to me for evaluation. Below are my notes for this consultation.    If you have questions, please do not hesitate to call me. I look forward to following your patient along with you.         Sincerely,        EDILIA Patten        CC: MD Alban Lopez Maysoon M, PA C  7/23/2019  2:33 PM  Signed  CARDIOVASCULAR SURGERY FOLLOW-UP PROGRESS NOTE    Subjective     Mr. Hillman is a 74-year-old male who presents for approximate 2-week follow-up status post minimally invasive excision of a fibro-elastoma from the lateral papillary muscle of the mitral valve.  This was performed on 7/9/2019 by Dr. Mart.  The patient is now recovering at home and currently denies any chest pain, shortness of breath, palpitations or lightheadedness.  He does have mild discomfort primarily at night, after laying in one position for a while.  He denies any fevers or night sweats.  He has not been taking any pain medication for it including any over-the-counter medication such as Tylenol or ibuprofen.  Of note, the patient did have an episode of atrial fibrillation for which EP was consulted while he was in house and amiodarone was initiated.  He was discharged on 200 mg of amiodarone daily for rhythm management as well as Toprol-XL 25 mg twice daily.  He has not had any complaints in this regard.  His appetite is back to baseline.  He is walking daily without complaints and is attempting to increase his walking but says it has been increasingly hot outside and has primarily just been walking in the house.    Objective   /80   Pulse 78   Heart:  regular rate and rhythm, S1, S2 normal, no murmur, click, rub or gallop  Lungs:  clear to auscultation bilaterally  Extremities:  no edema  Incision(s): Sternotomy  incision is well approximated and clean.  There is no erythema, warmth or sternal laxity noted.    Data Review:  PA and lateral chest x-ray performed on 7/15/2019 was negative for evidence of pneumothorax.  Subsegmental atelectasis at the lung bases.  Small bilateral pleural effusions.    Assessment/Plan       Mr. Hillman is a 74-year-old male who presents for approximate 2-week follow-up status post minimally invasive excision of a fibro-elastoma from the lateral papillary muscle of the mitral valve.  This was performed on 7/9/2019 by Dr. Mart.  I reiterated sternal precautions and advised the patient not to lift in excess of 5 pounds for another 2 weeks.  He will be able to drive in 2 weeks and was advised to have a friend or family member with him during the first couple of local trips.  He can then lift up to 10 pounds for 6 weeks thereafter.  I encouraged him to walk more daily particularly in the mornings before it gets hot outside.  Or he can walk indoors on a track.  I also advised him to follow regularly with Dr. Tompkins, his cardiologist. He says he is seeing her next week.  Patient was advised that in 2 weeks he is to complete his furosemide, potassium and magnesium and then not renew the prescriptions as those were temporarily prescribed for 30 days post surgery.  He was advised to contact us with any questions or concerns in the meantime.  We will defer the timing of his next echocardiogram to Dr. Tompkins.

## 2019-07-23 NOTE — PROGRESS NOTES
CARDIOVASCULAR SURGERY FOLLOW-UP PROGRESS NOTE    Subjective     Mr. Hillman is a 74-year-old male who presents for approximate 2-week follow-up status post minimally invasive excision of a fibro-elastoma from the lateral papillary muscle of the mitral valve.  This was performed on 7/9/2019 by Dr. Mart.  The patient is now recovering at home and currently denies any chest pain, shortness of breath, palpitations or lightheadedness.  He does have mild discomfort primarily at night, after laying in one position for a while.  He denies any fevers or night sweats.  He has not been taking any pain medication for it including any over-the-counter medication such as Tylenol or ibuprofen.  Of note, the patient did have an episode of atrial fibrillation for which EP was consulted while he was in house and amiodarone was initiated.  He was discharged on 200 mg of amiodarone daily for rhythm management as well as Toprol-XL 25 mg twice daily.  He has not had any complaints in this regard.  His appetite is back to baseline.  He is walking daily without complaints and is attempting to increase his walking but says it has been increasingly hot outside and has primarily just been walking in the house.    Objective   /80   Pulse 78   Heart:  regular rate and rhythm, S1, S2 normal, no murmur, click, rub or gallop  Lungs:  clear to auscultation bilaterally  Extremities:  no edema  Incision(s): Sternotomy incision is well approximated and clean.  There is no erythema, warmth or sternal laxity noted.    Data Review:  PA and lateral chest x-ray performed on 7/15/2019 was negative for evidence of pneumothorax.  Subsegmental atelectasis at the lung bases.  Small bilateral pleural effusions.    Assessment/Plan       Mr. Hillman is a 74-year-old male who presents for approximate 2-week follow-up status post minimally invasive excision of a fibro-elastoma from the lateral papillary muscle of the mitral valve.  This was performed on  7/9/2019 by Dr. Mart.  I reiterated sternal precautions and advised the patient not to lift in excess of 5 pounds for another 2 weeks.  He will be able to drive in 2 weeks and was advised to have a friend or family member with him during the first couple of local trips.  He can then lift up to 10 pounds for 6 weeks thereafter.  I encouraged him to walk more daily particularly in the mornings before it gets hot outside.  Or he can walk indoors on a track.  I also advised him to follow regularly with Dr. Tompkins, his cardiologist. He says he is seeing her next week.  Patient was advised that in 2 weeks he is to complete his furosemide, potassium and magnesium and then not renew the prescriptions as those were temporarily prescribed for 30 days post surgery.  He was advised to contact us with any questions or concerns in the meantime.  We will defer the timing of his next echocardiogram to Dr. Tompkins.

## 2019-11-13 ENCOUNTER — OFFICE VISIT (OUTPATIENT)
Dept: PRIMARY CARE | Facility: CLINIC | Age: 74
End: 2019-11-13
Payer: COMMERCIAL

## 2019-11-13 VITALS
WEIGHT: 189 LBS | DIASTOLIC BLOOD PRESSURE: 70 MMHG | BODY MASS INDEX: 27.99 KG/M2 | TEMPERATURE: 97.8 F | HEIGHT: 69 IN | HEART RATE: 62 BPM | RESPIRATION RATE: 18 BRPM | OXYGEN SATURATION: 94 % | SYSTOLIC BLOOD PRESSURE: 128 MMHG

## 2019-11-13 DIAGNOSIS — Z23 NEED FOR IMMUNIZATION AGAINST INFLUENZA: Primary | ICD-10-CM

## 2019-11-13 DIAGNOSIS — D15.1 PAPILLARY FIBROELASTOMA OF HEART: ICD-10-CM

## 2019-11-13 DIAGNOSIS — D15.1 ATRIAL MYXOMA: ICD-10-CM

## 2019-11-13 DIAGNOSIS — N40.1 BENIGN PROSTATIC HYPERPLASIA WITH LOWER URINARY TRACT SYMPTOMS, SYMPTOM DETAILS UNSPECIFIED: ICD-10-CM

## 2019-11-13 DIAGNOSIS — I48.0 PAROXYSMAL ATRIAL FIBRILLATION (CMS/HCC): ICD-10-CM

## 2019-11-13 DIAGNOSIS — R60.9 EDEMA, UNSPECIFIED TYPE: ICD-10-CM

## 2019-11-13 DIAGNOSIS — J45.40 MODERATE PERSISTENT ASTHMA, UNSPECIFIED WHETHER COMPLICATED: ICD-10-CM

## 2019-11-13 DIAGNOSIS — I10 ESSENTIAL HYPERTENSION: ICD-10-CM

## 2019-11-13 PROBLEM — G62.9 NEUROPATHY: Status: ACTIVE | Noted: 2019-11-13

## 2019-11-13 PROBLEM — H91.93 BILATERAL HEARING LOSS: Status: ACTIVE | Noted: 2019-11-13

## 2019-11-13 PROCEDURE — 90732 PPSV23 VACC 2 YRS+ SUBQ/IM: CPT | Performed by: INTERNAL MEDICINE

## 2019-11-13 PROCEDURE — 90653 IIV ADJUVANT VACCINE IM: CPT | Performed by: INTERNAL MEDICINE

## 2019-11-13 PROCEDURE — G0009 ADMIN PNEUMOCOCCAL VACCINE: HCPCS | Performed by: INTERNAL MEDICINE

## 2019-11-13 PROCEDURE — 99204 OFFICE O/P NEW MOD 45 MIN: CPT | Mod: 25 | Performed by: INTERNAL MEDICINE

## 2019-11-13 PROCEDURE — G0008 ADMIN INFLUENZA VIRUS VAC: HCPCS | Performed by: INTERNAL MEDICINE

## 2019-11-13 RX ORDER — FLUTICASONE PROPIONATE AND SALMETEROL 250; 50 UG/1; UG/1
1 POWDER RESPIRATORY (INHALATION) 2 TIMES DAILY
COMMUNITY
Start: 2019-06-25 | End: 2020-07-01 | Stop reason: SDUPTHER

## 2019-11-13 RX ORDER — VALSARTAN AND HYDROCHLOROTHIAZIDE 80; 12.5 MG/1; MG/1
1 TABLET, FILM COATED ORAL DAILY
COMMUNITY
Start: 2019-10-30 | End: 2021-04-22 | Stop reason: SDUPTHER

## 2019-11-13 ASSESSMENT — ENCOUNTER SYMPTOMS
DIARRHEA: 0
SHORTNESS OF BREATH: 0
NUMBNESS: 1
FREQUENCY: 0
HEADACHES: 0
CONSTIPATION: 0
ARTHRALGIAS: 1
NAUSEA: 0
FEVER: 0
SORE THROAT: 0
SLEEP DISTURBANCE: 0
DYSURIA: 0
VOMITING: 0
COUGH: 1
DIZZINESS: 0

## 2019-11-13 NOTE — PROGRESS NOTES
Daily Progress Note      Subjective      Patient ID: Saad Hillman is a 74 y.o. male.  Chief Complaint   Patient presents with   • Establish Care     Moved from [facility]   • Edema     The patient saw the Cardiologist for swelling in his hands and feet and had surgery for a growth on his Mitral Valve in July. He continues to have swelling and feels pins and needles in his hands. He has pain in the feet. He states that he has an enlarged prostate and had a biopsy in January. He has skin lesions on his back.     moved from [facility] to [facility].  He has been most bothered by swelling to hands and feet.  He in addition to swelling he has a sense of pins-and-needles of both hands and feet.  Swelling in the feet is worse during the day hands worse in the morning.  He has seen several doctors and ended up seeing a cardiologist.  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  He is recovered well from the surgery.  It was complicated by postoperative atrial fibrillation he has not had a recurrence.  He is recently stopped his amiodarone.    He does not have a history of diabetes that he knows of he was checked for thyroid disease.  That was negative.  He has not had a test for vitamin B12 that he can recall.  He is never been told he has carpal tunnel syndrome.      H/o asthma  Uses advair daily x 7 and bid x 3.  No recent flares         changed valsartan to valsartan hctz as well he has tolerated this well    Swelling in hands and feet    Wake up with pins and needles in hands          The following have been reviewed and updated as appropriate in this visit:       Review of Systems   Constitutional: Negative for fever.   HENT: Positive for postnasal drip. Negative for sore throat.    Respiratory: Positive for cough (with PND). Negative for shortness of breath.    Cardiovascular: Negative  for chest pain.   Gastrointestinal: Negative for constipation, diarrhea, nausea and vomiting.   Genitourinary: Negative for dysuria and frequency.   Musculoskeletal: Positive for arthralgias.   Skin: Negative for rash.   Neurological: Positive for numbness. Negative for dizziness and headaches.   Psychiatric/Behavioral: Negative for sleep disturbance.     Patient Active Problem List   Diagnosis   • Papillary fibroelastoma   • Asthma   • Seasonal allergies   • BPH (benign prostatic hyperplasia)   • Traumatic amputation of finger   • Arthritis   • Medial meniscus tear   • Papillary fibroelastoma of heart   • Junctional bradycardia   • Paroxysmal atrial fibrillation (CMS/HCC)   • HTN (hypertension)   • Hyperlipemia   • Bilateral hearing loss   • Neuropathy   • Edema     Current Outpatient Medications   Medication Sig Dispense Refill   • albuterol HFA (VENTOLIN HFA) 90 mcg/actuation inhaler Inhale 2 puffs every 6 (six) hours as needed.     • fluticasone propion-salmeterol (ADVAIR DISKUS) 250-50 mcg/dose diskus inhaler Inhale 1 puff 2 (two) times a day.     • fluticasone propionate (FLONASE) 50 mcg/actuation nasal spray Administer 1 spray into affected nostril(s) nightly.       • montelukast (SINGULAIR) 10 mg tablet Take 10 mg by mouth nightly.       • rosuvastatin (CRESTOR) 10 mg tablet Take 10 mg by mouth nightly. Instructed to stop 7 days prior to surgery per Dr Mart' office      • valsartan-hydrochlorothiazide (DIOVAN-HCT) 80-12.5 mg per tablet Take 1 tablet by mouth daily.     • metoprolol succinate XL (TOPROL-XL) 25 mg 24 hr tablet Take 1 tablet (25 mg total) by mouth every 12 (twelve) hours. (Patient taking differently: Take 25 mg by mouth daily.  ) 60 tablet 0     No current facility-administered medications for this visit.      Past Medical History:   Diagnosis Date   • Arthritis     b/l shoulders- cortisone injections in 2016   • Asthma    • Atrial myxoma     seen on echo    • BPH (benign prostatic  hyperplasia)     was on meds- resolved after TURP in  2017, no meds currently    • Hypertension    • Lipid disorder    • Medial meniscus tear     left knee- had cortisone injection in 2016   • Seasonal allergies    • 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     Family History   Problem Relation Age of Onset   • Abdominal Aortic Aneurysm Biological Mother    • Hyperlipidemia Biological Mother    • Stroke Biological Father      Past Surgical History:   Procedure Laterality Date   • FINGER SURGERY      s/p traumatic amputation- had repaired    • HERNIA REPAIR  2012    left inguinal hernia repair    • LIPOMA RESECTION  1999    right shoulder x 2 and back of neck    • MITRAL VALVE SURGERY  07/09/2019   • NASAL POLYP EXCISION  1966- 1999    x 4    • TONSILLECTOMY  1948   • TRANSURETHRAL RESECTION OF PROSTATE  2017   • VASECTOMY  1980's     Social History     Socioeconomic History   • Marital status:      Spouse name: Umu    • Number of children: 2   • Years of education: Not on file   • Highest education level: Not on file   Occupational History   • Not on file   Social Needs   • Financial resource strain: Not on file   • Food insecurity:     Worry: Not on file     Inability: Not on file   • Transportation needs:     Medical: Not on file     Non-medical: Not on file   Tobacco Use   • Smoking status: Never Smoker   • Smokeless tobacco: Never Used   Substance and Sexual Activity   • Alcohol use: Yes     Comment: occasional    • Drug use: No   • Sexual activity: Not on file   Lifestyle   • Physical activity:     Days per week: Not on file     Minutes per session: Not on file   • Stress: Not on file   Relationships   • Social connections:     Talks on phone: Not on file     Gets together: Not on file     Attends Orthodoxy service: Not on file     Active member of club or organization: Not on file     Attends meetings of clubs or organizations: Not on file     Relationship status: Not  on file   • Intimate partner violence:     Fear of current or ex partner: Not on file     Emotionally abused: Not on file     Physically abused: Not on file     Forced sexual activity: Not on file   Other Topics Concern   • Not on file   Social History Narrative    Retired Draftsman, , 1 son, 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      Allergies   Allergen Reactions   • Aspirin Other (see comments)     Asthma attack   • Hydrocodone-Acetaminophen GI intolerance     CONSTIPATION     Vitals:    11/13/19 1508   BP: 128/70   Pulse: 62   Resp: 18   Temp: 36.6 °C (97.8 °F)   SpO2: 94%     Objective     Results for orders placed or performed during the hospital encounter of 07/09/19   MRSA Screen, Nares Only Nose   Result Value Ref Range    MRSA DNA, Nares Negative Negative   Repeat ABO/Rh (Type Check)   Result Value Ref Range    ABO A     Rh Factor Positive     History Check Previous type on file    CBC   Result Value Ref Range    WBC 5.38 3.80 - 10.50 K/uL    RBC 4.80 4.50 - 5.80 M/uL    Hemoglobin 14.4 13.7 - 17.5 g/dL    Hematocrit 42.3 40.1 - 51.0 %    MCV 88.1 83.0 - 98.0 fL    MCH 30.0 28.0 - 33.2 pg    MCHC 34.0 32.2 - 36.5 g/dL    RDW 12.8 11.6 - 14.4 %    Platelets 298 150 - 350 K/uL    MPV 9.4 9.4 - 12.4 fL   Antithrombin III Activity   Result Value Ref Range    Antithrombin Activity 94 90 - 120 %   Fibrinogen   Result Value Ref Range    Fibrinogen 320 220 - 480 mg/dL   CBC   Result Value Ref Range    WBC 14.26 (H) 3.80 - 10.50 K/uL    RBC 3.39 (L) 4.50 - 5.80 M/uL    Hemoglobin 10.2 (L) 13.7 - 17.5 g/dL    Hematocrit 30.1 (L) 40.1 - 51.0 %    MCV 88.8 83.0 - 98.0 fL    MCH 30.1 28.0 - 33.2 pg    MCHC 33.9 32.2 - 36.5 g/dL    RDW 13.0 11.6 - 14.4 %    Platelets 277 150 - 350 K/uL    MPV 9.6 9.4 - 12.4 fL   Fibrinogen   Result Value Ref Range    Fibrinogen 191 (L) 220 - 480 mg/dL   CBC   Result Value Ref Range    WBC 15.48 (H) 3.80 - 10.50 K/uL    RBC 3.77 (L) 4.50 - 5.80 M/uL    Hemoglobin 11.3 (L) 13.7 - 17.5 g/dL     Hematocrit 33.6 (L) 40.1 - 51.0 %    MCV 89.1 83.0 - 98.0 fL    MCH 30.0 28.0 - 33.2 pg    MCHC 33.6 32.2 - 36.5 g/dL    RDW 12.8 11.6 - 14.4 %    Platelets 188 150 - 350 K/uL    MPV 9.7 9.4 - 12.4 fL   Fibrinogen   Result Value Ref Range    Fibrinogen 221 220 - 480 mg/dL   Protime-INR   Result Value Ref Range    PT 18.2 (H) 12.2 - 14.5 sec    INR 1.6   INR   PTT   Result Value Ref Range    PTT 59 (H) 23 - 35 sec   TEG Citrated Kaolin   Result Value Ref Range    TEG Specimen Type Post Protamine     Reaction Time 5.0 5.0 - 10.0 min    Clot Kinetics 1.6 1.0 - 3.0 min    ANGLE 68.1 53.0 - 72.0 degrees    Max Amplitude 59.1 50.0 - 70.0 mm   TEG Citrated Kaolin with Heparin   Result Value Ref Range    TEG Specimen Type Post Protamine     Reaction Time (Hep) 5.3 5.0 - 10.0 min    Clot kinetics (Hep)  1.0 - 3.0 min    ANGLE (HEP)  53.0 - 72.0 degrees    Max Amplitude (Hep)  50.0 - 70.0 mm   CBC   Result Value Ref Range    WBC 10.17 3.80 - 10.50 K/uL    RBC 3.96 (L) 4.50 - 5.80 M/uL    Hemoglobin 11.8 (L) 13.7 - 17.5 g/dL    Hematocrit 35.7 (L) 40.1 - 51.0 %    MCV 90.2 83.0 - 98.0 fL    MCH 29.8 28.0 - 33.2 pg    MCHC 33.1 32.2 - 36.5 g/dL    RDW 12.7 11.6 - 14.4 %    Platelets 229 150 - 350 K/uL    MPV 9.4 9.4 - 12.4 fL   Fibrinogen   Result Value Ref Range    Fibrinogen 225 220 - 480 mg/dL   Protime-INR   Result Value Ref Range    PT 17.1 (H) 12.2 - 14.5 sec    INR 1.5   INR   PTT   Result Value Ref Range    PTT 65 (H) 23 - 35 sec   Basic metabolic panel   Result Value Ref Range    Sodium 139 136 - 144 mEQ/L    Potassium 4.2 3.6 - 5.1 mEQ/L    Chloride 103 98 - 109 mEQ/L    CO2 25 22 - 32 mEQ/L    BUN 9 8 - 20 mg/dL    Creatinine 0.8 0.8 - 1.3 mg/dL    Glucose 163 (H) 70 - 99 mg/dL    Calcium 9.1 8.9 - 10.3 mg/dL    eGFR >60.0 >=60.0 mL/min/1.73m*2    Anion Gap 11 3 - 15 mEQ/L   Magnesium   Result Value Ref Range    Magnesium 2.2 1.8 - 2.5 mg/dL   Phosphorus   Result Value Ref Range    Phosphorus 2.5 2.4 - 4.7 mg/dL    Blood Gas, Comprehensive   Result Value Ref Range    pH, Arterial 7.38 7.35 - 7.45    pCO2, Arterial 40 35 - 48 mm Hg    pO2, Arterial 36 (LL) 83 - 100 mm Hg    HCO3, Arterial 23.7 21.0 - 28.0 mEQ/L    Base Excess, Arterial -1.3 mEQ/L    O2 Sat, Arterial 61 (L) 93 - 98 %    TCO2, Arterial 24.9 22.0 - 32.0 mEQ/L    Lactate, Art 1.2 0.4 - 1.6 mmol/L    Glucose, Arterial 110 (H) 65 - 95 mg/dL    Sodium, Arterial 137 136 - 145 mEQ/L    Potassium, Arterial 3.9 3.4 - 4.5 mEQ/L    Ionized Calcium, Arterial 1.29 (H) 1.15 - 1.27 mmol/L    Hemoglobin, Arterial 9.6 (L) 14.0 - 17.5    Hematocrit, Arterial 28 (L) 42 - 52 %    Carboxyhemoglobin 1.4 0.0 - 1.5 %    Methemoglobin 1.1 0.4 - 1.5 %    Source Of Oxygen 10L HFNC     FIO2 10L HFNC    CBC   Result Value Ref Range    WBC 9.80 3.80 - 10.50 K/uL    RBC 3.77 (L) 4.50 - 5.80 M/uL    Hemoglobin 11.2 (L) 13.7 - 17.5 g/dL    Hematocrit 34.0 (L) 40.1 - 51.0 %    MCV 90.2 83.0 - 98.0 fL    MCH 29.7 28.0 - 33.2 pg    MCHC 32.9 32.2 - 36.5 g/dL    RDW 12.8 11.6 - 14.4 %    Platelets 261 150 - 350 K/uL    MPV 9.9 9.4 - 12.4 fL   Protime-INR   Result Value Ref Range    PT 15.7 (H) 12.2 - 14.5 sec    INR 1.3   INR   PTT   Result Value Ref Range    PTT 47 (H) 23 - 35 sec   Fibrinogen   Result Value Ref Range    Fibrinogen 240 220 - 480 mg/dL   Potassium   Result Value Ref Range    Potassium 4.2 3.6 - 5.1 mEQ/L   CBC   Result Value Ref Range    WBC 10.73 (H) 3.80 - 10.50 K/uL    RBC 3.94 (L) 4.50 - 5.80 M/uL    Hemoglobin 11.9 (L) 13.7 - 17.5 g/dL    Hematocrit 34.7 (L) 40.1 - 51.0 %    MCV 88.1 83.0 - 98.0 fL    MCH 30.2 28.0 - 33.2 pg    MCHC 34.3 32.2 - 36.5 g/dL    RDW 12.6 11.6 - 14.4 %    Platelets 254 150 - 350 K/uL    MPV 9.3 (L) 9.4 - 12.4 fL   Potassium   Result Value Ref Range    Potassium 3.7 3.6 - 5.1 mEQ/L   Magnesium   Result Value Ref Range    Magnesium 2.0 1.8 - 2.5 mg/dL   Phosphorus   Result Value Ref Range    Phosphorus 3.0 2.4 - 4.7 mg/dL   Magnesium   Result Value  Ref Range    Magnesium 2.6 (H) 1.8 - 2.5 mg/dL   Potassium   Result Value Ref Range    Potassium 5.5 (H) 3.6 - 5.1 mEQ/L   Potassium   Result Value Ref Range    Potassium 4.2 3.6 - 5.1 mEQ/L   Potassium   Result Value Ref Range    Potassium 4.1 3.6 - 5.1 mEQ/L   Basic metabolic panel   Result Value Ref Range    Sodium 138 136 - 144 mEQ/L    Potassium 3.9 3.6 - 5.1 mEQ/L    Chloride 106 98 - 109 mEQ/L    CO2 23 22 - 32 mEQ/L    BUN 13 8 - 20 mg/dL    Creatinine 0.8 0.8 - 1.3 mg/dL    Glucose 146 (H) 70 - 99 mg/dL    Calcium 8.9 8.9 - 10.3 mg/dL    eGFR >60.0 >=60.0 mL/min/1.73m*2    Anion Gap 9 3 - 15 mEQ/L   CBC   Result Value Ref Range    WBC 10.11 3.80 - 10.50 K/uL    RBC 3.58 (L) 4.50 - 5.80 M/uL    Hemoglobin 10.9 (L) 13.7 - 17.5 g/dL    Hematocrit 32.7 (L) 40.1 - 51.0 %    MCV 91.3 83.0 - 98.0 fL    MCH 30.4 28.0 - 33.2 pg    MCHC 33.3 32.2 - 36.5 g/dL    RDW 12.6 11.6 - 14.4 %    Platelets 230 150 - 350 K/uL    MPV 9.7 9.4 - 12.4 fL   Magnesium   Result Value Ref Range    Magnesium 2.6 (H) 1.8 - 2.5 mg/dL   Magnesium   Result Value Ref Range    Magnesium 2.2 1.8 - 2.5 mg/dL   Magnesium   Result Value Ref Range    Magnesium 2.3 1.8 - 2.5 mg/dL   Phosphorus   Result Value Ref Range    Phosphorus 2.6 2.4 - 4.7 mg/dL   Magnesium   Result Value Ref Range    Magnesium 2.3 1.8 - 2.5 mg/dL   Basic metabolic panel   Result Value Ref Range    Sodium 136 136 - 144 mEQ/L    Potassium 4.0 3.6 - 5.1 mEQ/L    Chloride 105 98 - 109 mEQ/L    CO2 23 22 - 32 mEQ/L    BUN 17 8 - 20 mg/dL    Creatinine 0.9 0.8 - 1.3 mg/dL    Glucose 114 (H) 70 - 99 mg/dL    Calcium 9.0 8.9 - 10.3 mg/dL    eGFR >60.0 >=60.0 mL/min/1.73m*2    Anion Gap 8 3 - 15 mEQ/L   CBC   Result Value Ref Range    WBC 8.65 3.80 - 10.50 K/uL    RBC 3.18 (L) 4.50 - 5.80 M/uL    Hemoglobin 9.5 (L) 13.7 - 17.5 g/dL    Hematocrit 29.1 (L) 40.1 - 51.0 %    MCV 91.5 83.0 - 98.0 fL    MCH 29.9 28.0 - 33.2 pg    MCHC 32.6 32.2 - 36.5 g/dL    RDW 13.2 11.6 - 14.4 %     Platelets 209 150 - 350 K/uL    MPV 10.5 9.4 - 12.4 fL   Calcium, ionized   Result Value Ref Range    Ionized [place], Venous 1.20 1.15 - 1.27 mmol/L   Oxygen saturation, venous   Result Value Ref Range    Oxygen Saturation, Venous 61.4 (H) 30.0 - 60.0 %   Oxygen saturation, venous   Result Value Ref Range    Oxygen Saturation, Venous 65.5 (H) 30.0 - 60.0 %   CBC   Result Value Ref Range    WBC 6.90 3.80 - 10.50 K/uL    RBC 3.18 (L) 4.50 - 5.80 M/uL    Hemoglobin 9.6 (L) 13.7 - 17.5 g/dL    Hematocrit 29.0 (L) 40.1 - 51.0 %    MCV 91.2 83.0 - 98.0 fL    MCH 30.2 28.0 - 33.2 pg    MCHC 33.1 32.2 - 36.5 g/dL    RDW 13.3 11.6 - 14.4 %    Platelets 194 150 - 350 K/uL    MPV 10.6 9.4 - 12.4 fL   Basic metabolic panel   Result Value Ref Range    Sodium 132 (L) 136 - 144 mEQ/L    Potassium 4.8 3.6 - 5.1 mEQ/L    Chloride 104 98 - 109 mEQ/L    CO2 23 22 - 32 mEQ/L    BUN 18 8 - 20 mg/dL    Creatinine 0.9 0.8 - 1.3 mg/dL    Glucose 119 (H) 70 - 99 mg/dL    Calcium 9.0 8.9 - 10.3 mg/dL    eGFR >60.0 >=60.0 mL/min/1.73m*2    Anion Gap 5 3 - 15 mEQ/L   Protime-INR   Result Value Ref Range    PT 14.6 (H) 12.2 - 14.5 sec    INR 1.2   INR   Magnesium   Result Value Ref Range    Magnesium 2.0 1.8 - 2.5 mg/dL   Basic metabolic panel   Result Value Ref Range    Sodium 133 (L) 136 - 144 mEQ/L    Potassium 4.0 3.6 - 5.1 mEQ/L    Chloride 101 98 - 109 mEQ/L    CO2 25 22 - 32 mEQ/L    BUN 10 8 - 20 mg/dL    Creatinine 0.7 (L) 0.8 - 1.3 mg/dL    Glucose 105 (H) 70 - 99 mg/dL    Calcium 8.6 (L) 8.9 - 10.3 mg/dL    eGFR >60.0 >=60.0 mL/min/1.73m*2    Anion Gap 7 3 - 15 mEQ/L   CBC   Result Value Ref Range    WBC 5.95 3.80 - 10.50 K/uL    RBC 3.32 (L) 4.50 - 5.80 M/uL    Hemoglobin 9.8 (L) 13.7 - 17.5 g/dL    Hematocrit 30.2 (L) 40.1 - 51.0 %    MCV 91.0 83.0 - 98.0 fL    MCH 29.5 28.0 - 33.2 pg    MCHC 32.5 32.2 - 36.5 g/dL    RDW 13.0 11.6 - 14.4 %    Platelets 206 150 - 350 K/uL    MPV 10.6 9.4 - 12.4 fL   Magnesium   Result Value Ref  Range    Magnesium 1.9 1.8 - 2.5 mg/dL   Basic metabolic panel   Result Value Ref Range    Sodium 136 136 - 144 mEQ/L    Potassium 3.9 3.6 - 5.1 mEQ/L    Chloride 102 98 - 109 mEQ/L    CO2 28 22 - 32 mEQ/L    BUN 9 8 - 20 mg/dL    Creatinine 0.7 (L) 0.8 - 1.3 mg/dL    Glucose 97 70 - 99 mg/dL    Calcium 8.9 8.9 - 10.3 mg/dL    eGFR >60.0 >=60.0 mL/min/1.73m*2    Anion Gap 6 3 - 15 mEQ/L   CBC   Result Value Ref Range    WBC 5.26 3.80 - 10.50 K/uL    RBC 3.71 (L) 4.50 - 5.80 M/uL    Hemoglobin 11.1 (L) 13.7 - 17.5 g/dL    Hematocrit 32.6 (L) 40.1 - 51.0 %    MCV 87.9 83.0 - 98.0 fL    MCH 29.9 28.0 - 33.2 pg    MCHC 34.0 32.2 - 36.5 g/dL    RDW 12.9 11.6 - 14.4 %    Platelets 268 150 - 350 K/uL    MPV 10.1 9.4 - 12.4 fL   Magnesium   Result Value Ref Range    Magnesium 1.9 1.8 - 2.5 mg/dL   Basic metabolic panel   Result Value Ref Range    Sodium 139 136 - 144 mEQ/L    Potassium 4.2 3.6 - 5.1 mEQ/L    Chloride 105 98 - 109 mEQ/L    CO2 25 22 - 32 mEQ/L    BUN 8 8 - 20 mg/dL    Creatinine 0.8 0.8 - 1.3 mg/dL    Glucose 99 70 - 99 mg/dL    Calcium 9.4 8.9 - 10.3 mg/dL    eGFR >60.0 >=60.0 mL/min/1.73m*2    Anion Gap 9 3 - 15 mEQ/L   CBC   Result Value Ref Range    WBC 6.23 3.80 - 10.50 K/uL    RBC 3.91 (L) 4.50 - 5.80 M/uL    Hemoglobin 11.6 (L) 13.7 - 17.5 g/dL    Hematocrit 35.3 (L) 40.1 - 51.0 %    MCV 90.3 83.0 - 98.0 fL    MCH 29.7 28.0 - 33.2 pg    MCHC 32.9 32.2 - 36.5 g/dL    RDW 13.3 11.6 - 14.4 %    Platelets 287 150 - 350 K/uL    MPV 9.9 9.4 - 12.4 fL   Magnesium   Result Value Ref Range    Magnesium 2.0 1.8 - 2.5 mg/dL   ECG 12 lead   Result Value Ref Range    Ventricular rate 68     Atrial rate 68     AL Interval 134     QRS duration 92     QT Interval 378     QTC Calculation(Bazett) 401     P Axis 79     R Axis 48     T Wave Axis 77    ECG 12 lead   Result Value Ref Range    Ventricular rate 72     Atrial rate 72     AL Interval 156     QRS duration 92     QT Interval 410     QTC Calculation(Bazett) 448      P Axis 70     R Axis 76     T Wave Axis 78    ECG 12 lead   Result Value Ref Range    Ventricular rate 68     Atrial rate 70     QRS duration 88     QT Interval 398     QTC Calculation(Bazett) 423     R Axis 55     T Wave Axis 73    ECG 12 lead   Result Value Ref Range    Ventricular rate 75     Atrial rate 234     QRS duration 78     QT Interval 370     QTC Calculation(Bazett) 413     R Axis 54     T Wave Axis 67    ECG 12 lead   Result Value Ref Range    Ventricular rate 59     Atrial rate 58     QRS duration 78     QT Interval 382     QTC Calculation(Bazett) 378     R Axis 54     T Wave Axis 69    ECG 12 lead   Result Value Ref Range    Ventricular rate 66     Atrial rate 66     NH Interval 152     QRS duration 106     QT Interval 396     QTC Calculation(Bazett) 415     R Axis 58     T Wave Axis 69    ECG 12 lead   Result Value Ref Range    Ventricular rate 62     Atrial rate 62     NH Interval 102     QRS duration 102     QT Interval 394     QTC Calculation(Bazett) 399     P Axis -72     R Axis 61     T Wave Axis 79    ECG 12 lead   Result Value Ref Range    Ventricular rate 69     Atrial rate 32     QRS duration 120     QT Interval 410     QTC Calculation(Bazett) 439     R Axis -54     T Wave Axis 96    ECG 12 lead   Result Value Ref Range    Ventricular rate 74     Atrial rate 312     QRS duration 124     QT Interval 402     QTC Calculation(Bazett) 446     R Axis -37     T Wave Axis 102    ECG 12 lead   Result Value Ref Range    Ventricular rate 77     Atrial rate 86     QRS duration 88     QT Interval 436     QTC Calculation(Bazett) 493     R Axis 41     T Wave Axis 62    ECG 12 lead   Result Value Ref Range    Ventricular rate 80     Atrial rate 214     QRS duration 94     QT Interval 386     QTC Calculation(Bazett) 445     R Axis 55     T Wave Axis 68    Prepare platelets 1 Unit = 4-6 random donor platelets:: 1 Units Transfusion indications: Pre-Op, major surgery with bleeding risk   Result Value Ref  Range    Product Code M2283S90     Unit ID [id number]     Unit ABO A     Unit RH Positive     Product Status PT     Unit Expiration Date/Time 201907092359     ISBT Code 6200    Prepare RBC: 2 Units Transfusion indications: Pre-OP major surgery with bleeding risk   Result Value Ref Range    Product Code S2662P67     Unit ID [id number]     Unit ABO A     Unit RH Positive     Crossmatch Compatible     Product Status RE     Unit Expiration Date/Time 201908062359     ISBT Code 6200     Product Code U0784L53     Unit ID [id number]     Unit ABO A     Unit RH Positive     Crossmatch Compatible     Product Status RE     Unit Expiration Date/Time 201908062359     ISBT Code 6200    Prepare platelets 1 Unit = 4-6 random donor platelets:: 1 Units Transfusion indications: Planned Cardiovascular Surgery   Result Value Ref Range    Product Code N1458L58     Unit ID [id number]     Unit ABO O     Unit RH Positive     Product Status PT     Unit Expiration Date/Time 201907092359     ISBT Code 5100    Pathology Tissue Exam   Result Value Ref Range    Case Report       Surgical Pathology                                Case: [id number]                                  Authorizing Provider:  José Mart MD Collected:           07/09/2019 1630              Ordering Location:     [facility]    Received:            07/09/2019 1638                                     Operating Room                                                               Pathologist:           Yordy Caicedo MD                                                          Specimen:    Mitral Valve, papillary fibroelastoma                                                      Final Diagnosis       Soft tissue, mitral valve, excision:  Papillary fibroelastoma.      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  The specimen is submitted in cassette A1.    Saad Lund, Path. Asst.([facility])    H/E: 1     POCT Arterial Blood Gas   Result Value Ref Range    pH, Arterial 7.43 7.35 - 7.45 pH    pCO2, Arterial 40 35 - 48 mm Hg    pO2, Arterial 187 (H) 83 - 100 mm Hg    HCO3, Arterial 27 21 - 28 mEQ/L    Base Excess, Arterial 2.0 mEQ/L    O2 Sat, Arterial 100 (H) 93 - 98 %    TCO2, Arterial 28 22 - 32 mEQ/L    Lactate, Arterial 1.0 0.4 - 1.6 mmol/L    Glucose, Arterial 123 (H) 65 - 95 mg/dL    Sodium, Arterial 135 (L) 136 - 145 mEQ/L    Potassium, Arterial 3.7 3.4 - 4.5 mEQ/L    Ionized Calcium, Arterial 1.22 1.15 - 1.27 mmol/L    Hematocrit, Arterial 46 36 - 48 %    Patient Temperature 98.6 97.0 - 99.0 °F    POC Test POC    POCT Heparin Dose Response   Result Value Ref Range    POCT HDR Target  sec    POCT HDR AVG ACT 1&2 363 sec    POCT HDR AVG ACT 3&4 266 sec    POCT HDR AVG ACT 5&6 138 sec    POCT HDR BASELINE .0 sec    POCT HDR PROJECTED HEPARIN CONC 3.1 mg/kg    POCT HDR SLOPE 80 63 - 131 sec/unit/ml    POC Test POC    POCT Arterial Blood Gas   Result Value Ref Range    pH, Arterial 7.35 7.35 - 7.45 pH    pCO2, Arterial 50 (H) 35 - 48 mm Hg    pO2, Arterial >550 (H) 83 - 100 mm Hg    HCO3, Arterial 28 21 - 28 mEQ/L    Base Excess, Arterial 1.6 mEQ/L    O2 Sat, Arterial 100 (H) 93 - 98 %    TCO2, Arterial 29 22 - 32 mEQ/L    Lactate, Arterial 0.9 0.4 - 1.6 mmol/L    Glucose, Arterial 123 (H) 65 - 95 mg/dL    Sodium, Arterial 123 (LL) 136 - 145 mEQ/L    Potassium, Arterial 4.4 3.4 - 4.5 mEQ/L    Ionized Calcium, Arterial 0.87 (L) 1.15 - 1.27 mmol/L    Hematocrit, Arterial 26 (L) 36 - 48 %    Patient Temperature 98.6 97.0 - 99.0 °F    POC Test POC    POCT Arterial Blood Gas "   Result Value Ref Range    pH, Arterial 7.43 7.35 - 7.45 pH    pCO2, Arterial 38 35 - 48 mm Hg    pO2, Arterial 262 (H) 83 - 100 mm Hg    HCO3, Arterial 25 21 - 28 mEQ/L    Base Excess, Arterial 0.9 mEQ/L    O2 Sat, Arterial 100 (H) 93 - 98 %    TCO2, Arterial 26 22 - 32 mEQ/L    Lactate, Arterial 1.7 (H) 0.4 - 1.6 mmol/L    Glucose, Arterial 175 (H) 65 - 95 mg/dL    Sodium, Arterial 129 (L) 136 - 145 mEQ/L    Potassium, Arterial 4.8 (H) 3.4 - 4.5 mEQ/L    Ionized Calcium, Arterial 0.94 (L) 1.15 - 1.27 mmol/L    Hematocrit, Arterial 30 (L) 36 - 48 %    Patient Temperature 98.6 97.0 - 99.0 °F    POC Test POC    POCT Arterial Blood Gas   Result Value Ref Range    pH, Arterial 7.38 7.35 - 7.45 pH    pCO2, Arterial 45 35 - 48 mm Hg    pO2, Arterial 312 (H) 83 - 100 mm Hg    HCO3, Arterial 27 21 - 28 mEQ/L    Base Excess, Arterial 1.1 mEQ/L    O2 Sat, Arterial 100 (H) 93 - 98 %    TCO2, Arterial 28 22 - 32 mEQ/L    Lactate, Arterial 1.6 0.4 - 1.6 mmol/L    Glucose, Arterial 170 (H) 65 - 95 mg/dL    Sodium, Arterial 132 (L) 136 - 145 mEQ/L    Potassium, Arterial 4.2 3.4 - 4.5 mEQ/L    Ionized Calcium, Arterial 1.36 (H) 1.15 - 1.27 mmol/L    Hematocrit, Arterial 34 (L) 36 - 48 %    Patient Temperature 98.6 97.0 - 99.0 °F    POC Test POC    POCT Arterial Blood Gas   Result Value Ref Range    pH, Arterial 7.34 (L) 7.35 - 7.45 pH    pCO2, Arterial 48 35 - 48 mm Hg    pO2, Arterial 138 (H) 83 - 100 mm Hg    HCO3, Arterial 26 21 - 28 mEQ/L    Base Excess, Arterial -0.3 mEQ/L    O2 Sat, Arterial 99 (H) 93 - 98 %    TCO2, Arterial 27 22 - 32 mEQ/L    Lactate, Arterial 1.2 0.4 - 1.6 mmol/L    Glucose, Arterial 164 (H) 65 - 95 mg/dL    Sodium, Arterial 135 (L) 136 - 145 mEQ/L    Potassium, Arterial 4.0 3.4 - 4.5 mEQ/L    Ionized Calcium, Arterial 1.27 1.15 - 1.27 mmol/L    Hematocrit, Arterial 36 36 - 48 %    Patient Temperature 98.6 97.0 - 99.0 °F    POC Test POC    POCT Arterial Blood Gas   Result Value Ref Range    pH, Arterial  7.34 (L) 7.35 - 7.45 pH    pCO2, Arterial 50 (H) 35 - 48 mm Hg    pO2, Arterial 311 (H) 83 - 100 mm Hg    HCO3, Arterial 27 21 - 28 mEQ/L    Base Excess, Arterial 0.6 mEQ/L    O2 Sat, Arterial 100 (H) 93 - 98 %    TCO2, Arterial 29 22 - 32 mEQ/L    Lactate, Arterial 1.0 0.4 - 1.6 mmol/L    Glucose, Arterial 148 (H) 65 - 95 mg/dL    Potassium, Arterial 3.6 3.4 - 4.5 mEQ/L    Ionized Calcium, Arterial 1.21 1.15 - 1.27 mmol/L    Hematocrit, Arterial 38 36 - 48 %    Patient Temperature 98.6 97.0 - 99.0 °F    POC Test POC    POCT Arterial Blood Gas   Result Value Ref Range    pH, Arterial 7.44 7.35 - 7.45 pH    pCO2, Arterial 41 35 - 48 mm Hg    pO2, Arterial 321 (H) 83 - 100 mm Hg    HCO3, Arterial 28 21 - 28 mEQ/L    Base Excess, Arterial 3.3 mEQ/L    O2 Sat, Arterial 100 (H) 93 - 98 %    TCO2, Arterial 29 22 - 32 mEQ/L    Lactate, Arterial 1.0 0.4 - 1.6 mmol/L    Glucose, Arterial 163 (H) 65 - 95 mg/dL    Potassium, Arterial 4.0 3.4 - 4.5 mEQ/L    Ionized Calcium, Arterial 1.17 1.15 - 1.27 mmol/L    Hematocrit, Arterial 37 36 - 48 %    Patient Temperature 98.6 97.0 - 99.0 °F    POC Test POC    POCT Arterial Blood Gas   Result Value Ref Range    pH, Arterial 7.42 7.35 - 7.45 pH    pCO2, Arterial 40 35 - 48 mm Hg    pO2, Arterial 407 (H) 83 - 100 mm Hg    HCO3, Arterial 26 21 - 28 mEQ/L    Base Excess, Arterial 1.3 mEQ/L    O2 Sat, Arterial 100 (H) 93 - 98 %    TCO2, Arterial 27 22 - 32 mEQ/L    Lactate, Arterial 0.9 0.4 - 1.6 mmol/L    Glucose, Arterial 159 (H) 65 - 95 mg/dL    Potassium, Arterial 3.9 3.4 - 4.5 mEQ/L    Ionized Calcium, Arterial 1.19 1.15 - 1.27 mmol/L    Hematocrit, Arterial 38 36 - 48 %    Patient Temperature 98.6 97.0 - 99.0 °F    POC Test POC    POCT Arterial Blood Gas   Result Value Ref Range    pH, Arterial 7.40 7.35 - 7.45 pH    pCO2, Arterial 41 35 - 48 mm Hg    pO2, Arterial 140 (H) 83 - 100 mm Hg    HCO3, Arterial 25 21 - 28 mEQ/L    Base Excess, Arterial 0.5 mEQ/L    O2 Sat, Arterial 99 (H)  93 - 98 %    TCO2, Arterial 27 22 - 32 mEQ/L    Lactate, Arterial 0.7 0.4 - 1.6 mmol/L    Glucose, Arterial 156 (H) 65 - 95 mg/dL    Potassium, Arterial 3.7 3.4 - 4.5 mEQ/L    Ionized Calcium, Arterial 1.17 1.15 - 1.27 mmol/L    Hematocrit, Arterial 38 36 - 48 %    Patient Temperature 98.6 97.0 - 99.0 °F    POC Test POC    POCT Arterial Blood Gas   Result Value Ref Range    pH, Arterial 7.41 7.35 - 7.45 pH    pCO2, Arterial 41 35 - 48 mm Hg    pO2, Arterial 111 (H) 83 - 100 mm Hg    HCO3, Arterial 26 21 - 28 mEQ/L    Base Excess, Arterial 1.2 mEQ/L    O2 Sat, Arterial 98 93 - 98 %    TCO2, Arterial 27 22 - 32 mEQ/L    Lactate, Arterial 0.9 0.4 - 1.6 mmol/L    Glucose, Arterial 170 (H) 65 - 95 mg/dL    Potassium, Arterial 4.0 3.4 - 4.5 mEQ/L    Ionized Calcium, Arterial 1.20 1.15 - 1.27 mmol/L    Hematocrit, Arterial 40 36 - 48 %    Patient Temperature 98.6 97.0 - 99.0 °F    POC Test POC    POCT Arterial Blood Gas   Result Value Ref Range    pH, Arterial 7.47 (H) 7.35 - 7.45 pH    pCO2, Arterial 35 35 - 48 mm Hg    pO2, Arterial 117 (H) 83 - 100 mm Hg    HCO3, Arterial 26 21 - 28 mEQ/L    Base Excess, Arterial 2.0 mEQ/L    O2 Sat, Arterial 99 (H) 93 - 98 %    TCO2, Arterial 27 22 - 32 mEQ/L    Lactate, Arterial 1.0 0.4 - 1.6 mmol/L    Glucose, Arterial 171 (H) 65 - 95 mg/dL    Potassium, Arterial 3.8 3.4 - 4.5 mEQ/L    Ionized Calcium, Arterial 1.20 1.15 - 1.27 mmol/L    Hematocrit, Arterial 39 36 - 48 %    Patient Temperature 98.6 97.0 - 99.0 °F    POC Test POC    POCT Arterial Blood Gas   Result Value Ref Range    pH, Arterial 7.50 (H) 7.35 - 7.45 pH    pCO2, Arterial 31 (L) 35 - 48 mm Hg    pO2, Arterial 123 (H) 83 - 100 mm Hg    HCO3, Arterial 24 21 - 28 mEQ/L    Base Excess, Arterial 1.5 mEQ/L    O2 Sat, Arterial 99 (H) 93 - 98 %    TCO2, Arterial 25 22 - 32 mEQ/L    Lactate, Arterial 1.0 0.4 - 1.6 mmol/L    Glucose, Arterial 133 (H) 65 - 95 mg/dL    Potassium, Arterial 3.4 3.4 - 4.5 mEQ/L    Ionized Calcium,  Arterial 1.18 1.15 - 1.27 mmol/L    Hematocrit, Arterial 38 36 - 48 %    Patient Temperature 98.6 97.0 - 99.0 °F    POC Test POC    POCT Arterial Blood Gas   Result Value Ref Range    pH, Arterial 7.42 7.35 - 7.45 pH    pCO2, Arterial 40 35 - 48 mm Hg    pO2, Arterial 135 (H) 83 - 100 mm Hg    HCO3, Arterial 26 21 - 28 mEQ/L    Base Excess, Arterial 1.3 mEQ/L    O2 Sat, Arterial 99 (H) 93 - 98 %    TCO2, Arterial 27 22 - 32 mEQ/L    Lactate, Arterial 1.2 0.4 - 1.6 mmol/L    Glucose, Arterial 137 (H) 65 - 95 mg/dL    Potassium, Arterial 3.4 3.4 - 4.5 mEQ/L    Ionized Calcium, Arterial 1.22 1.15 - 1.27 mmol/L    Hematocrit, Arterial 36 36 - 48 %    Patient Temperature 98.6 97.0 - 99.0 °F    POC Test POC    POCT Arterial Blood Gas   Result Value Ref Range    pH, Arterial 7.36 7.35 - 7.45 pH    pCO2, Arterial 47 35 - 48 mm Hg    pO2, Arterial 112 (H) 83 - 100 mm Hg    HCO3, Arterial 27 21 - 28 mEQ/L    Base Excess, Arterial 0.7 mEQ/L    O2 Sat, Arterial 98 93 - 98 %    TCO2, Arterial 28 22 - 32 mEQ/L    Lactate, Arterial 0.9 0.4 - 1.6 mmol/L    Glucose, Arterial 130 (H) 65 - 95 mg/dL    Potassium, Arterial 3.8 3.4 - 4.5 mEQ/L    Ionized Calcium, Arterial 1.26 1.15 - 1.27 mmol/L    Hematocrit, Arterial 37 36 - 48 %    Patient Temperature 98.6 97.0 - 99.0 °F    POC Test POC    POCT Arterial Blood Gas   Result Value Ref Range    pH, Arterial 7.37 7.35 - 7.45 pH    pCO2, Arterial 44 35 - 48 mm Hg    pO2, Arterial 63 (L) 83 - 100 mm Hg    HCO3, Arterial 25 21 - 28 mEQ/L    Base Excess, Arterial -0.1 mEQ/L    O2 Sat, Arterial 91 (L) 93 - 98 %    TCO2, Arterial 27 22 - 32 mEQ/L    Lactate, Arterial 0.9 0.4 - 1.6 mmol/L    Glucose, Arterial 142 (H) 65 - 95 mg/dL    Potassium, Arterial 4.3 3.4 - 4.5 mEQ/L    Ionized Calcium, Arterial 1.25 1.15 - 1.27 mmol/L    Hematocrit, Arterial 36 36 - 48 %    Patient Temperature 98.6 97.0 - 99.0 °F    POC Test POC    POCT Arterial Blood Gas   Result Value Ref Range    pH, Arterial 7.37 7.35  - 7.45 pH    pCO2, Arterial 43 35 - 48 mm Hg    pO2, Arterial 124 (H) 83 - 100 mm Hg    HCO3, Arterial 25 21 - 28 mEQ/L    Base Excess, Arterial -0.5 mEQ/L    O2 Sat, Arterial 99 (H) 93 - 98 %    TCO2, Arterial 26 22 - 32 mEQ/L    Lactate, Arterial 0.9 0.4 - 1.6 mmol/L    Glucose, Arterial 144 (H) 65 - 95 mg/dL    Potassium, Arterial 4.0 3.4 - 4.5 mEQ/L    Ionized Calcium, Arterial 1.24 1.15 - 1.27 mmol/L    Hematocrit, Arterial 37 36 - 48 %    Patient Temperature 98.6 97.0 - 99.0 °F    POC Test POC    POCT Activated Clotting Time-HR   Result Value Ref Range    POCT ACT- 100 - 140 sec    POC Test POC    POCT HPT/HR-ACT   Result Value Ref Range    POCT PROTOCOL HEP CONC 3.1 mg/kg    POCT ACT- (H) 100 - 140 sec    POCT HEP TEST CONC 3.0 mg/kg    POCT TOTAL HEPARIN REQD 1,366.0 units    POCT PROTAMINE DOSE 286.0 mg    POC Test POC    POCT Activated Clotting Time-HR   Result Value Ref Range    POCT ACT- (H) 100 - 140 sec    POC Test POC    POCT Arterial Blood Gas   Result Value Ref Range    pH, Arterial 7.43 7.35 - 7.45 pH    pCO2, Arterial 39 35 - 48 mm Hg    pO2, Arterial 80 (L) 83 - 100 mm Hg    HCO3, Arterial 26 21 - 28 mEQ/L    Base Excess, Arterial 1.5 mEQ/L    O2 Sat, Arterial 96 93 - 98 %    TCO2, Arterial 27 22 - 32 mEQ/L    Lactate, Arterial 1.2 0.4 - 1.6 mmol/L    Glucose, Arterial 129 (H) 65 - 95 mg/dL    Potassium, Arterial 4.4 3.4 - 4.5 mEQ/L    Ionized Calcium, Arterial 1.26 1.15 - 1.27 mmol/L    Hematocrit, Arterial 37 36 - 48 %    Patient Temperature 98.6 97.0 - 99.0 °F    POC Test POC    POCT Arterial Blood Gas   Result Value Ref Range    pH, Arterial 7.38 7.35 - 7.45 pH    pCO2, Arterial 42 35 - 48 mm Hg    pO2, Arterial 155 (H) 83 - 100 mm Hg    HCO3, Arterial 25 21 - 28 mEQ/L    Base Excess, Arterial -0.4 mEQ/L    O2 Sat, Arterial 99 (H) 93 - 98 %    TCO2, Arterial 26 22 - 32 mEQ/L    Lactate, Arterial 1.0 0.4 - 1.6 mmol/L    Glucose, Arterial 133 (H) 65 - 95 mg/dL    Potassium,  Arterial 4.1 3.4 - 4.5 mEQ/L    Ionized Calcium, Arterial 1.23 1.15 - 1.27 mmol/L    Hematocrit, Arterial 36 36 - 48 %    Patient Temperature 98.6 97.0 - 99.0 °F    POC Test POC    POCT Arterial Blood Gas   Result Value Ref Range    pH, Arterial 7.44 7.35 - 7.45 pH    pCO2, Arterial 35 35 - 48 mm Hg    pO2, Arterial 94 83 - 100 mm Hg    HCO3, Arterial 24 21 - 28 mEQ/L    Base Excess, Arterial -0.1 mEQ/L    O2 Sat, Arterial 98 93 - 98 %    TCO2, Arterial 25 22 - 32 mEQ/L    Lactate, Arterial 0.9 0.4 - 1.6 mmol/L    Glucose, Arterial 125 (H) 65 - 95 mg/dL    Potassium, Arterial 4.1 3.4 - 4.5 mEQ/L    Ionized Calcium, Arterial 1.21 1.15 - 1.27 mmol/L    Hematocrit, Arterial 34 (L) 36 - 48 %    Patient Temperature 98.6 97.0 - 99.0 °F    POC Test POC    POCT Glucose   Result Value Ref Range    POCT Bedside Glucose 115 (H) 70 - 99 mg/dL    POC Test POC    POCT Arterial Blood Gas   Result Value Ref Range    pH, Arterial 7.48 (H) 7.35 - 7.45 pH    pCO2, Arterial 32 (L) 35 - 48 mm Hg    pO2, Arterial 69 (L) 83 - 100 mm Hg    HCO3, Arterial 24 21 - 28 mEQ/L    Base Excess, Arterial 0.7 mEQ/L    O2 Sat, Arterial 95 93 - 98 %    TCO2, Arterial 25 22 - 32 mEQ/L    Lactate, Arterial 1.0 0.4 - 1.6 mmol/L    Glucose, Arterial 157 (H) 65 - 95 mg/dL    Potassium, Arterial 4.1 3.4 - 4.5 mEQ/L    Ionized Calcium, Arterial 1.21 1.15 - 1.27 mmol/L    Hematocrit, Arterial 35 (L) 36 - 48 %    Patient Temperature 98.6 97.0 - 99.0 °F    POC Test POC    POCT Arterial Blood Gas   Result Value Ref Range    pH, Arterial 7.46 (H) 7.35 - 7.45 pH    pCO2, Arterial 32 (L) 35 - 48 mm Hg    pO2, Arterial 76 (L) 83 - 100 mm Hg    HCO3, Arterial 23 21 - 28 mEQ/L    Base Excess, Arterial -0.5 mEQ/L    O2 Sat, Arterial 96 93 - 98 %    TCO2, Arterial 24 22 - 32 mEQ/L    Lactate, Arterial 1.2 0.4 - 1.6 mmol/L    Glucose, Arterial 180 (H) 65 - 95 mg/dL    Potassium, Arterial 4.1 3.4 - 4.5 mEQ/L    Ionized Calcium, Arterial 1.20 1.15 - 1.27 mmol/L     Hematocrit, Arterial 34 (L) 36 - 48 %    Patient Temperature 98.6 97.0 - 99.0 °F    POC Test POC    POCT Arterial Blood Gas   Result Value Ref Range    pH, Arterial 7.48 (H) 7.35 - 7.45 pH    pCO2, Arterial 30 (L) 35 - 48 mm Hg    pO2, Arterial 76 (L) 83 - 100 mm Hg    HCO3, Arterial 22 21 - 28 mEQ/L    Base Excess, Arterial -0.5 mEQ/L    O2 Sat, Arterial 96 93 - 98 %    TCO2, Arterial 23 22 - 32 mEQ/L    Lactate, Arterial 2.1 (H) 0.4 - 1.6 mmol/L    Glucose, Arterial 142 (H) 65 - 95 mg/dL    Potassium, Arterial 3.9 3.4 - 4.5 mEQ/L    Ionized Calcium, Arterial 1.21 1.15 - 1.27 mmol/L    Hematocrit, Arterial 35 (L) 36 - 48 %    Patient Temperature 98.6 97.0 - 99.0 °F    POC Test POC    POCT Arterial Blood Gas   Result Value Ref Range    pH, Arterial 7.45 7.35 - 7.45 pH    pCO2, Arterial 34 (L) 35 - 48 mm Hg    pO2, Arterial 112 (H) 83 - 100 mm Hg    HCO3, Arterial 24 21 - 28 mEQ/L    Base Excess, Arterial -0.1 mEQ/L    O2 Sat, Arterial 99 (H) 93 - 98 %    TCO2, Arterial 25 22 - 32 mEQ/L    Lactate, Arterial 1.4 0.4 - 1.6 mmol/L    Glucose, Arterial 132 (H) 65 - 95 mg/dL    Potassium, Arterial 3.9 3.4 - 4.5 mEQ/L    Ionized Calcium, Arterial 1.19 1.15 - 1.27 mmol/L    Hematocrit, Arterial 34 (L) 36 - 48 %    Patient Temperature 98.6 97.0 - 99.0 °F    POC Test POC    POCT Arterial Blood Gas   Result Value Ref Range    pH, Arterial 7.46 (H) 7.35 - 7.45 pH    pCO2, Arterial 32 (L) 35 - 48 mm Hg    pO2, Arterial 91 83 - 100 mm Hg    HCO3, Arterial 23 21 - 28 mEQ/L    Base Excess, Arterial -0.6 mEQ/L    O2 Sat, Arterial 97 93 - 98 %    TCO2, Arterial 24 22 - 32 mEQ/L    Lactate, Arterial 1.7 (H) 0.4 - 1.6 mmol/L    Glucose, Arterial 152 (H) 65 - 95 mg/dL    Potassium, Arterial 4.2 3.4 - 4.5 mEQ/L    Ionized Calcium, Arterial 1.23 1.15 - 1.27 mmol/L    Hematocrit, Arterial 33 (L) 36 - 48 %    Patient Temperature 98.6 97.0 - 99.0 °F    POC Test POC    POCT Glucose   Result Value Ref Range    POCT Bedside Glucose 102 (H) 70 -  99 mg/dL    POC Test POC    POCT Glucose   Result Value Ref Range    POCT Bedside Glucose 100 (H) 70 - 99 mg/dL    POC Test POC    POCT Glucose   Result Value Ref Range    POCT Bedside Glucose 110 (H) 70 - 99 mg/dL    POC Test POC    POCT Arterial Blood Gas   Result Value Ref Range    pH, Arterial 7.41 7.35 - 7.45 pH    pCO2, Arterial 40 35 - 48 mm Hg    pO2, Arterial 74 (L) 83 - 100 mm Hg    HCO3, Arterial 25 21 - 28 mEQ/L    Base Excess, Arterial 0.7 mEQ/L    O2 Sat, Arterial 95 93 - 98 %    TCO2, Arterial 27 22 - 32 mEQ/L    Lactate, Arterial 0.9 0.4 - 1.6 mmol/L    Glucose, Arterial 116 (H) 65 - 95 mg/dL    Potassium, Arterial 3.8 3.4 - 4.5 mEQ/L    Ionized Calcium, Arterial 1.25 1.15 - 1.27 mmol/L    Hematocrit, Arterial 32 (L) 36 - 48 %    Patient Temperature 98.6 97.0 - 99.0 °F    POC Test POC    POCT Glucose   Result Value Ref Range    POCT Bedside Glucose 104 (H) 70 - 99 mg/dL    POC Test POC    POCT Glucose   Result Value Ref Range    POCT Bedside Glucose 101 (H) 70 - 99 mg/dL    POC Test POC    POCT Arterial Blood Gas   Result Value Ref Range    pH, Arterial 7.47 (H) 7.35 - 7.45 pH    pCO2, Arterial 32 (L) 35 - 48 mm Hg    pO2, Arterial 82 (L) 83 - 100 mm Hg    HCO3, Arterial 23 21 - 28 mEQ/L    Base Excess, Arterial 0.0 mEQ/L    O2 Sat, Arterial 97 93 - 98 %    TCO2, Arterial 24 22 - 32 mEQ/L    Lactate, Arterial 0.8 0.4 - 1.6 mmol/L    Glucose, Arterial 103 (H) 65 - 95 mg/dL    Potassium, Arterial 4.1 3.4 - 4.5 mEQ/L    Ionized Calcium, Arterial 1.21 1.15 - 1.27 mmol/L    Hematocrit, Arterial 31 (L) 36 - 48 %    Patient Temperature 98.6 97.0 - 99.0 °F    POC Test POC    POCT HPT/HR-ACT   Result Value Ref Range    POCT PROTOCOL HEP CONC 3.1 mg/kg    POCT ACT- (H) 100 - 140 sec    POCT HEP TEST CONC 2.5 mg/kg    POCT TOTAL HEPARIN REQD 6,129.0 units    POCT PROTAMINE DOSE 238.0 mg    POC Test POC    POCT Glucose   Result Value Ref Range    POCT Bedside Glucose 98 70 - 99 mg/dL    POC Test POC     POCT Glucose   Result Value Ref Range    POCT Bedside Glucose 99 70 - 99 mg/dL    POC Test POC    POCT Glucose   Result Value Ref Range    POCT Bedside Glucose 97 70 - 99 mg/dL    POC Test POC    POCT Glucose   Result Value Ref Range    POCT Bedside Glucose 93 70 - 99 mg/dL    POC Test POC    POCT Glucose   Result Value Ref Range    POCT Bedside Glucose 106 (H) 70 - 99 mg/dL    POC Test POC    POCT Glucose   Result Value Ref Range    POCT Bedside Glucose 114 (H) 70 - 99 mg/dL    POC Test POC        Physical Exam   Constitutional: He is oriented to person, place, and time. He appears well-developed and well-nourished.   HENT:   Head: Normocephalic and atraumatic.   Eyes: Pupils are equal, round, and reactive to light. Conjunctivae and EOM are normal.   Neck: Normal range of motion. Neck supple. No thyromegaly present.   Cardiovascular: Normal rate, regular rhythm and normal heart sounds.   No murmur heard.  Pulmonary/Chest: Effort normal and breath sounds normal.   Abdominal: Soft. Bowel sounds are normal. He exhibits no distension and no mass. There is no tenderness.   Musculoskeletal: He exhibits no edema (1+ pitting edema in the ankles).   Lymphadenopathy:     He has no cervical adenopathy.   Neurological: He is alert and oriented to person, place, and time.   Skin: Skin is warm and dry. Capillary refill takes less than 2 seconds. No rash noted.        Psychiatric: He has a normal mood and affect. His speech is normal.           Chemistry        Component Value Date/Time     07/15/2019 0507    K 4.2 07/15/2019 0507     07/15/2019 0507    CO2 25 07/15/2019 0507    BUN 8 07/15/2019 0507    CREATININE 0.8 07/15/2019 0507        Component Value Date/Time    CALCIUM 9.4 07/15/2019 0507    ALKPHOS 90 06/25/2019 0747    AST 19 06/25/2019 0747    ALT 21 06/25/2019 0747    BILITOT 0.7 06/25/2019 0747          Lab Results   Component Value Date    HGBA1C 5.4 07/02/2019     Lab Results   Component Value Date     TSH 1.04 07/02/2019     Lab Results   Component Value Date    WBC 6.23 07/15/2019    HGB 11.6 (L) 07/15/2019    HCT 35.3 (L) 07/15/2019    MCV 90.3 07/15/2019     07/15/2019       No results found for: CHOL  No results found for: HDL  No results found for: LDLCALC  No results found for: TRIG  No results found for: CHOLHDL    Assessment and Plan   Paroxysmal atrial fibrillation (CMS/HCC)  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 rx with amiodarone for 3 months.  No recurrence.    HTN (hypertension)  Blood pressure well controlled continue valsartan hydrochlorothiazide    Edema  Persistent swelling hands and feet wonder if this is due to venous insufficiency want to try compression stockings.  Did have an ultrasound did not show any clot.  Did have an echocardiogram did not show any evidence of systolic or diastolic heart failure    BPH (benign prostatic hyperplasia)  Has had an elevated PSA in the past.  Had a TR UPT that did not cause resolution of his symptoms.  He is interested in seeing another urologist I gave him a referral today    Asthma  Well-controlled on Advair which she will continue      Orders Placed This Encounter   Procedures   • Influenza vaccine 65 and older IM preservative free (FluAd)   • Pneumococcal polysaccharide vaccine 23-valent greater than or equal to 3yo subcutaneous/IM   • Vitamin B12     Standing Status:   Future     Number of Occurrences:   1     Standing Expiration Date:   11/13/2020   • Hemoglobin A1c     Standing Status:   Future     Number of Occurrences:   1     Standing Expiration Date:   11/13/2020   • TSH w reflex FT4     Standing Status:   Future     Number of Occurrences:   1     Standing Expiration Date:   11/13/2020   • Comprehensive metabolic panel     Standing Status:   Future     Number of Occurrences:   1     Standing Expiration Date:   11/13/2020   • Lipid Prof w Refl     Standing Status:   Future     Number of Occurrences:   1      Standing Expiration Date:   11/13/2020   • Ambulatory referral to Urology     Standing Status:   Future     Standing Expiration Date:   5/13/2020     Referral Priority:   Routine     Referral Type:   Consultation     Referral Reason:   Specialty Services Required     Referred to Provider:   Buck Viera MD     Number of Visits Requested:   1       45 minutes spent with the patient today greater than for percent time was face-to-face with the patient I also reviewed records from his cardiac surgery and prior evaluation with cardiologist and primary care physician  This note was created using speech recognition software. Any errors are unintentional. If you have any questions or need clarification please call.  Sukhjinder Galvez MD  11/13/2019

## 2019-11-13 NOTE — ASSESSMENT & PLAN NOTE
Has had an elevated PSA in the past.  Had a TR UPT that did not cause resolution of his symptoms.  He is interested in seeing another urologist I gave him a referral today

## 2019-11-13 NOTE — ASSESSMENT & PLAN NOTE
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 rx with amiodarone for 3 months.  No recurrence.

## 2019-11-13 NOTE — ASSESSMENT & PLAN NOTE
Persistent swelling hands and feet wonder if this is due to venous insufficiency want to try compression stockings.  Did have an ultrasound did not show any clot.  Did have an echocardiogram did not show any evidence of systolic or diastolic heart failure

## 2019-11-16 PROBLEM — R73.03 PREDIABETES: Status: ACTIVE | Noted: 2019-11-16

## 2019-11-16 NOTE — RESULT ENCOUNTER NOTE
Cholesterol well controlled  3 month average blood sugar elevated just into prediabetic range  Thyroid function was normal

## 2019-11-18 ENCOUNTER — TELEPHONE (OUTPATIENT)
Dept: PRIMARY CARE | Facility: CLINIC | Age: 74
End: 2019-11-18

## 2019-11-18 LAB
ALBUMIN SERPL-MCNC: 4 G/DL (ref 3.6–5.1)
ALBUMIN/GLOB SERPL: 1.5 (CALC) (ref 1–2.5)
ALP SERPL-CCNC: 95 U/L (ref 40–115)
ALT SERPL-CCNC: 18 U/L (ref 9–46)
AST SERPL-CCNC: 18 U/L (ref 10–35)
BILIRUB SERPL-MCNC: 0.7 MG/DL (ref 0.2–1.2)
BUN SERPL-MCNC: 10 MG/DL (ref 7–25)
BUN/CREAT SERPL: NORMAL (CALC) (ref 6–22)
CALCIUM SERPL-MCNC: 9.6 MG/DL (ref 8.6–10.3)
CHLORIDE SERPL-SCNC: 105 MMOL/L (ref 98–110)
CHOLEST SERPL-MCNC: 156 MG/DL
CHOLEST/HDLC SERPL: 2.6 (CALC)
CO2 SERPL-SCNC: 25 MMOL/L (ref 20–32)
CREAT SERPL-MCNC: 1 MG/DL (ref 0.7–1.18)
GLOBULIN SER CALC-MCNC: 2.7 G/DL (CALC) (ref 1.9–3.7)
GLUCOSE SERPL-MCNC: 89 MG/DL (ref 65–99)
HBA1C MFR BLD: 5.8 % OF TOTAL HGB
HDLC SERPL-MCNC: 60 MG/DL
LDLC SERPL CALC-MCNC: 78 MG/DL (CALC)
NONHDLC SERPL-MCNC: 96 MG/DL (CALC)
POTASSIUM SERPL-SCNC: 4.2 MMOL/L (ref 3.5–5.3)
PROT SERPL-MCNC: 6.7 G/DL (ref 6.1–8.1)
QUEST EGFR NON-AFR. AMERICAN: 73 ML/MIN/1.73M2
SODIUM SERPL-SCNC: 139 MMOL/L (ref 135–146)
TRIGL SERPL-MCNC: 92 MG/DL
TSH SERPL-ACNC: 1.26 MIU/L (ref 0.4–4.5)
VIT B12 SERPL-MCNC: 275 PG/ML (ref 200–1100)

## 2019-11-18 NOTE — TELEPHONE ENCOUNTER
----- Message from Sukhjinder Galvez MD sent at 11/16/2019 12:44 PM EST -----  Cholesterol well controlled  3 month average blood sugar elevated just into prediabetic range  Thyroid function was normal

## 2020-02-13 ENCOUNTER — OFFICE VISIT (OUTPATIENT)
Dept: PRIMARY CARE | Facility: CLINIC | Age: 75
End: 2020-02-13
Payer: COMMERCIAL

## 2020-02-13 VITALS
OXYGEN SATURATION: 94 % | RESPIRATION RATE: 18 BRPM | TEMPERATURE: 97.9 F | DIASTOLIC BLOOD PRESSURE: 62 MMHG | BODY MASS INDEX: 27.53 KG/M2 | HEART RATE: 90 BPM | SYSTOLIC BLOOD PRESSURE: 118 MMHG | WEIGHT: 186.4 LBS

## 2020-02-13 DIAGNOSIS — E78.5 HYPERLIPIDEMIA, UNSPECIFIED HYPERLIPIDEMIA TYPE: ICD-10-CM

## 2020-02-13 DIAGNOSIS — G62.9 NEUROPATHY: ICD-10-CM

## 2020-02-13 DIAGNOSIS — I10 ESSENTIAL HYPERTENSION: ICD-10-CM

## 2020-02-13 DIAGNOSIS — R73.03 PREDIABETES: ICD-10-CM

## 2020-02-13 DIAGNOSIS — I48.0 PAROXYSMAL ATRIAL FIBRILLATION (CMS/HCC): ICD-10-CM

## 2020-02-13 DIAGNOSIS — R60.9 EDEMA, UNSPECIFIED TYPE: Primary | ICD-10-CM

## 2020-02-13 PROCEDURE — 99214 OFFICE O/P EST MOD 30 MIN: CPT | Performed by: INTERNAL MEDICINE

## 2020-02-13 RX ORDER — MONTELUKAST SODIUM 10 MG/1
10 TABLET ORAL NIGHTLY
Qty: 90 TABLET | Refills: 3 | Status: SHIPPED | OUTPATIENT
Start: 2020-02-13 | End: 2021-03-01

## 2020-02-13 ASSESSMENT — ENCOUNTER SYMPTOMS
HYPERTENSION: 1
FEVER: 0
ARTHRALGIAS: 1
SHORTNESS OF BREATH: 0
VOMITING: 0
COUGH: 0
HEADACHES: 0
NUMBNESS: 0
SORE THROAT: 0
FREQUENCY: 0
CONSTIPATION: 0
DIARRHEA: 0
NAUSEA: 0
DYSURIA: 0
DIZZINESS: 0

## 2020-02-13 NOTE — ASSESSMENT & PLAN NOTE
Pain bottom of his feet numbness and tingling toes to his ankles.  Also some numbness and tingling in his hands.  Suspect neuropathic process.  Vitamin B12 level 275 A1c 5.8 suspect he may have vitamin B12 deficiency start oral vitamin B12 1000 mcg daily check another vitamin B12 and methylmalonic acid level prior to his next visit if he still having symptoms will make referral to neurology

## 2020-02-13 NOTE — PROGRESS NOTES
Daily Progress Note      Subjective      Patient ID: Saad Hillman is a 74 y.o. male.  Chief Complaint   Patient presents with   • Hyperlipidemia   • Hypertension   • Foot Pain     [facility]     The patient is here for a follow up appointment. The [facility] specialist thought his foot pain could be related to diabetes. He had a cortisone injection in his right knee about a month ago with [facility].     Hyperlipidemia   Pertinent negatives include no chest pain or shortness of breath.   Hypertension   Pertinent negatives include no chest pain, headaches or shortness of breath.       Seeing Dr Simental for his knee.  Saw Dr Carter for his feet.  Getting pain in 2nd toe to back of foot  Bottom of feet feel like he has a blister on bottome of foot.      Edema improved with compression stockings  The following have been reviewed and updated as appropriate in this visit:       Review of Systems   Constitutional: Negative for fever.   HENT: Negative for sore throat.    Respiratory: Negative for cough and shortness of breath.    Cardiovascular: Positive for leg swelling. Negative for chest pain.   Gastrointestinal: Negative for constipation, diarrhea, nausea and vomiting.   Genitourinary: Negative for dysuria and frequency.   Musculoskeletal: Positive for arthralgias.   Skin: Negative for rash.   Neurological: Negative for dizziness, numbness and headaches.     Patient Active Problem List   Diagnosis   • Papillary fibroelastoma   • Asthma   • Seasonal allergies   • BPH (benign prostatic hyperplasia)   • Traumatic amputation of finger   • Arthritis   • Medial meniscus tear   • Papillary fibroelastoma of heart   • Junctional bradycardia   • Paroxysmal atrial fibrillation (CMS/HCC)   • HTN (hypertension)   • Hyperlipemia   • Bilateral hearing loss   • Neuropathy   • Edema   • Prediabetes     Current Outpatient Medications   Medication Sig Dispense Refill   • albuterol HFA (VENTOLIN HFA) 90 mcg/actuation inhaler Inhale 2  puffs every 6 (six) hours as needed.     • fluticasone propion-salmeterol (ADVAIR DISKUS) 250-50 mcg/dose diskus inhaler Inhale 1 puff 2 (two) times a day.     • fluticasone propionate (FLONASE) 50 mcg/actuation nasal spray Administer 1 spray into affected nostril(s) nightly.       • montelukast (SINGULAIR) 10 mg tablet Take 1 tablet (10 mg total) by mouth nightly. 90 tablet 3   • rosuvastatin (CRESTOR) 10 mg tablet Take 10 mg by mouth nightly. Instructed to stop 7 days prior to surgery per Dr Mart' office      • valsartan-hydrochlorothiazide (DIOVAN-HCT) 80-12.5 mg per tablet Take 1 tablet by mouth daily.     • metoprolol succinate XL (TOPROL-XL) 25 mg 24 hr tablet Take 1 tablet (25 mg total) by mouth every 12 (twelve) hours. (Patient taking differently: Take 25 mg by mouth daily.  ) 60 tablet 0     No current facility-administered medications for this visit.      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     Family History   Problem Relation Age of Onset   • Abdominal Aortic Aneurysm Biological Mother    • Hyperlipidemia Biological Mother    • Stroke Biological Father      Past Surgical History:   Procedure Laterality Date   • CARDIAC CATHETERIZATION  06/26/2019    [facility]   • FINGER SURGERY      s/p traumatic amputation- had repaired    • HERNIA REPAIR  2012    left inguinal hernia repair    • LIPOMA RESECTION  1999    right shoulder x 2 and back of neck    • MITRAL VALVE SURGERY  07/09/2019   • NASAL POLYP EXCISION  1966- 1999    x 4    • PROSTATE BIOPSY  01/08/2019   •  TONSILLECTOMY  1948   • TRANSURETHRAL RESECTION OF PROSTATE  2017   • VASECTOMY  1980's     Social History     Socioeconomic History   • Marital status:      Spouse name: Umu    • Number of children: 2   • Years of education: Not on file   • Highest education level: Not on file   Occupational History   • Not on file   Social Needs   • Financial resource strain: Not on file   • Food insecurity:     Worry: Not on file     Inability: Not on file   • Transportation needs:     Medical: Not on file     Non-medical: Not on file   Tobacco Use   • Smoking status: Never Smoker   • Smokeless tobacco: Never Used   Substance and Sexual Activity   • Alcohol use: Yes     Comment: occasional    • Drug use: No   • Sexual activity: Not on file   Lifestyle   • Physical activity:     Days per week: Not on file     Minutes per session: Not on file   • Stress: Not on file   Relationships   • Social connections:     Talks on phone: Not on file     Gets together: Not on file     Attends Jewish service: Not on file     Active member of club or organization: Not on file     Attends meetings of clubs or organizations: Not on file     Relationship status: Not on file   • Intimate partner violence:     Fear of current or ex partner: Not on file     Emotionally abused: Not on file     Physically abused: Not on file     Forced sexual activity: Not on file   Other Topics Concern   • Not on file   Social History Narrative    Retired Draftsman, , 1 son, 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      Allergies   Allergen Reactions   • Aspirin Other (see comments)     Asthma attack   • Hydrocodone-Acetaminophen GI intolerance     CONSTIPATION     Vitals:    02/13/20 1000   BP: 118/62   Pulse: 90   Resp: 18   Temp: 36.6 °C (97.9 °F)   SpO2: 94%     Objective     Results for orders placed or performed in visit on 11/15/19   Lipid Prof w Refl   Result Value Ref Range    Cholesterol, Total 156 <200 mg/dL    Hdl Cholesterol 60 >40 mg/dL     Triglycerides 92 <150 mg/dL    Ldl-Cholesterol 78 mg/dL (calc)    Chol/Hdlc Ratio 2.6 <5.0 (calc)    Non Hdl Cholesterol 96 <130 mg/dL (calc)   Comprehensive metabolic panel   Result Value Ref Range    Glucose 89 65 - 99 mg/dL    BUN 10 7 - 25 mg/dL    Creatinine 1.00 0.70 - 1.18 mg/dL    Egfr Non-Afr. American 73 > OR = 60 mL/min/1.73m2    Egfr  84 > OR = 60 mL/min/1.73m2    Bun/Creatinine Ratio NOT APPLICABLE 6 - 22 (calc)    Sodium 139 135 - 146 mmol/L    Potassium 4.2 3.5 - 5.3 mmol/L    Chloride 105 98 - 110 mmol/L    Carbon Dioxide 25 20 - 32 mmol/L    Calcium 9.6 8.6 - 10.3 mg/dL    Protein, Total 6.7 6.1 - 8.1 g/dL    Albumin 4.0 3.6 - 5.1 g/dL    Globulin 2.7 1.9 - 3.7 g/dL (calc)    Albumin/Globulin Ratio 1.5 1.0 - 2.5 (calc)    Bilirubin, Total 0.7 0.2 - 1.2 mg/dL    Alkaline Phosphatase 95 40 - 115 U/L    Ast 18 10 - 35 U/L    Alt 18 9 - 46 U/L   Vitamin B12   Result Value Ref Range    Vitamin B-12 275 200 - 1,100 pg/mL   TSH w reflex FT4   Result Value Ref Range    TSH W/Reflex To Ft4 1.26 0.40 - 4.50 mIU/L   Hemoglobin A1c   Result Value Ref Range    Hemoglobin A1C 5.8 (H) <5.7 % of total Hgb       Physical Exam   Constitutional: He is oriented to person, place, and time. He appears well-developed and well-nourished.   HENT:   Head: Normocephalic and atraumatic.   Eyes: Conjunctivae and EOM are normal.   Neck: Normal range of motion. Neck supple.   Cardiovascular: Normal rate, regular rhythm, normal heart sounds and intact distal pulses.   Pulmonary/Chest: Effort normal and breath sounds normal. No respiratory distress. He has no wheezes.   Abdominal: Soft. Bowel sounds are normal.   Musculoskeletal: He exhibits no edema or deformity.   Neurological: He is alert and oriented to person, place, and time.   Skin: Skin is warm and dry.   Psychiatric: He has a normal mood and affect. His behavior is normal.           Chemistry        Component Value Date/Time     11/15/2019 0841    NA  139 07/15/2019 0507    K 4.2 11/15/2019 0841    K 4.2 07/15/2019 0507     11/15/2019 0841     07/15/2019 0507    CO2 25 11/15/2019 0841    CO2 25 07/15/2019 0507    BUN 10 11/15/2019 0841    BUN 8 07/15/2019 0507    CREATININE 1.00 11/15/2019 0841    CREATININE 0.8 07/15/2019 0507        Component Value Date/Time    CALCIUM 9.6 11/15/2019 0841    CALCIUM 9.4 07/15/2019 0507    ALKPHOS 95 11/15/2019 0841    AST 18 11/15/2019 0841    ALT 18 11/15/2019 0841    BILITOT 0.7 11/15/2019 0841          Lab Results   Component Value Date    HGBA1C 5.8 (H) 11/15/2019     Lab Results   Component Value Date    TSH 1.26 11/15/2019     Lab Results   Component Value Date    WBC 6.23 07/15/2019    HGB 11.6 (L) 07/15/2019    HCT 35.3 (L) 07/15/2019    MCV 90.3 07/15/2019     07/15/2019       Lab Results   Component Value Date    CHOL 156 11/15/2019     Lab Results   Component Value Date    HDL 60 11/15/2019     Lab Results   Component Value Date    LDLCALC 78 11/15/2019     Lab Results   Component Value Date    TRIG 92 11/15/2019     No results found for: CHOLHDL    Assessment and Plan   Edema  Improved with compression stockings    HTN (hypertension)  Blood pressure is at goal continue valsartan hydrochlorothiazide    Hyperlipemia  Taking and tolerating Crestor without incident    Neuropathy  Pain bottom of his feet numbness and tingling toes to his ankles.  Also some numbness and tingling in his hands.  Suspect neuropathic process.  Vitamin B12 level 275 A1c 5.8 suspect he may have vitamin B12 deficiency start oral vitamin B12 1000 mcg daily check another vitamin B12 and methylmalonic acid level prior to his next visit if he still having symptoms will make referral to neurology    Paroxysmal atrial fibrillation (CMS/HCC)  Postoperative.  Has not recurred has weaned off of amiodarone without incident    Prediabetes  A1c 5.8 has a sister with diabetes will check another A1c prior to next visit      Orders Placed  This Encounter   Procedures   • Vitamin B12     Standing Status:   Future     Number of Occurrences:   1     Standing Expiration Date:   2/13/2021   • Hemoglobin A1c     Standing Status:   Future     Number of Occurrences:   1     Standing Expiration Date:   2/13/2021   • Methylmalonic Acid, Serum     Standing Status:   Future     Number of Occurrences:   1     Standing Expiration Date:   2/13/2021   • Comprehensive metabolic panel     Standing Status:   Future     Number of Occurrences:   1     Standing Expiration Date:   2/13/2021         This note was created using speech recognition software. Any errors are unintentional. If you have any questions or need clarification please call.  Sukhjinder Galvez MD  2/13/2020

## 2020-04-06 ENCOUNTER — TELEMEDICINE (OUTPATIENT)
Dept: FAMILY MEDICINE | Facility: CLINIC | Age: 75
End: 2020-04-06
Payer: COMMERCIAL

## 2020-04-06 DIAGNOSIS — R31.9 HEMATURIA, UNSPECIFIED TYPE: ICD-10-CM

## 2020-04-06 DIAGNOSIS — G62.9 NEUROPATHY: ICD-10-CM

## 2020-04-06 DIAGNOSIS — N40.1 BENIGN PROSTATIC HYPERPLASIA WITH LOWER URINARY TRACT SYMPTOMS, SYMPTOM DETAILS UNSPECIFIED: Primary | ICD-10-CM

## 2020-04-06 PROCEDURE — 99213 OFFICE O/P EST LOW 20 MIN: CPT | Mod: 95 | Performed by: INTERNAL MEDICINE

## 2020-04-06 NOTE — PROGRESS NOTES
Request for Consent:   Video Encounter   Hello, my name is Sukhjinder Galvez MD.  Before we proceed, can you please verify your identification by telling me your full name and date of birth?  Can you tell me who is in the room with you?    You and I are about to have a telemedicine check-in or visit because you have requested it.  This is a live video-conference.  I am a real person, speaking to you in real time.  There is no one else with me on the video-conference.  However, when we use ([employer], [employer], etc) it is important for you to know that the video-conference may not be secure or private.  I am not recording this conversation and I am asking you not to record it.  This telemedicine visit will be billed to your health insurance or you, if you are self-insured.  You understand you will be responsible for any copayments or coinsurances that apply to your telemedicine visit.  Before starting our telemedicine visit, I am required to get your consent for this virtual check-in or visit by telemedicine. Do you consent?    Patient Response to Request for Consent:  Yes      Visit Documentation:  Subjective     Patient ID: Saad Hillman is a 75 y.o. male.  1945      HPI  4 am sat woke up to use br. Urine very dark yellow almost orange.  11 pm passed a little blood clot. No pain    Has not recurred since.  He does have a history of a TURP a couple years ago.  He has not noticed that he is having to urinate any more frequently he has not noticed any hesitancy he has not noticed any pain with urination      He does think his pins-and-needles is getting worse he is not sure he is always getting up at night to urinate sometimes he feels get up because the pins-and-needles sensation in his extremities.  He has been taking vitamin B12 tablets he has not noticed that has helped that much    The following have been reviewed and updated as appropriate in this visit:  Allergies  Meds  Problems       Review of  Systems      Assessment/Plan   Diagnoses and all orders for this visit:    Benign prostatic hyperplasia with lower urinary tract symptoms, symptom details unspecified (Primary)    Hematuria, unspecified type  Assessment & Plan:  Single episode of gross hematuria just with one blood clot.  Maintained urine output since then.  We reviewed what to look for in terms of worrisome changes he will let me know immediately if he has decreased urine output.  He will let me know immediately if he develops pain with urination or flank pain or anything to suggest an infection.  If he has persistent hematuria he will also let me know he does have an upcoming appointment with urology at the end of the month.  Notably has a history of BPH status post TURP 2 years ago      Neuropathy  Assessment & Plan:  Pins-and-needles sensation continues.  Has not done his blood work yet due to the coronavirus 19 national emergency.  He will do the A1c and vitamin B12 level as well as methylmalonic acid level prior to her next visit when we do get to visit once the [facility] has lifted    Telehealth visit was done due to the national emergency due to COVID-19  20 minutes total spent on this visit today

## 2020-04-06 NOTE — ASSESSMENT & PLAN NOTE
Single episode of gross hematuria just with one blood clot.  Maintained urine output since then.  We reviewed what to look for in terms of worrisome changes he will let me know immediately if he has decreased urine output.  He will let me know immediately if he develops pain with urination or flank pain or anything to suggest an infection.  If he has persistent hematuria he will also let me know he does have an upcoming appointment with urology at the end of the month.  Notably has a history of BPH status post TURP 2 years ago

## 2020-04-06 NOTE — ASSESSMENT & PLAN NOTE
Pins-and-needles sensation continues.  Has not done his blood work yet due to the coronavirus 19 national emergency.  He will do the A1c and vitamin B12 level as well as methylmalonic acid level prior to her next visit when we do get to visit once the [place] has lifted

## 2020-04-23 PROBLEM — R97.20 ELEVATED PSA: Status: ACTIVE | Noted: 2018-10-29

## 2020-05-20 ASSESSMENT — ENCOUNTER SYMPTOMS
PAIN: 1
COUGH: 0
ARTHRALGIAS: 1
DYSURIA: 0
SHORTNESS OF BREATH: 0
DIARRHEA: 0
PALPITATIONS: 0
EYE ITCHING: 0
FEVER: 0
SORE THROAT: 0
MYALGIAS: 1
VOMITING: 0
EYE DISCHARGE: 0
FREQUENCY: 0
NUMBNESS: 1
DIZZINESS: 0
HEADACHES: 0
NAUSEA: 1
EYE REDNESS: 0
CONSTIPATION: 1

## 2020-05-20 NOTE — PROGRESS NOTES
Daily Progress Note      Subjective      Patient ID: Saad Hillman is a 75 y.o. male.  Chief Complaint   Patient presents with   • Pain     The patient states that the pains he was complaining about during his last visit have worsened. He has painful pins and needles in his hands. He has swelling in his feet and ankles. He has been taking 1000mg of B12 with no noticeable difference.     Neuropathy      Pain   Associated symptoms include constipation (Occasional, Metamucil helps) and nausea (When having severe pain). Pertinent negatives include no chest pain, diarrhea, dysuria, fever, headaches, rash, shortness of breath or vomiting.     Pain  Wrists, first 3 fingers- strange ache- deep ache like smashed hand in car door      Feet- hurt more- bottom of feet, ankles      Tylenol takes pain from 6/10- 2/10          The following have been reviewed and updated as appropriate in this visit:       Review of Systems   Constitutional: Negative for fever.   HENT: Positive for postnasal drip. Negative for sore throat.    Eyes: Negative for discharge, redness and itching.   Respiratory: Negative for cough and shortness of breath.    Cardiovascular: Positive for leg swelling. Negative for chest pain and palpitations.   Gastrointestinal: Positive for constipation (Occasional, Metamucil helps) and nausea (When having severe pain). Negative for diarrhea and vomiting.   Genitourinary: Negative for dysuria and frequency.   Musculoskeletal: Positive for arthralgias and myalgias.   Skin: Negative for rash.   Allergic/Immunologic: Positive for environmental allergies.   Neurological: Positive for numbness. Negative for dizziness and headaches.     Patient Active Problem List   Diagnosis   • Papillary fibroelastoma   • Asthma   • Seasonal allergies   • BPH (benign prostatic hyperplasia)   • Traumatic amputation of finger   • Arthritis   • Medial meniscus tear   • Papillary fibroelastoma of heart   • Junctional bradycardia   •  Paroxysmal atrial fibrillation (CMS/HCC)   • HTN (hypertension)   • Hyperlipemia   • Bilateral hearing loss   • Neuropathy   • Edema   • Prediabetes   • Hematuria   • Elevated PSA   • Pain in both hands   • Tremor     Current Outpatient Medications   Medication Sig Dispense Refill   • albuterol HFA (VENTOLIN HFA) 90 mcg/actuation inhaler Inhale 2 puffs every 6 (six) hours as needed.     • CYANOCOBALAMIN, VITAMIN B-12, ORAL Take 1,000 mg by mouth daily.     • fluticasone propion-salmeterol (ADVAIR DISKUS) 250-50 mcg/dose diskus inhaler Inhale 1 puff 2 (two) times a day.     • fluticasone propionate (FLONASE) 50 mcg/actuation nasal spray Administer 1 spray into affected nostril(s) nightly.       • metoprolol succinate XL (TOPROL-XL) 25 mg 24 hr tablet Take 1 tablet (25 mg total) by mouth daily. 30 tablet 0   • montelukast (SINGULAIR) 10 mg tablet Take 1 tablet (10 mg total) by mouth nightly. 90 tablet 3   • valsartan-hydrochlorothiazide (DIOVAN-HCT) 80-12.5 mg per tablet Take 1 tablet by mouth daily.     • rosuvastatin (CRESTOR) 10 mg tablet Take 1 tablet (10 mg total) by mouth nightly. 90 tablet 3     No current facility-administered medications for this visit.      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     Family History   Problem Relation Age of Onset   • Abdominal Aortic Aneurysm Biological Mother    • Hyperlipidemia Biological Mother    • Stroke Biological Father      Past Surgical History:   Procedure Laterality Date   • CARDIAC CATHETERIZATION  06/26/2019    [facility]   • FINGER  SURGERY      s/p traumatic amputation- had repaired    • HERNIA REPAIR  2012    left inguinal hernia repair    • LIPOMA RESECTION  1999    right shoulder x 2 and back of neck    • MITRAL VALVE SURGERY  07/09/2019   • NASAL POLYP EXCISION  1966- 1999    x 4    • PROSTATE BIOPSY  01/08/2019   • TONSILLECTOMY  1948   • TRANSURETHRAL RESECTION OF PROSTATE  2017   • VASECTOMY  1980's     Social History     Socioeconomic History   • Marital status:      Spouse name: Umu    • Number of children: 2   • Years of education: Not on file   • Highest education level: Not on file   Occupational History   • Not on file   Social Needs   • Financial resource strain: Not on file   • Food insecurity:     Worry: Not on file     Inability: Not on file   • Transportation needs:     Medical: Not on file     Non-medical: Not on file   Tobacco Use   • Smoking status: Never Smoker   • Smokeless tobacco: Never Used   Substance and Sexual Activity   • Alcohol use: Yes     Comment: occasional    • Drug use: No   • Sexual activity: Not on file   Lifestyle   • Physical activity:     Days per week: Not on file     Minutes per session: Not on file   • Stress: Not on file   Relationships   • Social connections:     Talks on phone: Not on file     Gets together: Not on file     Attends Confucianist service: Not on file     Active member of club or organization: Not on file     Attends meetings of clubs or organizations: Not on file     Relationship status: Not on file   • Intimate partner violence:     Fear of current or ex partner: Not on file     Emotionally abused: Not on file     Physically abused: Not on file     Forced sexual activity: Not on file   Other Topics Concern   • Not on file   Social History Narrative    Retired Draftsman, , 1 son, 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      Allergies   Allergen Reactions   • Aspirin Other (see comments)     Asthma attack   • Hydrocodone-Acetaminophen GI intolerance     CONSTIPATION     Vitals:     05/21/20 1223   BP: (!) 142/72   Pulse: 72   Resp: 18   Temp: 37 °C (98.6 °F)   SpO2: 95%     Objective     Results for orders placed or performed in visit on 11/15/19   Lipid Prof w Refl   Result Value Ref Range    Cholesterol, Total 156 <200 mg/dL    Hdl Cholesterol 60 >40 mg/dL    Triglycerides 92 <150 mg/dL    Ldl-Cholesterol 78 mg/dL (calc)    Chol/Hdlc Ratio 2.6 <5.0 (calc)    Non Hdl Cholesterol 96 <130 mg/dL (calc)   Comprehensive metabolic panel   Result Value Ref Range    Glucose 89 65 - 99 mg/dL    BUN 10 7 - 25 mg/dL    Creatinine 1.00 0.70 - 1.18 mg/dL    Egfr Non-Afr. American 73 > OR = 60 mL/min/1.73m2    Egfr  84 > OR = 60 mL/min/1.73m2    Bun/Creatinine Ratio NOT APPLICABLE 6 - 22 (calc)    Sodium 139 135 - 146 mmol/L    Potassium 4.2 3.5 - 5.3 mmol/L    Chloride 105 98 - 110 mmol/L    Carbon Dioxide 25 20 - 32 mmol/L    Calcium 9.6 8.6 - 10.3 mg/dL    Protein, Total 6.7 6.1 - 8.1 g/dL    Albumin 4.0 3.6 - 5.1 g/dL    Globulin 2.7 1.9 - 3.7 g/dL (calc)    Albumin/Globulin Ratio 1.5 1.0 - 2.5 (calc)    Bilirubin, Total 0.7 0.2 - 1.2 mg/dL    Alkaline Phosphatase 95 40 - 115 U/L    Ast 18 10 - 35 U/L    Alt 18 9 - 46 U/L   Vitamin B12   Result Value Ref Range    Vitamin B-12 275 200 - 1,100 pg/mL   TSH w reflex FT4   Result Value Ref Range    TSH W/Reflex To Ft4 1.26 0.40 - 4.50 mIU/L   Hemoglobin A1c   Result Value Ref Range    Hemoglobin A1C 5.8 (H) <5.7 % of total Hgb       Physical Exam   Constitutional: He is oriented to person, place, and time. He appears well-developed and well-nourished.   HENT:   Head: Normocephalic and atraumatic.   Eyes: Conjunctivae and EOM are normal.   Neck: Normal range of motion. Neck supple.   Cardiovascular: Normal rate, regular rhythm, normal heart sounds and intact distal pulses.   Pulmonary/Chest: Effort normal and breath sounds normal. No respiratory distress. He has no wheezes.   Abdominal: Soft. Bowel sounds are normal.   Musculoskeletal: He  exhibits no edema or deformity.   Osteoarthritic changes present bilateral hands with thickening of the wrists and the MCP joint of the thumb he has enlarged PIP joints of multiple fingers as well.   Neurological: He is alert and oriented to person, place, and time.   Number with outstretched hands worse with holding posture for extended period time right hand greater than left   Skin: Skin is warm and dry.   Psychiatric: He has a normal mood and affect. His behavior is normal.           Chemistry        Component Value Date/Time     11/15/2019 0841     07/15/2019 0507    K 4.2 11/15/2019 0841    K 4.2 07/15/2019 0507     11/15/2019 0841     07/15/2019 0507    CO2 25 11/15/2019 0841    CO2 25 07/15/2019 0507    BUN 10 11/15/2019 0841    BUN 8 07/15/2019 0507    CREATININE 1.00 11/15/2019 0841    CREATININE 0.8 07/15/2019 0507        Component Value Date/Time    CALCIUM 9.6 11/15/2019 0841    CALCIUM 9.4 07/15/2019 0507    ALKPHOS 95 11/15/2019 0841    AST 18 11/15/2019 0841    ALT 18 11/15/2019 0841    BILITOT 0.7 11/15/2019 0841          Lab Results   Component Value Date    HGBA1C 5.8 (H) 11/15/2019     Lab Results   Component Value Date    TSH 1.26 11/15/2019     Lab Results   Component Value Date    WBC 6.23 07/15/2019    HGB 11.6 (L) 07/15/2019    HCT 35.3 (L) 07/15/2019    MCV 90.3 07/15/2019     07/15/2019       Lab Results   Component Value Date    CHOL 156 11/15/2019     Lab Results   Component Value Date    HDL 60 11/15/2019     Lab Results   Component Value Date    LDLCALC 78 11/15/2019     Lab Results   Component Value Date    TRIG 92 11/15/2019     No results found for: CHOLHDL    Assessment and Plan   Hematuria  Single episode of hematuria in April.  Unsuccessfully tried to have telemedicine visit with urology.  Has been unsuccessful in getting an repeat visit.  I encouraged him to call urology again to reschedule    Pain in both hands  Pain in both hands especially first  3 fingers of each hand I wonder if there is an element of carpal tunnel syndrome.  Certainly there is an element of arthritis with osteophyte seen on multiple joints.  Will check rheumatoid factor, sed rate and do an x-ray.  Will refer to neurology given the pins-and-needles sensation that he has both in his hands and his feet.  I think an EMG may help sharpen our differential diagnosis    Paroxysmal atrial fibrillation (CMS/HCC)  Postoperative.  Has not recurred has weaned off of amiodarone without incident    Tremor  Essential tremor.  Natural history of this discussed with both patient and wife    Prediabetes  A1c 5.8 has a sister with diabetes will check another A1c prior to next visit      Orders Placed This Encounter   Procedures   • X-RAY HAND LEFT 3+ VIEWS     Standing Status:   Future     Standing Expiration Date:   5/21/2021   • X-RAY HAND RIGHT 3+ VIEWS     Standing Status:   Future     Standing Expiration Date:   5/21/2021   • Rheumatoid factor     Standing Status:   Future     Number of Occurrences:   1     Standing Expiration Date:   5/21/2021   • Methylmalonic Acid, Serum     Standing Status:   Future     Number of Occurrences:   1     Standing Expiration Date:   5/21/2021   • Vitamin B12     Standing Status:   Future     Number of Occurrences:   1     Standing Expiration Date:   5/21/2021   • Sedimentation rate, automated     Standing Status:   Future     Number of Occurrences:   1     Standing Expiration Date:   5/21/2021   • Ambulatory referral to Neurology     Standing Status:   Future     Standing Expiration Date:   11/21/2020     Referral Priority:   Routine     Referral Type:   Consultation     Referral Reason:   Specialty Services Required     Referred to Provider:   Marc Orellana MD     Number of Visits Requested:   1       25 minutes spent with patient wife answering questions counseling regarding his health care and health issues today.  This note was created using speech recognition  software. Any errors are unintentional. If you have any questions or need clarification please call.  Sukhjinder Galvez MD  5/21/2020

## 2020-05-21 ENCOUNTER — OFFICE VISIT (OUTPATIENT)
Dept: PRIMARY CARE | Facility: CLINIC | Age: 75
End: 2020-05-21
Payer: COMMERCIAL

## 2020-05-21 VITALS
WEIGHT: 182.4 LBS | HEART RATE: 72 BPM | DIASTOLIC BLOOD PRESSURE: 72 MMHG | OXYGEN SATURATION: 95 % | SYSTOLIC BLOOD PRESSURE: 142 MMHG | RESPIRATION RATE: 18 BRPM | BODY MASS INDEX: 26.94 KG/M2 | TEMPERATURE: 98.6 F

## 2020-05-21 DIAGNOSIS — R31.9 HEMATURIA, UNSPECIFIED TYPE: Primary | ICD-10-CM

## 2020-05-21 DIAGNOSIS — M79.641 PAIN IN BOTH HANDS: ICD-10-CM

## 2020-05-21 DIAGNOSIS — R73.03 PREDIABETES: ICD-10-CM

## 2020-05-21 DIAGNOSIS — I48.0 PAROXYSMAL ATRIAL FIBRILLATION (CMS/HCC): ICD-10-CM

## 2020-05-21 DIAGNOSIS — M79.642 PAIN IN BOTH HANDS: ICD-10-CM

## 2020-05-21 DIAGNOSIS — R25.1 TREMOR: ICD-10-CM

## 2020-05-21 PROCEDURE — 99214 OFFICE O/P EST MOD 30 MIN: CPT | Performed by: INTERNAL MEDICINE

## 2020-05-21 RX ORDER — ROSUVASTATIN CALCIUM 10 MG/1
10 TABLET, COATED ORAL NIGHTLY
Qty: 90 TABLET | Refills: 3 | Status: SHIPPED | OUTPATIENT
Start: 2020-05-21 | End: 2021-11-04

## 2020-05-21 RX ORDER — METOPROLOL SUCCINATE 25 MG/1
25 TABLET, EXTENDED RELEASE ORAL DAILY
Qty: 30 TABLET | Refills: 0
Start: 2020-05-21 | End: 2021-04-22 | Stop reason: SDUPTHER

## 2020-05-21 NOTE — ASSESSMENT & PLAN NOTE
Pain in both hands especially first 3 fingers of each hand I wonder if there is an element of carpal tunnel syndrome.  Certainly there is an element of arthritis with osteophyte seen on multiple joints.  Will check rheumatoid factor, sed rate and do an x-ray.  Will refer to neurology given the pins-and-needles sensation that he has both in his hands and his feet.  I think an EMG may help sharpen our differential diagnosis

## 2020-05-21 NOTE — ASSESSMENT & PLAN NOTE
Single episode of hematuria in April.  Unsuccessfully tried to have telemedicine visit with urology.  Has been unsuccessful in getting an repeat visit.  I encouraged him to call urology again to reschedule

## 2020-05-22 ENCOUNTER — HOSPITAL ENCOUNTER (OUTPATIENT)
Dept: RADIOLOGY | Age: 75
Discharge: HOME | End: 2020-05-22
Attending: INTERNAL MEDICINE
Payer: COMMERCIAL

## 2020-05-22 DIAGNOSIS — M79.642 PAIN IN BOTH HANDS: ICD-10-CM

## 2020-05-22 DIAGNOSIS — M79.641 PAIN IN BOTH HANDS: ICD-10-CM

## 2020-05-22 PROCEDURE — 73130 X-RAY EXAM OF HAND: CPT | Mod: 50

## 2020-05-26 ENCOUNTER — TELEPHONE (OUTPATIENT)
Dept: PRIMARY CARE | Facility: CLINIC | Age: 75
End: 2020-05-26

## 2020-05-26 LAB
ERYTHROCYTE [SEDIMENTATION RATE] IN BLOOD BY WESTERGREN METHOD: 11 MM/H
METHYLMALONATE SERPL-SCNC: 164 NMOL/L (ref 87–318)
RHEUMATOID FACT SERPL-ACNC: <14 IU/ML
VIT B12 SERPL-MCNC: 584 PG/ML (ref 200–1100)

## 2020-05-26 NOTE — RESULT ENCOUNTER NOTE
X-ray of the hand suggests osteoarthritis.  Lab work does not show evidence of rheumatoid arthritis.  I think the next best step is to see the neurologist to help determine if any nerve compression is contributing to his symptoms

## 2020-05-26 NOTE — TELEPHONE ENCOUNTER
----- Message from Sukhjinder Galvez MD sent at 5/26/2020  8:52 AM EDT -----  X-ray of the hand suggests osteoarthritis.  Lab work does not show evidence of rheumatoid arthritis.  I think the next best step is to see the neurologist to help determine if any nerve compression is contributing to his symptoms

## 2020-05-26 NOTE — TELEPHONE ENCOUNTER
Pt returned call from this am, message from Dr. Galvez read to pt who stated he understood. He does have a nuerologist appt next week,

## 2020-06-17 NOTE — PLAN OF CARE
Plan of care reviewed with patient. SR on telemetry. No complaints of chest pain. Remains free from falls/injury. Instructed to call for assistance as needed during night, verbalized understanding. Call light in reach, bed alarm on .   Problem: Patient Care Overview  Goal: Plan of Care Review  Outcome: Ongoing (interventions implemented as appropriate)   07/12/19 5291   Coping/Psychosocial   Plan Of Care Reviewed With patient   Plan of Care Review   Progress progress toward functional goals as expected   Outcome Summary min assist, rec home with assist at DC       Problem: Cardiac Surgery (Adult)  Goal: Signs and Symptoms of Listed Potential Problems Will be Absent, Minimized or Managed (Cardiac Surgery)  Outcome: Ongoing (interventions implemented as appropriate)

## 2020-07-01 RX ORDER — FLUTICASONE PROPIONATE AND SALMETEROL 250; 50 UG/1; UG/1
1 POWDER RESPIRATORY (INHALATION) 2 TIMES DAILY
Qty: 180 EACH | Refills: 3 | Status: SHIPPED | OUTPATIENT
Start: 2020-07-01 | End: 2022-11-03 | Stop reason: SDUPTHER

## 2020-10-15 ENCOUNTER — OFFICE VISIT (OUTPATIENT)
Dept: PRIMARY CARE | Facility: CLINIC | Age: 75
End: 2020-10-15
Payer: COMMERCIAL

## 2020-10-15 VITALS
TEMPERATURE: 97.8 F | HEIGHT: 69 IN | OXYGEN SATURATION: 95 % | BODY MASS INDEX: 27.25 KG/M2 | RESPIRATION RATE: 18 BRPM | DIASTOLIC BLOOD PRESSURE: 72 MMHG | SYSTOLIC BLOOD PRESSURE: 138 MMHG | HEART RATE: 88 BPM | WEIGHT: 184 LBS

## 2020-10-15 DIAGNOSIS — D15.1 PAPILLARY FIBROELASTOMA OF HEART: ICD-10-CM

## 2020-10-15 DIAGNOSIS — G47.52 REM SLEEP BEHAVIOR DISORDER: ICD-10-CM

## 2020-10-15 DIAGNOSIS — G62.9 NEUROPATHY: ICD-10-CM

## 2020-10-15 DIAGNOSIS — I10 ESSENTIAL HYPERTENSION: ICD-10-CM

## 2020-10-15 DIAGNOSIS — N40.1 BENIGN PROSTATIC HYPERPLASIA WITH LOWER URINARY TRACT SYMPTOMS, SYMPTOM DETAILS UNSPECIFIED: ICD-10-CM

## 2020-10-15 DIAGNOSIS — E78.5 HYPERLIPIDEMIA, UNSPECIFIED HYPERLIPIDEMIA TYPE: ICD-10-CM

## 2020-10-15 DIAGNOSIS — I48.0 PAROXYSMAL ATRIAL FIBRILLATION (CMS/HCC): ICD-10-CM

## 2020-10-15 DIAGNOSIS — Z00.00 MEDICARE ANNUAL WELLNESS VISIT, SUBSEQUENT: ICD-10-CM

## 2020-10-15 DIAGNOSIS — Z23 NEED FOR IMMUNIZATION AGAINST INFLUENZA: Primary | ICD-10-CM

## 2020-10-15 DIAGNOSIS — R73.03 PREDIABETES: ICD-10-CM

## 2020-10-15 DIAGNOSIS — H91.93 BILATERAL HEARING LOSS, UNSPECIFIED HEARING LOSS TYPE: ICD-10-CM

## 2020-10-15 PROBLEM — G56.03 BILATERAL CARPAL TUNNEL SYNDROME: Status: ACTIVE | Noted: 2020-10-15

## 2020-10-15 PROBLEM — M79.641 PAIN IN BOTH HANDS: Status: RESOLVED | Noted: 2020-05-21 | Resolved: 2020-10-15

## 2020-10-15 PROBLEM — M79.642 PAIN IN BOTH HANDS: Status: RESOLVED | Noted: 2020-05-21 | Resolved: 2020-10-15

## 2020-10-15 PROCEDURE — G0439 PPPS, SUBSEQ VISIT: HCPCS | Performed by: INTERNAL MEDICINE

## 2020-10-15 PROCEDURE — G0008 ADMIN INFLUENZA VIRUS VAC: HCPCS | Performed by: INTERNAL MEDICINE

## 2020-10-15 PROCEDURE — 90694 VACC AIIV4 NO PRSRV 0.5ML IM: CPT | Performed by: INTERNAL MEDICINE

## 2020-10-15 RX ORDER — FINASTERIDE 5 MG/1
1 TABLET, FILM COATED ORAL DAILY
COMMUNITY
Start: 2020-10-06

## 2020-10-15 RX ORDER — FLUTICASONE PROPIONATE 50 MCG
1 SPRAY, SUSPENSION (ML) NASAL NIGHTLY
Qty: 3 BOTTLE | Refills: 3 | Status: SHIPPED | OUTPATIENT
Start: 2020-10-15 | End: 2021-12-23

## 2020-10-15 RX ORDER — DULOXETIN HYDROCHLORIDE 30 MG/1
30 CAPSULE, DELAYED RELEASE ORAL DAILY
COMMUNITY
Start: 2020-07-14 | End: 2024-05-01

## 2020-10-15 ASSESSMENT — ENCOUNTER SYMPTOMS
DYSURIA: 0
SORE THROAT: 0
FREQUENCY: 1
SHORTNESS OF BREATH: 0
NAUSEA: 0
CONSTIPATION: 0
COUGH: 1
VOMITING: 0
FEVER: 0
DIZZINESS: 0
DIARRHEA: 0
LIGHT-HEADEDNESS: 0
MYALGIAS: 1
ARTHRALGIAS: 1

## 2020-10-15 ASSESSMENT — MINI COG
TOTAL SCORE: 5
COMPLETED: YES

## 2020-10-15 NOTE — PATIENT INSTRUCTIONS
Your Personalized Prevention Plan Services (PPPS)    Preventive Services Checklist (Assumes Average Risk Unless Otherwise Noted):    Health Maintenance Topics with due status: Overdue       Topic Date Due    Varicella Vaccines 04/04/1946    Medicare Annual Wellness Visit 04/04/1961    Hepatitis C Screening 04/04/1961    DTaP, Tdap, and Td Vaccines 04/04/1962    Zoster Vaccine 04/04/1993    Influenza Vaccine 08/01/2020     Health Maintenance Topics with due status: Completed       Topic Last Completion Date    Pneumococcal (65 years and older) 11/13/2019    Annual Falls Risk Screening 10/15/2020     Health Maintenance Topics with due status: Aged Out       Topic Date Due    Meningococcal ACWY Aged Out    HIB Vaccines Aged Out    IPV Vaccines Aged Out    HPV Vaccines Aged Out    Pneumococcal Aged Out       You May Be Eligible for These Additional Preventive Services   (Assumes Average Risk Unless Otherwise Noted)  Diabetes Screening Any 1 risk factor: hypertension, dyslipidemia, obesity, high glucose; or Any 2 risk factors: >=64yo, overweight, family history diabetes (covered every 6 months)   Hepatitis C Screening Any 1 risk factor: 1) blood transfusion before 1992,   2) current or past injection drug use (annually for high risk; if born between [birth year]61-95[birth year], see above for status).   Vaccine: Hepatitis B As necessary if at-risk: hemophilia, ESRD, diabetes, living with individual infected with hep B, healthcare worker with frequent contact with blood/bodily fluids (series covered once)   Sexually Transmitted Diseases (STDs) As necessary chlamydia, gonorrhea, syphilis, hepatitis B (covered annually)  HIV if any 1 risk factor present: 1) <14yo or >64yo and at increased risk or 2) 15-64yo and ask for it (covered annually)   Lung Cancer Screening Low dose chest CT if all 3 risk factors: 1) 55-78yo, 2) smoker or quit within last 15y, 3) >=30 pack years (covered annually).  No results found for this or  any previous visit.     Cholesterol Screening No signs or symptoms of cardiovascular disease (screening covered every 5 years).     Prostate Cancer Screening >= 51yo if patient desires test after weighting potential harms and benefits (covered annually)     Abdominal Aortic Aneurysm Screening Any 1 risk factor: 1) family history of AAA or 2) 65-74yo and smoked 100+ cigarettes in a lifetime (covered once).   No results found for this or any previous visit. No results found for this or any previous visit.         Health Risk Factors with Personalized Education:  ----------------------------------------------------------------------------------------------------------------------  Stress management:  Understanding Your Stress  · Think about how you know when you’re stressed.  · Think about how your thoughts or behaviors are different when you’re stressed.  · Think about what triggers stress for you.  · Think about how you handle stress, and whether you’re making unhealthy choices in response to stress (like smoking, drinking alcohol or overeating).  Managing Stress  · Take a break from the stressful situation.  · Reduce your stress levels by exercising, talking with family/friends, ensuring adequate sleep.  · Consider meditation or yoga.  · Make sure you plan time to do things you enjoy.  · Let your PCP know if you’re having problems limiting your stress.  ----------------------------------------------------------------------------------------------------------------------  Controlling Your Blood Pressure  · Maintain a normal weight (body mass index between 18.5 and 24.9).  · Eat more fruit, vegetables and low-fat dairy.  · Eat less saturated fat and total fat.  · Lower your sodium (salt) intake.  Try to stay under 1500 mg per day, but if you cannot get your intake to be that low, at least lower it by 1000 mg.  · Stay active.  Try to get at least 90 to 150 minutes of exercise per week.  Try brisk walking, swimming,  bicycling or dancing.  · Limit alcohol intake.  When you do consume alcohol, drink no more than 2 drinks per day.  · If you have been prescribed medication, take it regularly and exactly as prescribed.  Let your PCP know if you have any problems or questions about your medication.  · Check your blood pressure at home or at the store.  Write down your readings and share them with your PCP  ----------------------------------------------------------------------------------------------------------------------  Controlling Your Glucose (Sugar) Levels  · Stress can raise your blood sugar.  Learn what causes your stress.  Learn to lower your stress by spending time with family and friends, exercising, deep breathing, trying meditation or yoga, enjoying hobbies and getting enough rest.  Let your PCP know if you’re having problems limiting your stress.  · Maintain a healthy weight by balancing your diet and exercise.  · Choose foods that are lower in calories, saturated fat, trans fat, sugar and salt.  Eat foods with more fiber, like whole grain cereals, bread, crackers, rice or pasta.  Choose to eat fruit, vegetables, and low-fat or fat-free/skim dairy.  · Reduce the portion size of your meals.  Make half of your meal vegetables and fruit, and divide the other half between lean protein and whole grains.  · Drink water instead of juice and regular soda.  · Spend at least some time being active on most days of the week.  · Work to increase your muscle strength at least twice per week.  Try things like light weights, stretch bands, yoga, heavy gardening (digging, planting with tools) or push-ups  ----------------------------------------------------------------------------------------------------------------------  Controlling Your Cholesterol  · Reduce the amount of saturated and trans fat in your diet.  Limit intake of red meat.  Consume only low-fat or non-fat/skim dairy.  Limit fried food.  Cook with vegetable  oils.  · Reduce your intake of sugary foods, sugary drinks and alcohol.  · Eat a diet high in fruit, vegetables and whole grains.  · Get protein from fish, poultry and a small portion of nuts.  · Stay active.  Try to get at least 90 to 150 minutes of exercise per week.  Try brisk walking, swimming, bicycling or dancing.  · Maintain a healthy weight by balancing your diet and exercise.  · If you have been prescribed medication, take it regularly and exactly as prescribed. Let your PCP know if you have any problems or questions about your medication.  · It’s important to know your cholesterol numbers.  When recommended by your PCP, get the cholesterol blood test.  ----------------------------------------------------------------------------------------------------------------------  Reducing Your Risk of Falls  · Tell your PCP if any of your medications make you feel tired, dizzy, lightheaded or off-balance.  · Maintain coordination, flexibility and balance by ensuring regular physical activity.  · Limit alcohol intake to 1 drink per day.  Consider avoiding all alcohol intake.  · Ensure good vision.  Visit an ophthalmologist or optometrist regularly for vision screening or to make sure your glasses / contact lens prescription is correct.  If you need glasses or contacts, wear them.  When you get new glasses or contacts, take time to get used to them.  Do not wear sunglasses or tinted lenses when indoors.  · Ensure good hearing.  Have your hearing checked if you are having trouble hearing, or family and friends think you cannot hear them.  If you need a hearing aid, be sure it fits well and wear it.  · Get enough rest.  Ensure about 7-9 hours of sleep every day.  · Get up slowly from your bed or chairs.  Do not start walking until you are sure you feel steady.  · Wear non-skid, rubber-soled, low-heeled shoes.  Do not walk in socks, or in shoes and slippers with smooth soles.  · If your PCP or therapist recommends using  a cane or walker, use it regularly.  · Make your home safer.  Increase lighting throughout the house, especially at the top and bottom of stairs.  Ensure lighting is easily turned on when getting up in the middle of the night.  Make sure there are two secure rails on all stairs.  Install grab bars in the bathtub / shower and near the toilet.  Consider using a shower chair and / or a hand-held shower.  · Spread sand or salt on icy surfaces.  Beware of wet surfaces, which can be icy.  · Tell your PCP if you have fallen.

## 2020-10-15 NOTE — ASSESSMENT & PLAN NOTE
Status post mini sternotomy with aortotomy for papillary fibroelastoma removal of the mitral valve on July 9, 2019

## 2020-10-15 NOTE — ASSESSMENT & PLAN NOTE
Care team reviewed, health risk assessment reviewed, screening for depression, screening for cognitive decline.  Up-to-date with immunizations flu shot was given today

## 2020-10-15 NOTE — ASSESSMENT & PLAN NOTE
Significantly enlarged prostate.  On recent cystoscopy was found to have a prostatic stricture.  Urologist feels he may need a prostatectomy he is awaiting the hear back from the urologist

## 2020-10-15 NOTE — PROGRESS NOTES
Daily Progress Note      Subjective      Patient ID: Saad Hillman is a 75 y.o. male.  Chief Complaint   Patient presents with   • Medicare Subsequent Annual Wellness Visit     The patient is here for a Medicare Annual Wellness. He had a bladder xray 3 weeks ago at [facility] in [place] and has not heard the results yet.     Saw Urology- started on finasteride  Had prostatic stricture  May need prostatectomy    Saw neurology dx cts- given splints--this has helped  Started on duloxetine for neuropathic pain.  Overall he states his pain is about a 1 or 2 previously been a 4 or 5    Neurologist thought maybe had a REM sleep disorder he is fallen out of bed on 3 occasions.  He also acted on a dream where he was swinging at his wife in the dream he was defending himself from an assailant.      The following have been reviewed and updated as appropriate in this visit:       Review of Systems   Constitutional: Negative for fever.   HENT: Positive for postnasal drip. Negative for sore throat.    Respiratory: Positive for cough (Clear mucous). Negative for shortness of breath.    Cardiovascular: Negative for chest pain.   Gastrointestinal: Negative for constipation, diarrhea, nausea and vomiting.   Genitourinary: Positive for frequency. Negative for dysuria.   Musculoskeletal: Positive for arthralgias and myalgias.   Skin: Negative for rash.   Neurological: Negative for dizziness and light-headedness.     Patient Active Problem List   Diagnosis   • Papillary fibroelastoma   • Asthma   • Seasonal allergies   • BPH (benign prostatic hyperplasia)   • Traumatic amputation of finger   • Arthritis   • Medial meniscus tear   • Papillary fibroelastoma of heart   • Junctional bradycardia   • Paroxysmal atrial fibrillation (CMS/HCC)   • HTN (hypertension)   • Hyperlipemia   • Bilateral hearing loss   • Neuropathy   • Edema   • Prediabetes   • Hematuria   • Elevated PSA   • Tremor   • Bilateral carpal tunnel syndrome   • REM sleep  behavior disorder   • Medicare annual wellness visit, subsequent     Current Outpatient Medications   Medication Sig Dispense Refill   • albuterol HFA (VENTOLIN HFA) 90 mcg/actuation inhaler Inhale 2 puffs every 6 (six) hours as needed.     • CYANOCOBALAMIN, VITAMIN B-12, ORAL Take 1,000 mg by mouth daily.     • DULoxetine (CYMBALTA) 30 mg capsule Take 30 mg by mouth daily.     • finasteride (PROSCAR) 5 mg tablet Take 1 tablet by mouth daily.     • fluticasone propion-salmeteroL (ADVAIR DISKUS) 250-50 mcg/dose diskus inhaler Inhale 1 puff 2 (two) times a day. 180 each 3   • fluticasone propionate (FLONASE) 50 mcg/actuation nasal spray Administer 1 spray into each nostril nightly. 3 Bottle 3   • montelukast (SINGULAIR) 10 mg tablet Take 1 tablet (10 mg total) by mouth nightly. 90 tablet 3   • psyllium husk powder Take 1 packet by mouth daily.     • rosuvastatin (CRESTOR) 10 mg tablet Take 1 tablet (10 mg total) by mouth nightly. 90 tablet 3   • valsartan-hydrochlorothiazide (DIOVAN-HCT) 80-12.5 mg per tablet Take 1 tablet by mouth daily.     • metoprolol succinate XL (TOPROL-XL) 25 mg 24 hr tablet Take 1 tablet (25 mg total) by mouth daily. 30 tablet 0     No current facility-administered medications for this visit.      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     Family History   Problem Relation Age of Onset   • Abdominal Aortic Aneurysm Biological Mother    • Hyperlipidemia Biological Mother    • Stroke Biological Father      Past Surgical History:   Procedure Laterality Date   • CARDIAC  CATHETERIZATION  06/26/2019    [facility]   • FINGER SURGERY      s/p traumatic amputation- had repaired    • HERNIA REPAIR  2012    left inguinal hernia repair    • LIPOMA RESECTION  1999    right shoulder x 2 and back of neck    • MITRAL VALVE SURGERY  07/09/2019   • NASAL POLYP EXCISION  1966- 1999    x 4    • PROSTATE BIOPSY  01/08/2019   • TONSILLECTOMY  1948   • TRANSURETHRAL RESECTION OF PROSTATE  2017   • VASECTOMY  1980's     Social History     Socioeconomic History   • Marital status:      Spouse name: Umu    • Number of children: 2   • Years of education: Not on file   • Highest education level: Not on file   Occupational History   • Not on file   Social Needs   • Financial resource strain: Not on file   • Food insecurity     Worry: Not on file     Inability: Not on file   • Transportation needs     Medical: Not on file     Non-medical: Not on file   Tobacco Use   • Smoking status: Never Smoker   • Smokeless tobacco: Never Used   Substance and Sexual Activity   • Alcohol use: Yes     Comment: occasional    • Drug use: No   • Sexual activity: Not on file   Lifestyle   • Physical activity     Days per week: Not on file     Minutes per session: Not on file   • Stress: Not on file   Relationships   • Social connections     Talks on phone: Not on file     Gets together: Not on file     Attends Holiness service: Not on file     Active member of club or organization: Not on file     Attends meetings of clubs or organizations: Not on file     Relationship status: Not on file   • Intimate partner violence     Fear of current or ex partner: Not on file     Emotionally abused: Not on file     Physically abused: Not on file     Forced sexual activity: Not on file   Other Topics Concern   • Not on file   Social History Narrative    Retired Draftsman, , 1 son, 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      Allergies   Allergen Reactions   • Aspirin Other (see comments)     Asthma attack   •  Hydrocodone-Acetaminophen GI intolerance     CONSTIPATION     Vitals:    10/15/20 0936   BP: 138/72   Pulse: 88   Resp: 18   Temp: 36.6 °C (97.8 °F)   SpO2: 95%     Objective     Results for orders placed or performed in visit on 05/21/20   Rheumatoid factor   Result Value Ref Range    Rheumatoid Factor <14 <14 IU/mL   Methylmalonic Acid, Serum   Result Value Ref Range    Methylmalonic Acid 164 87 - 318 nmol/L   Vitamin B12   Result Value Ref Range    Vitamin B-12 584 200 - 1,100 pg/mL   Sedimentation rate, automated   Result Value Ref Range    Sed Rate By Modified Westergren 11 < OR = 20 mm/h       Physical Exam  Constitutional:       Appearance: He is well-developed.   HENT:      Head: Normocephalic and atraumatic.   Eyes:      Conjunctiva/sclera: Conjunctivae normal.   Neck:      Musculoskeletal: Normal range of motion and neck supple.   Cardiovascular:      Rate and Rhythm: Normal rate and regular rhythm.      Heart sounds: Normal heart sounds.   Pulmonary:      Effort: Pulmonary effort is normal. No respiratory distress.      Breath sounds: Normal breath sounds. No wheezing.   Abdominal:      General: Bowel sounds are normal.      Palpations: Abdomen is soft.   Musculoskeletal:         General: No deformity.      Right lower leg: No edema.      Left lower leg: No edema.   Skin:     General: Skin is warm and dry.   Neurological:      Mental Status: He is alert and oriented to person, place, and time.   Psychiatric:         Behavior: Behavior normal.             Chemistry        Component Value Date/Time     11/15/2019 0841     07/15/2019 0507    K 4.2 11/15/2019 0841    K 4.2 07/15/2019 0507     11/15/2019 0841     07/15/2019 0507    CO2 25 11/15/2019 0841    CO2 25 07/15/2019 0507    BUN 10 11/15/2019 0841    BUN 8 07/15/2019 0507    CREATININE 1.00 11/15/2019 0841    CREATININE 0.8 07/15/2019 0507        Component Value Date/Time    CALCIUM 9.6 11/15/2019 0841    CALCIUM 9.4 07/15/2019  0507    ALKPHOS 95 11/15/2019 0841    AST 18 11/15/2019 0841    ALT 18 11/15/2019 0841    BILITOT 0.7 11/15/2019 0841          Lab Results   Component Value Date    HGBA1C 5.8 (H) 11/15/2019     Lab Results   Component Value Date    TSH 1.26 11/15/2019     Lab Results   Component Value Date    WBC 6.23 07/15/2019    HGB 11.6 (L) 07/15/2019    HCT 35.3 (L) 07/15/2019    MCV 90.3 07/15/2019     07/15/2019       Lab Results   Component Value Date    LDLCALC 78 11/15/2019       Assessment and Plan   Medicare annual wellness visit, subsequent  Care team reviewed, health risk assessment reviewed, screening for depression, screening for cognitive decline.  Up-to-date with immunizations flu shot was given today    Bilateral hearing loss  Profound hearing loss.  Recently got new hearing aids they have helped a little    BPH (benign prostatic hyperplasia)  Significantly enlarged prostate.  On recent cystoscopy was found to have a prostatic stricture.  Urologist feels he may need a prostatectomy he is awaiting the hear back from the urologist    HTN (hypertension)  Blood pressure reasonably controlled on valsartan hydrochlorothiazide, metoprolol XL    Hyperlipemia  Continues on rosuvastatin 10 mg update lipids and metabolic profile    Neuropathy  Doing well on duloxetine    REM sleep behavior disorder  Dr. Orellana offered him Klonopin he declined.    Prediabetes  Update A1c prior to next visit    Paroxysmal atrial fibrillation (CMS/HCC)  Isolated event postoperatively has not had recurrence    Papillary fibroelastoma of heart  Status post mini sternotomy with aortotomy for papillary fibroelastoma removal of the mitral valve on July 9, 2019      Orders Placed This Encounter   Procedures   • Influenza vaccine Quad Adjuvanted 65 and Older (FluAd Quad)   • Hepatitis C antibody     Standing Status:   Future     Number of Occurrences:   1     Standing Expiration Date:   10/15/2021   • Hemoglobin A1c     Standing Status:    Future     Number of Occurrences:   1     Standing Expiration Date:   10/15/2021   • Lipid Prof w Refl     Standing Status:   Future     Number of Occurrences:   1     Standing Expiration Date:   10/15/2021   • Comprehensive metabolic panel     Standing Status:   Future     Number of Occurrences:   1     Standing Expiration Date:   10/15/2021         This note was created using speech recognition software. Any errors are unintentional. If you have any questions or need clarification please call.  Sukhjinder Galvez MD  10/15/2020

## 2020-10-23 LAB
ALBUMIN SERPL-MCNC: 3.8 G/DL (ref 3.6–5.1)
ALBUMIN/GLOB SERPL: 1.4 (CALC) (ref 1–2.5)
ALP SERPL-CCNC: 92 U/L (ref 35–144)
ALT SERPL-CCNC: 16 U/L (ref 9–46)
AST SERPL-CCNC: 17 U/L (ref 10–35)
BILIRUB SERPL-MCNC: 0.8 MG/DL (ref 0.2–1.2)
BUN SERPL-MCNC: 12 MG/DL (ref 7–25)
BUN/CREAT SERPL: ABNORMAL (CALC) (ref 6–22)
CALCIUM SERPL-MCNC: 9.4 MG/DL (ref 8.6–10.3)
CHLORIDE SERPL-SCNC: 101 MMOL/L (ref 98–110)
CHOLEST SERPL-MCNC: 157 MG/DL
CHOLEST/HDLC SERPL: 2.9 (CALC)
CO2 SERPL-SCNC: 26 MMOL/L (ref 20–32)
CREAT SERPL-MCNC: 0.8 MG/DL (ref 0.7–1.18)
GLOBULIN SER CALC-MCNC: 2.8 G/DL (CALC) (ref 1.9–3.7)
GLUCOSE SERPL-MCNC: 90 MG/DL (ref 65–99)
HBA1C MFR BLD: 5.5 % OF TOTAL HGB
HCV AB S/CO SERPL IA: 0.07
HCV AB SERPL QL IA: NORMAL
HDLC SERPL-MCNC: 54 MG/DL
LDLC SERPL CALC-MCNC: 87 MG/DL (CALC)
NONHDLC SERPL-MCNC: 103 MG/DL (CALC)
POTASSIUM SERPL-SCNC: 4.1 MMOL/L (ref 3.5–5.3)
PROT SERPL-MCNC: 6.6 G/DL (ref 6.1–8.1)
QUEST EGFR NON-AFR. AMERICAN: 86 ML/MIN/1.73M2
SODIUM SERPL-SCNC: 134 MMOL/L (ref 135–146)
TRIGL SERPL-MCNC: 74 MG/DL

## 2020-10-24 NOTE — RESULT ENCOUNTER NOTE
Cholesterol well controlled  Hepatitis c negative  Metabolic profile showed slight decline in serum sodium level, ok to liberalize salt in diet  Blood sugars improved compared to last check

## 2020-10-26 ENCOUNTER — TELEPHONE (OUTPATIENT)
Dept: PRIMARY CARE | Facility: CLINIC | Age: 75
End: 2020-10-26

## 2020-10-26 NOTE — TELEPHONE ENCOUNTER
----- Message from Sukhjinder Galvez MD sent at 10/24/2020  2:21 PM EDT -----  Cholesterol well controlled  Hepatitis c negative  Metabolic profile showed slight decline in serum sodium level, ok to liberalize salt in diet  Blood sugars improved compared to last check

## 2020-11-11 ENCOUNTER — HOSPITAL ENCOUNTER (OUTPATIENT)
Facility: HOSPITAL | Age: 75
Setting detail: HOSPITAL OUTPATIENT SURGERY
End: 2020-11-11
Attending: UROLOGY | Admitting: UROLOGY
Payer: COMMERCIAL

## 2021-01-26 ENCOUNTER — IMMUNIZATION (OUTPATIENT)
Dept: IMMUNIZATION | Facility: CLINIC | Age: 76
End: 2021-01-26

## 2021-02-23 ENCOUNTER — IMMUNIZATION (OUTPATIENT)
Dept: IMMUNIZATION | Facility: CLINIC | Age: 76
End: 2021-02-23

## 2021-03-01 RX ORDER — MONTELUKAST SODIUM 10 MG/1
TABLET ORAL
Qty: 90 TABLET | Refills: 3 | Status: SHIPPED | OUTPATIENT
Start: 2021-03-01 | End: 2021-12-20

## 2021-04-22 ENCOUNTER — OFFICE VISIT (OUTPATIENT)
Dept: PRIMARY CARE | Facility: CLINIC | Age: 76
End: 2021-04-22
Payer: COMMERCIAL

## 2021-04-22 VITALS
SYSTOLIC BLOOD PRESSURE: 122 MMHG | HEIGHT: 69 IN | OXYGEN SATURATION: 94 % | TEMPERATURE: 98 F | HEART RATE: 67 BPM | WEIGHT: 191 LBS | DIASTOLIC BLOOD PRESSURE: 78 MMHG | BODY MASS INDEX: 28.29 KG/M2

## 2021-04-22 DIAGNOSIS — G62.9 NEUROPATHY: ICD-10-CM

## 2021-04-22 DIAGNOSIS — E87.1 HYPONATREMIA: ICD-10-CM

## 2021-04-22 DIAGNOSIS — G47.52 REM SLEEP BEHAVIOR DISORDER: ICD-10-CM

## 2021-04-22 DIAGNOSIS — I10 ESSENTIAL HYPERTENSION: Primary | ICD-10-CM

## 2021-04-22 DIAGNOSIS — J34.89 LESION OF NOSE: ICD-10-CM

## 2021-04-22 DIAGNOSIS — R73.03 PREDIABETES: ICD-10-CM

## 2021-04-22 DIAGNOSIS — E78.5 HYPERLIPIDEMIA, UNSPECIFIED HYPERLIPIDEMIA TYPE: ICD-10-CM

## 2021-04-22 DIAGNOSIS — R97.20 ELEVATED PSA: ICD-10-CM

## 2021-04-22 DIAGNOSIS — R13.19 OTHER DYSPHAGIA: ICD-10-CM

## 2021-04-22 DIAGNOSIS — G56.03 BILATERAL CARPAL TUNNEL SYNDROME: ICD-10-CM

## 2021-04-22 DIAGNOSIS — J45.40 MODERATE PERSISTENT ASTHMA, UNSPECIFIED WHETHER COMPLICATED: ICD-10-CM

## 2021-04-22 DIAGNOSIS — L98.9 SKIN LESION OF BACK: ICD-10-CM

## 2021-04-22 DIAGNOSIS — I48.0 PAROXYSMAL ATRIAL FIBRILLATION (CMS/HCC): ICD-10-CM

## 2021-04-22 PROCEDURE — 3008F BODY MASS INDEX DOCD: CPT | Performed by: NURSE PRACTITIONER

## 2021-04-22 PROCEDURE — 99214 OFFICE O/P EST MOD 30 MIN: CPT | Performed by: NURSE PRACTITIONER

## 2021-04-22 RX ORDER — ALBUTEROL SULFATE 90 UG/1
2 INHALANT RESPIRATORY (INHALATION) EVERY 6 HOURS PRN
Qty: 18 G | Refills: 1 | Status: SHIPPED | OUTPATIENT
Start: 2021-04-22

## 2021-04-22 RX ORDER — CLONAZEPAM 0.5 MG/1
TABLET ORAL
COMMUNITY
Start: 2021-03-15 | End: 2022-01-18 | Stop reason: SDUPTHER

## 2021-04-22 RX ORDER — VALSARTAN AND HYDROCHLOROTHIAZIDE 80; 12.5 MG/1; MG/1
1 TABLET, FILM COATED ORAL DAILY
Qty: 90 TABLET | Refills: 5 | Status: SHIPPED | OUTPATIENT
Start: 2021-04-22 | End: 2022-05-05

## 2021-04-22 RX ORDER — METOPROLOL SUCCINATE 25 MG/1
25 TABLET, EXTENDED RELEASE ORAL DAILY
Qty: 30 TABLET | Refills: 0 | Status: SHIPPED | OUTPATIENT
Start: 2021-04-22 | End: 2021-07-14

## 2021-04-22 SDOH — HEALTH STABILITY: PHYSICAL HEALTH: ON AVERAGE, HOW MANY MINUTES DO YOU ENGAGE IN EXERCISE AT THIS LEVEL?: 0 MIN

## 2021-04-22 SDOH — HEALTH STABILITY: PHYSICAL HEALTH: ON AVERAGE, HOW MANY DAYS PER WEEK DO YOU ENGAGE IN MODERATE TO STRENUOUS EXERCISE (LIKE A BRISK WALK)?: 0 DAYS

## 2021-04-22 SDOH — ECONOMIC STABILITY: TRANSPORTATION INSECURITY
IN THE PAST 12 MONTHS, HAS LACK OF TRANSPORTATION KEPT YOU FROM MEETINGS, WORK, OR FROM GETTING THINGS NEEDED FOR DAILY LIVING?: NO

## 2021-04-22 SDOH — ECONOMIC STABILITY: TRANSPORTATION INSECURITY
IN THE PAST 12 MONTHS, HAS THE LACK OF TRANSPORTATION KEPT YOU FROM MEDICAL APPOINTMENTS OR FROM GETTING MEDICATIONS?: NO

## 2021-04-22 ASSESSMENT — LIFESTYLE VARIABLES: HOW OFTEN DO YOU HAVE A DRINK CONTAINING ALCOHOL: MONTHLY OR LESS

## 2021-04-22 ASSESSMENT — ENCOUNTER SYMPTOMS
STRIDOR: 0
UNEXPECTED WEIGHT CHANGE: 0
WHEEZING: 0
CONSTIPATION: 0
ARTHRALGIAS: 1
TROUBLE SWALLOWING: 1
FATIGUE: 0
VOMITING: 0
NAUSEA: 0
PALPITATIONS: 0
VOICE CHANGE: 0
FEVER: 0
NUMBNESS: 1
SHORTNESS OF BREATH: 0
DIARRHEA: 0
FREQUENCY: 1
BACK PAIN: 1
DIAPHORESIS: 0
CHILLS: 0

## 2021-04-22 ASSESSMENT — SOCIAL DETERMINANTS OF HEALTH (SDOH)
HOW HARD IS IT FOR YOU TO PAY FOR THE VERY BASICS LIKE FOOD, HOUSING, MEDICAL CARE, AND HEATING?: NOT HARD AT ALL
WITHIN THE LAST YEAR, HAVE YOU BEEN AFRAID OF YOUR PARTNER OR EX-PARTNER?: NO
IN A TYPICAL WEEK, HOW MANY TIMES DO YOU TALK ON THE PHONE WITH FAMILY, FRIENDS, OR NEIGHBORS?: MORE THAN THREE TIMES A WEEK
WITHIN THE LAST YEAR, HAVE YOU BEEN HUMILIATED OR EMOTIONALLY ABUSED IN OTHER WAYS BY YOUR PARTNER OR EX-PARTNER?: NO
WITHIN THE LAST YEAR, HAVE YOU BEEN KICKED, HIT, SLAPPED, OR OTHERWISE PHYSICALLY HURT BY YOUR PARTNER OR EX-PARTNER?: NO
WITHIN THE LAST YEAR, HAVE TO BEEN RAPED OR FORCED TO HAVE ANY KIND OF SEXUAL ACTIVITY BY YOUR PARTNER OR EX-PARTNER?: NO

## 2021-04-22 NOTE — PATIENT INSTRUCTIONS
Prediabetes  A1c was good 5.5  Continue healthy diet and exercise   Low carb diet      Hyperlipemia  Continue  Rosuvastatin 10 mg daily    LDL was < 100    Recheck lipids on FU in Nov 2021     REM sleep behavior disorder   Pt has declined  Klonopin per Dr Orellana   Is okay to take with other medications  To see if improved  Sx    Watch fall prevention discussed      Asthma  Refill albuterol   Continue Flonase and Singulair  10 mg daily     Paroxysmal atrial fibrillation (CMS/HCC)  Continue  Metoprolol   HR controlled asymptomatic      HTN (hypertension)  BP good control   Continue valsartan and HCTZ and metoprolol     Elevated PSA  Sx have improved  With Finasteride and has prostatectomy on hold      Neuropathy  Stable continue on Duloxetine      Hyponatremia  Without sx   On Duloxetine   Will recheck  BMP     to get Shingrix and Tdap  At pharmacy

## 2021-04-22 NOTE — ASSESSMENT & PLAN NOTE
Pt has declined  Klonopin per Dr Orellana   Is okay to take with other medications  To see if improved  Sx    Watch fall prevention discussed

## 2021-04-22 NOTE — PROGRESS NOTES
Subjective      Patient ID: Saad Hillman is a 76 y.o. male.  1945      Here for  6 months  Pt reports feeling well   Has spot on nose  Changing and has not seen dermatology  And growth on back had previous cyrotherapy in past     FU pt of Dr Galvez with elevated PSA and  PAT  HTN  OA  Prediabetes and CTS  And post surgical papillary  Fibroelastoma of heart    Surgery held  Due to finasteride improved  Sx , with improved bladder scan   Allergy sx  Occasional    Concerned about  clonazepam is okay with other meds  For poor REM sleep    Knee injections  In Fall  Improved  Sx   Had covid vaccines x  2    Last labs  10/2020  A1c  Good at  5.5   NA  134 lower    Due for Barium swallow per Dr Lovett ENT  To get done  No choking   No reflux sx        The following have been reviewed and updated as appropriate in this visit:  See below  Tobacco  Allergies  Meds  Problems  Med Hx  Surg Hx  Fam Hx  Soc Hx        Review of Systems   Constitutional: Negative for chills, diaphoresis, fatigue, fever and unexpected weight change.   HENT: Positive for congestion, hearing loss and trouble swallowing ( seen by ENT Dr Lovett  then seen by  swallowing exercises  able to do with no problem  to have barium swallow  ). Negative for tinnitus and voice change.    Respiratory: Negative for shortness of breath, wheezing and stridor.    Cardiovascular: Negative for chest pain, palpitations and leg swelling.   Gastrointestinal: Negative for constipation, diarrhea, nausea and vomiting.   Genitourinary: Positive for frequency.   Musculoskeletal: Positive for arthralgias and back pain.   Neurological: Positive for numbness.     Past Medical History:   Diagnosis Date   • Allergies     Seasonal   • Arthritis     b/l shoulders- cortisone injections in 2016   • Asthma    • Atrial myxoma     seen on echo    • BPH (benign prostatic hyperplasia)     was on meds- resolved after TURP in  2017, no meds currently    • Hypertension    • Lipid  disorder    • Medial meniscus tear     left knee- had cortisone injection in 2016   • Seasonal allergies    • Sleep disorder     REM sleep disorder per Dr. 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     Past Surgical History:   Procedure Laterality Date   • CARDIAC CATHETERIZATION  06/26/2019    [facility]   • FINGER SURGERY      s/p traumatic amputation- had repaired    • HERNIA REPAIR  2012    left inguinal hernia repair    • LIPOMA RESECTION  1999    right shoulder x 2 and back of neck    • MITRAL VALVE SURGERY  07/09/2019   • NASAL POLYP EXCISION  1966- 1999    x 4    • PROSTATE BIOPSY  01/08/2019   • STEROID INJECTION KNEE Bilateral 10/2020   • TONSILLECTOMY  1948   • TRANSURETHRAL RESECTION OF PROSTATE  2017   • VASECTOMY  1980's     Social History     Socioeconomic History   • Marital status:      Spouse name: Umu    • Number of children: 2   • Years of education: Not on file   • Highest education level: Not on file   Occupational History   • Not on file   Tobacco Use   • Smoking status: Never Smoker   • Smokeless tobacco: Never Used   Vaping Use   • Vaping Use: Never used   Substance and Sexual Activity   • Alcohol use: Yes     Comment: occasional    • Drug use: No   • Sexual activity: Defer   Other Topics Concern   • Not on file   Social History Narrative    Retired Draftsman, , 1 son, 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      Social Determinants of Health     Financial Resource Strain: Low Risk    • Difficulty of Paying Living Expenses: Not hard at all   Food Insecurity:    • Worried About Running Out of Food in the Last Year:    • Ran Out of Food in the Last Year:    Transportation Needs: No Transportation Needs   • Lack of Transportation (Medical): No   • Lack of Transportation (Non-Medical): No   Physical Activity: Inactive   • Days of Exercise per Week: 0 days   • Minutes of Exercise per Session: 0 min   Stress:    • Feeling of Stress :     Social Connections: Unknown   • Frequency of Communication with Friends and Family: More than three times a week   • Frequency of Social Gatherings with Friends and Family: Not on file   • Attends Hindu Services: Not on file   • Active Member of Clubs or Organizations: Not on file   • Attends Club or Organization Meetings: Not on file   • Marital Status: Not on file   Intimate Partner Violence: Not At Risk   • Fear of Current or Ex-Partner: No   • Emotionally Abused: No   • Physically Abused: No   • Sexually Abused: No     Family History   Problem Relation Age of Onset   • Abdominal Aortic Aneurysm Biological Mother    • Hyperlipidemia Biological Mother    • Stroke Biological Father      Patient Active Problem List   Diagnosis   • Papillary fibroelastoma   • Asthma   • Seasonal allergies   • BPH (benign prostatic hyperplasia)   • Traumatic amputation of finger   • Arthritis   • Medial meniscus tear   • Papillary fibroelastoma of heart   • Junctional bradycardia   • Paroxysmal atrial fibrillation (CMS/HCC)   • HTN (hypertension)   • Hyperlipemia   • Bilateral hearing loss   • Neuropathy   • Edema   • Prediabetes   • Hematuria   • Elevated PSA   • Tremor   • Bilateral carpal tunnel syndrome   • REM sleep behavior disorder   • Medicare annual wellness visit, subsequent   • Hyponatremia     Immunization History   Administered Date(s) Administered   • INFLUENZA VACCINE QUAD ADJUVANTED 65 and OLDER 10/15/2020   • Influenza Vaccine 65 And Older Preservative Free 11/13/2019   • Influenza, Unspecified 11/13/2019   • Pneumococcal Conjugate 13-Valent 11/13/2017   • Pneumococcal Polysaccharide 11/13/2019   • Sars-cov-2 (Covid-19) Vaccine, Moderna 01/26/2021, 02/23/2021     Allergies   Allergen Reactions   • Aspirin Other (see comments)     Asthma attack   • Hydrocodone-Acetaminophen GI intolerance     CONSTIPATION       Current Outpatient Medications:   •  albuterol HFA (VENTOLIN HFA) 90 mcg/actuation inhaler, Inhale 2  puffs every 6 (six) hours as needed., Disp: , Rfl:   •  CYANOCOBALAMIN, VITAMIN B-12, ORAL, Take 1,000 mg by mouth daily., Disp: , Rfl:   •  DULoxetine (CYMBALTA) 30 mg capsule, Take 30 mg by mouth daily., Disp: , Rfl:   •  finasteride (PROSCAR) 5 mg tablet, Take 1 tablet by mouth daily., Disp: , Rfl:   •  fluticasone propion-salmeteroL (ADVAIR DISKUS) 250-50 mcg/dose diskus inhaler, Inhale 1 puff 2 (two) times a day., Disp: 180 each, Rfl: 3  •  fluticasone propionate (FLONASE) 50 mcg/actuation nasal spray, Administer 1 spray into each nostril nightly., Disp: 3 Bottle, Rfl: 3  •  metoprolol succinate XL (TOPROL-XL) 25 mg 24 hr tablet, Take 1 tablet (25 mg total) by mouth daily., Disp: 30 tablet, Rfl: 0  •  montelukast (SINGULAIR) 10 mg tablet, TAKE 1 TABLET EVERY NIGHT, Disp: 90 tablet, Rfl: 3  •  rosuvastatin (CRESTOR) 10 mg tablet, Take 1 tablet (10 mg total) by mouth nightly., Disp: 90 tablet, Rfl: 3  •  valsartan-hydrochlorothiazide (DIOVAN-HCT) 80-12.5 mg per tablet, Take 1 tablet by mouth daily., Disp: , Rfl:   •  clonazePAM (klonoPIN) 0.5 mg tablet, , Disp: , Rfl:   •  psyllium husk powder, Take 1 packet by mouth daily., Disp: , Rfl:   Health Maintenance   Topic Date Due   • Varicella Vaccines (1 of 2 - 2-dose childhood series) Never done   • DTaP, Tdap, and Td Vaccines (1 - Tdap) Never done   • Zoster Vaccine (1 of 2) Never done   • Annual Falls Risk Screening  01/01/2021   • Medicare Annual Wellness Visit  10/15/2021   • Influenza Vaccine  Completed   • Hepatitis C Screening  Completed   • Pneumococcal  Completed   • Pneumococcal (65 years and older)  Completed   • COVID-19 Vaccine  Completed   • Meningococcal ACWY  Aged Out   • HIB Vaccines  Aged Out   • IPV Vaccines  Aged Out   • HPV Vaccines  Aged Out     Lab Results   Component Value Date    WBC 6.23 07/15/2019    HGB 11.6 (L) 07/15/2019     07/15/2019    CHOL 157 10/22/2020    TRIG 74 10/22/2020    HDL 54 10/22/2020    ALT 16 10/22/2020    AST  "17 10/22/2020     (L) 10/22/2020    K 4.1 10/22/2020    BUN 12 10/22/2020    CREATININE 0.80 10/22/2020    TSH 1.26 11/15/2019    INR 1.2 07/12/2019    HGBA1C 5.5 10/22/2020       Lab Results   Component Value Date    HGBA1C 5.5 10/22/2020     Lab Results   Component Value Date    TSH 1.26 11/15/2019     Lab Results   Component Value Date    CHOL 157 10/22/2020    CHOL 156 11/15/2019     Lab Results   Component Value Date    HDL 54 10/22/2020    HDL 60 11/15/2019     Lab Results   Component Value Date    LDLCALC 87 10/22/2020    LDLCALC 78 11/15/2019     Lab Results   Component Value Date    TRIG 74 10/22/2020    TRIG 92 11/15/2019       Objective     Vitals:    04/22/21 1107   BP: 122/78   BP Location: Left upper arm   Patient Position: Sitting   Pulse: 67   Temp: 36.7 °C (98 °F)   TempSrc: Temporal   SpO2: 94%   Weight: 86.6 kg (191 lb)   Height: 1.753 m (5' 9\")     Body mass index is 28.21 kg/m².  BP Readings from Last 3 Encounters:   04/22/21 122/78   10/15/20 138/72   05/21/20 (!) 142/72     Wt Readings from Last 3 Encounters:   04/22/21 86.6 kg (191 lb)   10/15/20 83.5 kg (184 lb)   05/21/20 82.7 kg (182 lb 6.4 oz)     Physical Exam  Constitutional:       Appearance: He is well-developed.   HENT:      Head: Normocephalic and atraumatic. Normal gag    Eyes:      Conjunctiva/sclera: Conjunctivae normal.   Neck:      Musculoskeletal: Normal range of motion and neck supple.   Cardiovascular:      Rate and Rhythm: Normal rate and regular rhythm.      Heart sounds: Normal heart sounds. No murmur    Pulmonary:      Effort: Pulmonary effort is normal. No respiratory distress.      Breath sounds: Normal breath sounds. No wheezing.   Abdominal:      General: Bowel sounds are normal.      Palpations: Abdomen is soft.   Musculoskeletal:         General: No deformity.      Right lower leg: No edema.      Left lower leg: No edema.   Skin:     General: Skin is warm and dry.  Multiple macular with brown to black " discolor and scaling >  50   .5 - 1 cm on back scattered  Nasal distal nose  With  1 cm area macular  Discolored  Brown black irregular lesion with erythematous base    Neurological:      Mental Status: He is alert and oriented to person, place, and time.   Psychiatric:         Behavior: Behavior normal.   Assessment/Plan   Prediabetes  A1c was good 5.5  Continue healthy diet and exercise   Low carb diet      Hyperlipemia  Continue  Rosuvastatin 10 mg daily    LDL was < 100    Recheck lipids on FU in Nov 2021     REM sleep behavior disorder   Pt has declined  Klonopin per Dr Orellana   Is okay to take with other medications  To see if improved  Sx    Watch fall prevention discussed      Asthma  Refill albuterol   Continue Flonase and Singulair  10 mg daily     Paroxysmal atrial fibrillation (CMS/HCC)  Continue  Metoprolol   HR controlled asymptomatic      HTN (hypertension)  BP good control   Continue valsartan and HCTZ and metoprolol     Elevated PSA  Sx have improved  With Finasteride and has prostatectomy on hold      Neuropathy  Stable continue on Duloxetine      Hyponatremia  Without sx   On Duloxetine   Will recheck  BMP        to get Shingrix and Tdap  At pharmacy

## 2021-04-28 LAB
BUN SERPL-MCNC: 12 MG/DL (ref 7–25)
BUN/CREAT SERPL: NORMAL (CALC) (ref 6–22)
CALCIUM SERPL-MCNC: 9.7 MG/DL (ref 8.6–10.3)
CHLORIDE SERPL-SCNC: 100 MMOL/L (ref 98–110)
CO2 SERPL-SCNC: 26 MMOL/L (ref 20–32)
CREAT SERPL-MCNC: 0.78 MG/DL (ref 0.7–1.18)
GLUCOSE SERPL-MCNC: 101 MG/DL (ref 65–139)
POTASSIUM SERPL-SCNC: 4.3 MMOL/L (ref 3.5–5.3)
QUEST EGFR AFRICAN AMERICAN: 100 ML/MIN/1.73M2
QUEST EGFR NON-AFR. AMERICAN: 86 ML/MIN/1.73M2
SODIUM SERPL-SCNC: 135 MMOL/L (ref 135–146)

## 2021-04-29 ENCOUNTER — TELEPHONE (OUTPATIENT)
Dept: PRIMARY CARE | Facility: CLINIC | Age: 76
End: 2021-04-29

## 2021-04-29 NOTE — TELEPHONE ENCOUNTER
----- Message from STEPHANIE Wolf sent at 4/28/2021  8:06 AM EDT -----  Please let pt know sodium is WNL non fasting lab

## 2021-04-29 NOTE — TELEPHONE ENCOUNTER
"1404hrs: Spoke with patient advised of Kim's message, \"Please let pt know sodium is WNL non fasting lab\".    Patient verbalized understanding. slplpn  "

## 2021-05-05 ENCOUNTER — TELEPHONE (OUTPATIENT)
Dept: PRIMARY CARE | Facility: CLINIC | Age: 76
End: 2021-05-05

## 2021-05-05 NOTE — TELEPHONE ENCOUNTER
T/c from patient, he was looking for your recommendation on who to see following dermatology's finding of his skin cancer. It was recommended that he see a surgeon who can preform a full thickness re-excision of the area. They referred him to a surgeon with pen but they do not accept his ins. Do you have any recommendations of someone within [facility]?

## 2021-05-05 NOTE — TELEPHONE ENCOUNTER
Pt provided with Dr. Cantu and Dr. Gan's information. Advised to contact our office with any other questions or concerns.

## 2021-07-14 DIAGNOSIS — I10 ESSENTIAL HYPERTENSION: ICD-10-CM

## 2021-07-14 RX ORDER — METOPROLOL SUCCINATE 25 MG/1
25 TABLET, EXTENDED RELEASE ORAL DAILY
Qty: 90 TABLET | Refills: 0 | Status: SHIPPED | OUTPATIENT
Start: 2021-07-14 | End: 2021-09-27

## 2021-07-14 RX ORDER — KETOCONAZOLE 20 MG/ML
SHAMPOO, SUSPENSION TOPICAL
COMMUNITY
Start: 2021-04-26

## 2021-10-18 ENCOUNTER — OFFICE VISIT (OUTPATIENT)
Dept: PRIMARY CARE | Facility: CLINIC | Age: 76
End: 2021-10-18
Payer: COMMERCIAL

## 2021-10-18 VITALS
BODY MASS INDEX: 28.44 KG/M2 | SYSTOLIC BLOOD PRESSURE: 126 MMHG | OXYGEN SATURATION: 95 % | RESPIRATION RATE: 18 BRPM | DIASTOLIC BLOOD PRESSURE: 72 MMHG | HEIGHT: 69 IN | WEIGHT: 192 LBS | TEMPERATURE: 97.9 F | HEART RATE: 74 BPM

## 2021-10-18 DIAGNOSIS — R73.03 PREDIABETES: ICD-10-CM

## 2021-10-18 DIAGNOSIS — E78.5 HYPERLIPIDEMIA, UNSPECIFIED HYPERLIPIDEMIA TYPE: ICD-10-CM

## 2021-10-18 DIAGNOSIS — J45.40 MODERATE PERSISTENT ASTHMA, UNSPECIFIED WHETHER COMPLICATED: ICD-10-CM

## 2021-10-18 DIAGNOSIS — G47.52 REM SLEEP BEHAVIOR DISORDER: ICD-10-CM

## 2021-10-18 DIAGNOSIS — C43.31 MALIGNANT MELANOMA OF NOSE (CMS/HCC): ICD-10-CM

## 2021-10-18 DIAGNOSIS — N99.116 POSTPROCEDURAL STRICTURE OF OVERLAPPING SITES OF URETHRA IN MALE: ICD-10-CM

## 2021-10-18 DIAGNOSIS — I48.0 PAROXYSMAL ATRIAL FIBRILLATION (CMS/HCC): ICD-10-CM

## 2021-10-18 DIAGNOSIS — I10 PRIMARY HYPERTENSION: ICD-10-CM

## 2021-10-18 DIAGNOSIS — Z23 NEED FOR IMMUNIZATION AGAINST INFLUENZA: Primary | ICD-10-CM

## 2021-10-18 DIAGNOSIS — Z00.00 MEDICARE ANNUAL WELLNESS VISIT, SUBSEQUENT: ICD-10-CM

## 2021-10-18 PROBLEM — D15.1 PAPILLARY FIBROELASTOMA: Status: RESOLVED | Noted: 2019-06-19 | Resolved: 2021-10-18

## 2021-10-18 PROBLEM — N35.919 URETHRAL STRICTURE: Status: ACTIVE | Noted: 2021-10-18

## 2021-10-18 PROBLEM — J34.89 LESION OF NOSE: Status: RESOLVED | Noted: 2021-04-22 | Resolved: 2021-10-18

## 2021-10-18 PROCEDURE — 90694 VACC AIIV4 NO PRSRV 0.5ML IM: CPT | Performed by: INTERNAL MEDICINE

## 2021-10-18 PROCEDURE — G0008 ADMIN INFLUENZA VIRUS VAC: HCPCS | Performed by: INTERNAL MEDICINE

## 2021-10-18 PROCEDURE — G0439 PPPS, SUBSEQ VISIT: HCPCS | Performed by: INTERNAL MEDICINE

## 2021-10-18 ASSESSMENT — MINI COG
TOTAL SCORE: 5
COMPLETED: YES

## 2021-10-18 ASSESSMENT — PATIENT HEALTH QUESTIONNAIRE - PHQ9: SUM OF ALL RESPONSES TO PHQ9 QUESTIONS 1 & 2: 0

## 2021-10-18 NOTE — ASSESSMENT & PLAN NOTE
He is in sinus. 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 He was briefly on amiodarone and that has subsequently been weaned off

## 2021-10-18 NOTE — ASSESSMENT & PLAN NOTE
Wife has noted some cyanosis of his lips. I do not appreciate that today. He does note his cough is worse. He has moderate persistent asthma for which she is on Advair and Singulair. He does not have a regular pulmonologist. We will refer to pulmonology to see if they would do anything different in terms of his management

## 2021-10-18 NOTE — ASSESSMENT & PLAN NOTE
Had a posterior urethral stricture develop after TURP. Symptoms improved on finasteride continues follow-up with urology

## 2021-10-18 NOTE — ASSESSMENT & PLAN NOTE
Care team reviewed, health risk assessment reviewed, screening exams and immunizations reviewed, depression screen done, fall risk done, cognitive screen done.

## 2021-10-18 NOTE — PATIENT INSTRUCTIONS
Your Personalized Prevention Plan Services (PPPS)    Preventive Services Checklist (Assumes Average Risk Unless Otherwise Noted):    Health Maintenance Topics with due status: Overdue       Topic Date Due    DTaP, Tdap, and Td Vaccines Never done    Zoster Vaccine Never done    Influenza Vaccine 08/01/2021    Medicare Annual Wellness Visit 10/15/2021     Health Maintenance Topics with due status: Completed       Topic Last Completion Date    Pneumococcal 11/13/2019    Pneumococcal (65 years and older) 11/13/2019    Hepatitis C Screening 10/22/2020    COVID-19 Vaccine 02/23/2021    Annual Falls Risk Screening 10/18/2021     Health Maintenance Topics with due status: Aged Out       Topic Date Due    Meningococcal ACWY Aged Out    HIB Vaccines Aged Out    IPV Vaccines Aged Out    HPV Vaccines Aged Out       You May Be Eligible for These Additional Preventive Services   (Assumes Average Risk Unless Otherwise Noted)  Diabetes Screening Any 1 risk factor: hypertension, dyslipidemia, obesity, high glucose; or Any 2 risk factors: >=64yo, overweight, family history diabetes (covered every 6 months)   Hepatitis C Screening Any 1 risk factor: 1) blood transfusion before 1992,   2) current or past injection drug use (annually for high risk; if born between 09[birth year]2316, see above for status).   Vaccine: Hepatitis B As necessary if at-risk: hemophilia, ESRD, diabetes, living with individual infected with hep B, healthcare worker with frequent contact with blood/bodily fluids (series covered once)   Sexually Transmitted Diseases (STDs) As necessary chlamydia, gonorrhea, syphilis, hepatitis B (covered annually)  HIV if any 1 risk factor present: 1) <14yo or >64yo and at increased risk or 2) 15-64yo and ask for it (covered annually)   Lung Cancer Screening Low dose chest CT if all 3 risk factors: 1) 55-76yo, 2) smoker or quit within last 15y, 3) >=30 pack years (covered annually).  No results found for this or  any previous visit.       Cholesterol Screening No signs or symptoms of cardiovascular disease (screening covered every 5 years).     Prostate Cancer Screening >= 49yo if patient desires test after weighting potential harms and benefits (covered annually)     Abdominal Aortic Aneurysm Screening Any 1 risk factor: 1) family history of AAA or 2) 65-74yo and smoked 100+ cigarettes in a lifetime (covered once).   No results found for this or any previous visit.   No results found for this or any previous visit.           Health Risk Factors with Personalized Education:  ----------------------------------------------------------------------------------------------------------------------  Stress management:  Understanding Your Stress  · Think about how you know when you’re stressed.  · Think about how your thoughts or behaviors are different when you’re stressed.  · Think about what triggers stress for you.  · Think about how you handle stress, and whether you’re making unhealthy choices in response to stress (like smoking, drinking alcohol or overeating).  Managing Stress  · Take a break from the stressful situation.  · Reduce your stress levels by exercising, talking with family/friends, ensuring adequate sleep.  · Consider meditation or yoga.  · Make sure you plan time to do things you enjoy.  · Let your PCP know if you’re having problems limiting your stress.  ----------------------------------------------------------------------------------------------------------------------  Controlling Your Blood Pressure  · Maintain a normal weight (body mass index between 18.5 and 24.9).  · Eat more fruit, vegetables and low-fat dairy.  · Eat less saturated fat and total fat.  · Lower your sodium (salt) intake.  Try to stay under 1500 mg per day, but if you cannot get your intake to be that low, at least lower it by 1000 mg.  · Stay active.  Try to get at least 90 to 150 minutes of exercise per week.  Try brisk walking,  swimming, bicycling or dancing.  · Limit alcohol intake.  When you do consume alcohol, drink no more than 2 drinks per day.  · If you have been prescribed medication, take it regularly and exactly as prescribed.  Let your PCP know if you have any problems or questions about your medication.  · Check your blood pressure at home or at the store.  Write down your readings and share them with your PCP  ----------------------------------------------------------------------------------------------------------------------  Controlling Your Glucose (Sugar) Levels  · Stress can raise your blood sugar.  Learn what causes your stress.  Learn to lower your stress by spending time with family and friends, exercising, deep breathing, trying meditation or yoga, enjoying hobbies and getting enough rest.  Let your PCP know if you’re having problems limiting your stress.  · Maintain a healthy weight by balancing your diet and exercise.  · Choose foods that are lower in calories, saturated fat, trans fat, sugar and salt.  Eat foods with more fiber, like whole grain cereals, bread, crackers, rice or pasta.  Choose to eat fruit, vegetables, and low-fat or fat-free/skim dairy.  · Reduce the portion size of your meals.  Make half of your meal vegetables and fruit, and divide the other half between lean protein and whole grains.  · Drink water instead of juice and regular soda.  · Spend at least some time being active on most days of the week.  · Work to increase your muscle strength at least twice per week.  Try things like light weights, stretch bands, yoga, heavy gardening (digging, planting with tools) or push-ups  ----------------------------------------------------------------------------------------------------------------------  Controlling Your Cholesterol  · Reduce the amount of saturated and trans fat in your diet.  Limit intake of red meat.  Consume only low-fat or non-fat/skim dairy.  Limit fried food.  Cook with vegetable  oils.  · Reduce your intake of sugary foods, sugary drinks and alcohol.  · Eat a diet high in fruit, vegetables and whole grains.  · Get protein from fish, poultry and a small portion of nuts.  · Stay active.  Try to get at least 90 to 150 minutes of exercise per week.  Try brisk walking, swimming, bicycling or dancing.  · Maintain a healthy weight by balancing your diet and exercise.  · If you have been prescribed medication, take it regularly and exactly as prescribed. Let your PCP know if you have any problems or questions about your medication.  · It’s important to know your cholesterol numbers.  When recommended by your PCP, get the cholesterol blood test.  ----------------------------------------------------------------------------------------------------------------------  Reducing Your Risk of Falls  · Tell your PCP if any of your medications make you feel tired, dizzy, lightheaded or off-balance.  · Maintain coordination, flexibility and balance by ensuring regular physical activity.  · Limit alcohol intake to 1 drink per day.  Consider avoiding all alcohol intake.  · Ensure good vision.  Visit an ophthalmologist or optometrist regularly for vision screening or to make sure your glasses / contact lens prescription is correct.  If you need glasses or contacts, wear them.  When you get new glasses or contacts, take time to get used to them.  Do not wear sunglasses or tinted lenses when indoors.  · Ensure good hearing.  Have your hearing checked if you are having trouble hearing, or family and friends think you cannot hear them.  If you need a hearing aid, be sure it fits well and wear it.  · Get enough rest.  Ensure about 7-9 hours of sleep every day.  · Get up slowly from your bed or chairs.  Do not start walking until you are sure you feel steady.  · Wear non-skid, rubber-soled, low-heeled shoes.  Do not walk in socks, or in shoes and slippers with smooth soles.  · If your PCP or therapist recommends using  a cane or walker, use it regularly.  · Make your home safer.  Increase lighting throughout the house, especially at the top and bottom of stairs.  Ensure lighting is easily turned on when getting up in the middle of the night.  Make sure there are two secure rails on all stairs.  Install grab bars in the bathtub / shower and near the toilet.  Consider using a shower chair and / or a hand-held shower.  · Spread sand or salt on icy surfaces.  Beware of wet surfaces, which can be icy.  · Tell your PCP if you have fallen.

## 2021-10-18 NOTE — PROGRESS NOTES
Daily Progress Note      Subjective      Patient ID: Saad Hillman is a 76 y.o. male.  Chief Complaint   Patient presents with   • Medicare Subsequent Annual Wellness Visit   • Asthma     The patient is here for a Medicare Annual Wellness. His wife feels that his asthma is not well controlled and that his lips are purple. He has Cataract surgery in January.     Asthma  His past medical history is significant for asthma.     Follows with urology.  Has posterior urethral stricture.  Doing better than anticipated on finasteride alone.  Procedure has been delayed as long as he continues to do well on the medicine    Had melanoma excised from his nose this summer, had sking graft done--rhealed well    Stated clonazepam for rem disorder  No longer kicking in sleep  Wife sleeping with him again.    The following have been reviewed and updated as appropriate in this visit:       Review of Systems  Patient Active Problem List   Diagnosis   • Asthma   • Seasonal allergies   • BPH (benign prostatic hyperplasia)   • Traumatic amputation of finger   • Arthritis   • Medial meniscus tear   • Papillary fibroelastoma of heart   • Junctional bradycardia   • Paroxysmal atrial fibrillation (CMS/HCC)   • HTN (hypertension)   • Hyperlipemia   • Bilateral hearing loss   • Neuropathy   • Edema   • Prediabetes   • Hematuria   • Elevated PSA   • Tremor   • Bilateral carpal tunnel syndrome   • REM sleep behavior disorder   • Medicare annual wellness visit, subsequent   • Hyponatremia   • Skin lesion of back   • Other dysphagia   • Urethral stricture   • Malignant melanoma of nose (CMS/HCC)     Current Outpatient Medications   Medication Sig Dispense Refill   • albuterol HFA (VENTOLIN HFA) 90 mcg/actuation inhaler Inhale 2 puffs every 6 (six) hours as needed for wheezing. 18 g 1   • clonazePAM (klonoPIN) 0.5 mg tablet      • CYANOCOBALAMIN, VITAMIN B-12, ORAL Take 1,000 mg by mouth daily.     • finasteride (PROSCAR) 5 mg tablet Take 1 tablet  by mouth daily.     • fluticasone propion-salmeteroL (ADVAIR DISKUS) 250-50 mcg/dose diskus inhaler Inhale 1 puff 2 (two) times a day. 180 each 3   • fluticasone propionate (FLONASE) 50 mcg/actuation nasal spray Administer 1 spray into each nostril nightly. 3 Bottle 3   • ketoconazole (NIZORAL) 2 % shampoo      • metoprolol succinate XL (TOPROL-XL) 25 mg 24 hr tablet TAKE 1 TABLET DAILY  (APPOINTMENT PENDING OCTOBER 2021) 90 tablet 1   • montelukast (SINGULAIR) 10 mg tablet TAKE 1 TABLET EVERY NIGHT 90 tablet 3   • rosuvastatin (CRESTOR) 10 mg tablet Take 1 tablet (10 mg total) by mouth nightly. 90 tablet 3   • valsartan-hydrochlorothiazide (DIOVAN-HCT) 80-12.5 mg per tablet Take 1 tablet by mouth daily. 90 tablet 5   • DULoxetine (CYMBALTA) 30 mg capsule Take 30 mg by mouth daily.       No current facility-administered medications for this visit.     Past Medical History:   Diagnosis Date   • Allergies     Seasonal   • Arthritis     b/l shoulders- cortisone injections in 2016   • Asthma    • Atrial myxoma     seen on echo    • BPH (benign prostatic hyperplasia)     was on meds- resolved after TURP in  2017, no meds currently    • Hypertension    • Lipid disorder    • Medial meniscus tear     left knee- had cortisone injection in 2016   • Seasonal allergies    • Sleep disorder     REM sleep disorder per Dr. 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     Family History   Problem Relation Age of Onset   • Abdominal Aortic Aneurysm Biological Mother    • Hyperlipidemia Biological Mother    • Stroke Biological Father      Past Surgical History:   Procedure Laterality Date   • CARDIAC CATHETERIZATION  06/26/2019       • FINGER SURGERY      s/p traumatic amputation- had repaired    • HERNIA REPAIR  2012    left inguinal hernia repair    • LIPOMA RESECTION  1999    right shoulder x 2 and back of neck    • MITRAL VALVE SURGERY  07/09/2019   • MOHS DEFECT REPAIR   2021    Nose   • NASAL POLYP EXCISION  1966- 1999    x 4    • PROSTATE BIOPSY  01/08/2019   • STEROID INJECTION KNEE Bilateral 10/2020   • TONSILLECTOMY  1948   • TRANSURETHRAL RESECTION OF PROSTATE  2017   • VASECTOMY  1980's     Social History     Socioeconomic History   • Marital status:      Spouse name: Umu    • Number of children: 2   • Years of education: Not on file   • Highest education level: Not on file   Occupational History   • Not on file   Tobacco Use   • Smoking status: Never Smoker   • Smokeless tobacco: Never Used   Vaping Use   • Vaping Use: Never used   Substance and Sexual Activity   • Alcohol use: Yes     Comment: occasional    • Drug use: No   • Sexual activity: Defer   Other Topics Concern   • Not on file   Social History Narrative    Retired Draftsman, , 1 son, 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     Social Determinants of Health     Financial Resource Strain: Low Risk    • Difficulty of Paying Living Expenses: Not hard at all   Food Insecurity:    • Worried About Running Out of Food in the Last Year: Not on file   • Ran Out of Food in the Last Year: Not on file   Transportation Needs: No Transportation Needs   • Lack of Transportation (Medical): No   • Lack of Transportation (Non-Medical): No   Physical Activity: Inactive   • Days of Exercise per Week: 0 days   • Minutes of Exercise per Session: 0 min   Stress:    • Feeling of Stress : Not on file   Social Connections: Unknown   • Frequency of Communication with Friends and Family: More than three times a week   • Frequency of Social Gatherings with Friends and Family: Not on file   • Attends Rastafarian Services: Not on file   • Active Member of Clubs or Organizations: Not on file   • Attends Club or Organization Meetings: Not on file   • Marital Status: Not on file   Intimate Partner Violence: Not At Risk   • Fear of Current or Ex-Partner: No   • Emotionally Abused: No   •  Physically Abused: No   • Sexually Abused: No   Housing Stability:    • Unable to Pay for Housing in the Last Year: Not on file   • Number of Places Lived in the Last Year: Not on file   • Unstable Housing in the Last Year: Not on file     Allergies   Allergen Reactions   • Aspirin Other (see comments)     Asthma attack   • Hydrocodone-Acetaminophen GI intolerance     CONSTIPATION     Vitals:    10/18/21 1020   BP: 126/72   Pulse: 74   Resp: 18   Temp: 36.6 °C (97.9 °F)   SpO2: 95%     Objective     Results for orders placed or performed in visit on 04/22/21   Basic metabolic panel   Result Value Ref Range    Glucose 101 65 - 139 mg/dL    BUN 12 7 - 25 mg/dL    Creatinine 0.78 0.70 - 1.18 mg/dL    Egfr Non-Afr. American 86 > OR = 60 mL/min/1.73m2    Egfr  100 > OR = 60 mL/min/1.73m2    Bun/Creatinine Ratio NOT APPLICABLE 6 - 22 (calc)    Sodium 135 135 - 146 mmol/L    Potassium 4.3 3.5 - 5.3 mmol/L    Chloride 100 98 - 110 mmol/L    Carbon Dioxide 26 20 - 32 mmol/L    Calcium 9.7 8.6 - 10.3 mg/dL     Vitals:    10/18/21 1020   BP: 126/72   Pulse: 74   Resp: 18   Temp: 36.6 °C (97.9 °F)   SpO2: 95%     Vital signs reviewed  Physical Exam  Constitutional:       Appearance: He is well-developed.   HENT:      Head: Normocephalic and atraumatic.   Eyes:      Conjunctiva/sclera: Conjunctivae normal.   Cardiovascular:      Rate and Rhythm: Normal rate and regular rhythm.      Heart sounds: Normal heart sounds.   Pulmonary:      Effort: Pulmonary effort is normal. No respiratory distress.      Breath sounds: Normal breath sounds. No wheezing.   Abdominal:      General: Bowel sounds are normal.      Palpations: Abdomen is soft.   Musculoskeletal:         General: No deformity.      Cervical back: Normal range of motion and neck supple.      Right lower leg: No edema.      Left lower leg: No edema.   Skin:     General: Skin is warm and dry.   Neurological:      Mental Status: He is alert and oriented to  person, place, and time.   Psychiatric:         Behavior: Behavior normal.             Chemistry        Component Value Date/Time     04/27/2021 1346     07/15/2019 0507    K 4.3 04/27/2021 1346    K 4.2 07/15/2019 0507     04/27/2021 1346     07/15/2019 0507    CO2 26 04/27/2021 1346    CO2 25 07/15/2019 0507    BUN 12 04/27/2021 1346    BUN 8 07/15/2019 0507    CREATININE 0.78 04/27/2021 1346    CREATININE 0.8 07/15/2019 0507        Component Value Date/Time    CALCIUM 9.7 04/27/2021 1346    CALCIUM 9.4 07/15/2019 0507    ALKPHOS 92 10/22/2020 0914    AST 17 10/22/2020 0914    ALT 16 10/22/2020 0914    BILITOT 0.8 10/22/2020 0914          Lab Results   Component Value Date    HGBA1C 5.5 10/22/2020     Lab Results   Component Value Date    TSH 1.26 11/15/2019     Lab Results   Component Value Date    WBC 6.23 07/15/2019    HGB 11.6 (L) 07/15/2019    HCT 35.3 (L) 07/15/2019    MCV 90.3 07/15/2019     07/15/2019       Lab Results   Component Value Date    LDLCALC 87 10/22/2020       Assessment and Plan   Paroxysmal atrial fibrillation (CMS/HCC)  He is in sinus. 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 He was briefly on amiodarone and that has subsequently been weaned off    Asthma  Wife has noted some cyanosis of his lips. I do not appreciate that today. He does note his cough is worse. He has moderate persistent asthma for which she is on Advair and Singulair. He does not have a regular pulmonologist. We will refer to pulmonology to see if they would do anything different in terms of his management    Hyperlipemia  Continue Crestor    HTN (hypertension)  Blood pressure well controlled continue valsartan hydrochlorothiazide    Urethral stricture  Had a posterior urethral stricture develop after TURP. Symptoms improved on finasteride continues follow-up with urology    Malignant melanoma of nose (CMS/HCC)  Status post excision and skin  graft nose    Medicare annual wellness visit, subsequent  Care team reviewed, health risk assessment reviewed, screening exams and immunizations reviewed, depression screen done, fall risk done, cognitive screen done.      Orders Placed This Encounter   Procedures   • Influenza vaccine Quad Adjuvanted 65 and Older (FluAd Quad)   • Lipid Prof w Refl     Standing Status:   Future     Number of Occurrences:   1     Standing Expiration Date:   10/18/2022     Order Specific Question:   Release to patient     Answer:   Immediate   • Hemoglobin A1c     Standing Status:   Future     Number of Occurrences:   1     Standing Expiration Date:   10/18/2022     Order Specific Question:   Release to patient     Answer:   Immediate   • Comprehensive metabolic panel     Standing Status:   Future     Number of Occurrences:   1     Standing Expiration Date:   10/18/2022     Order Specific Question:   Release to patient     Answer:   Immediate   • Ambulatory referral to Pulmonology     Standing Status:   Future     Standing Expiration Date:   4/18/2022     Referral Priority:   Routine     Referral Type:   Consultation     Referral Reason:   Specialty Services Required     Referred to Provider:   Guille Linda, DO     Requested Specialty:   Pulmonary Disease     Number of Visits Requested:   10         This note was created using speech recognition software. Any errors are unintentional. If you have any questions or need clarification please call.  Sukhjinder Galvez MD  10/18/2021

## 2021-11-03 ENCOUNTER — HOSPITAL ENCOUNTER (OUTPATIENT)
Dept: RADIOLOGY | Age: 76
Discharge: HOME | End: 2021-11-03
Attending: NURSE PRACTITIONER
Payer: COMMERCIAL

## 2021-11-03 ENCOUNTER — TRANSCRIBE ORDERS (OUTPATIENT)
Dept: RADIOLOGY | Age: 76
End: 2021-11-03
Payer: COMMERCIAL

## 2021-11-03 DIAGNOSIS — R05.9 COUGH, UNSPECIFIED: ICD-10-CM

## 2021-11-03 DIAGNOSIS — R05.9 COUGH, UNSPECIFIED: Primary | ICD-10-CM

## 2021-11-03 LAB
ALBUMIN SERPL-MCNC: 4.1 G/DL (ref 3.6–5.1)
ALBUMIN/GLOB SERPL: 1.8 (CALC) (ref 1–2.5)
ALP SERPL-CCNC: 78 U/L (ref 35–144)
ALT SERPL-CCNC: 20 U/L (ref 9–46)
AST SERPL-CCNC: 21 U/L (ref 10–35)
BILIRUB SERPL-MCNC: 0.8 MG/DL (ref 0.2–1.2)
BUN SERPL-MCNC: 12 MG/DL (ref 7–25)
BUN/CREAT SERPL: NORMAL (CALC) (ref 6–22)
CALCIUM SERPL-MCNC: 9.9 MG/DL (ref 8.6–10.3)
CHLORIDE SERPL-SCNC: 101 MMOL/L (ref 98–110)
CHOLEST SERPL-MCNC: 164 MG/DL
CHOLEST/HDLC SERPL: 2.8 (CALC)
CO2 SERPL-SCNC: 29 MMOL/L (ref 20–32)
CREAT SERPL-MCNC: 0.84 MG/DL (ref 0.7–1.18)
GLOBULIN SER CALC-MCNC: 2.3 G/DL (CALC) (ref 1.9–3.7)
GLUCOSE SERPL-MCNC: 92 MG/DL (ref 65–99)
HBA1C MFR BLD: 5.6 % OF TOTAL HGB
HDLC SERPL-MCNC: 58 MG/DL
LDLC SERPL CALC-MCNC: 88 MG/DL (CALC)
NONHDLC SERPL-MCNC: 106 MG/DL (CALC)
POTASSIUM SERPL-SCNC: 4 MMOL/L (ref 3.5–5.3)
PROT SERPL-MCNC: 6.4 G/DL (ref 6.1–8.1)
QUEST EGFR AFRICAN AMERICAN: 97 ML/MIN/1.73M2
QUEST EGFR NON-AFR. AMERICAN: 84 ML/MIN/1.73M2
SODIUM SERPL-SCNC: 136 MMOL/L (ref 135–146)
TRIGL SERPL-MCNC: 86 MG/DL

## 2021-11-03 PROCEDURE — 71046 X-RAY EXAM CHEST 2 VIEWS: CPT

## 2021-11-04 RX ORDER — ROSUVASTATIN CALCIUM 10 MG/1
TABLET, COATED ORAL
Qty: 90 TABLET | Refills: 3 | Status: SHIPPED | OUTPATIENT
Start: 2021-11-04 | End: 2022-05-25

## 2021-11-05 ENCOUNTER — TELEPHONE (OUTPATIENT)
Dept: PRIMARY CARE | Facility: CLINIC | Age: 76
End: 2021-11-05

## 2021-11-05 NOTE — TELEPHONE ENCOUNTER
----- Message from Sukhjinder Galvez MD sent at 11/4/2021  8:22 PM EDT -----  Blood sugar metabolic profile and cholesterol were normal  Spoke with patient's wife.

## 2021-11-29 ENCOUNTER — IMMUNIZATION (OUTPATIENT)
Dept: IMMUNIZATION | Facility: CLINIC | Age: 76
End: 2021-11-29
Attending: FAMILY MEDICINE
Payer: COMMERCIAL

## 2021-11-29 PROCEDURE — 91306 SARS-COV-2 VACCINE BOOSTER MODERNA: CPT | Performed by: FAMILY MEDICINE

## 2021-11-29 PROCEDURE — 0064A SARS-COV-2 VACCINE BOOSTER MODERNA: CPT | Performed by: FAMILY MEDICINE

## 2021-12-20 RX ORDER — MONTELUKAST SODIUM 10 MG/1
TABLET ORAL
Qty: 90 TABLET | Refills: 3 | Status: SHIPPED | OUTPATIENT
Start: 2021-12-20 | End: 2021-12-23 | Stop reason: SDUPTHER

## 2021-12-23 ENCOUNTER — CONSULT (OUTPATIENT)
Dept: PRIMARY CARE | Facility: CLINIC | Age: 76
End: 2021-12-23
Payer: COMMERCIAL

## 2021-12-23 VITALS
OXYGEN SATURATION: 96 % | RESPIRATION RATE: 16 BRPM | WEIGHT: 190 LBS | HEIGHT: 69 IN | DIASTOLIC BLOOD PRESSURE: 66 MMHG | BODY MASS INDEX: 28.14 KG/M2 | SYSTOLIC BLOOD PRESSURE: 112 MMHG | TEMPERATURE: 96.2 F | HEART RATE: 88 BPM

## 2021-12-23 DIAGNOSIS — I10 PRIMARY HYPERTENSION: ICD-10-CM

## 2021-12-23 DIAGNOSIS — G47.52 REM SLEEP BEHAVIOR DISORDER: ICD-10-CM

## 2021-12-23 DIAGNOSIS — I48.0 PAROXYSMAL ATRIAL FIBRILLATION (CMS/HCC): ICD-10-CM

## 2021-12-23 DIAGNOSIS — G56.22 ULNAR NEUROPATHY AT WRIST, LEFT: Primary | ICD-10-CM

## 2021-12-23 DIAGNOSIS — J45.40 MODERATE PERSISTENT ASTHMA, UNSPECIFIED WHETHER COMPLICATED: ICD-10-CM

## 2021-12-23 PROCEDURE — 99214 OFFICE O/P EST MOD 30 MIN: CPT | Performed by: INTERNAL MEDICINE

## 2021-12-23 PROCEDURE — 3008F BODY MASS INDEX DOCD: CPT | Performed by: INTERNAL MEDICINE

## 2021-12-23 RX ORDER — FLUTICASONE PROPIONATE 50 MCG
1 SPRAY, SUSPENSION (ML) NASAL NIGHTLY
Qty: 32 G | Refills: 3 | Status: SHIPPED | OUTPATIENT
Start: 2021-12-23 | End: 2022-10-19 | Stop reason: SDUPTHER

## 2021-12-23 RX ORDER — MONTELUKAST SODIUM 10 MG/1
TABLET ORAL
Qty: 90 TABLET | Refills: 3 | Status: SHIPPED | OUTPATIENT
Start: 2021-12-23 | End: 2022-12-12

## 2021-12-23 NOTE — PROGRESS NOTES
Daily Progress Note      Subjective      Patient ID: Saad Hillman is a 76 y.o. male.  Chief Complaint   Patient presents with   • Pre-op Exam     The patient is here for a preoperative clearance for Phaco with IOL bilaterally with Dr. Michele Burgess at [facility] on 01/06/2022 and 01/20/2022. He states that he has a decreased strength in his left little finger.    He saw Dr Linda since I saw him last. Advair increased to bid. 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  Having trouble extending and flexing the finger.  Is deftly different than the other side he does not have any numbness of that finger.      The following have been reviewed and updated as appropriate in this visit:        Review of Systems  Patient Active Problem List   Diagnosis   • Asthma   • Seasonal allergies   • BPH (benign prostatic hyperplasia)   • Traumatic amputation of finger   • Arthritis   • Medial meniscus tear   • Papillary fibroelastoma of heart   • Junctional bradycardia   • Paroxysmal atrial fibrillation (CMS/HCC)   • HTN (hypertension)   • Hyperlipemia   • Bilateral hearing loss   • Neuropathy   • Edema   • Prediabetes   • Hematuria   • Elevated PSA   • Tremor   • Bilateral carpal tunnel syndrome   • REM sleep behavior disorder   • Medicare annual wellness visit, subsequent   • Hyponatremia   • Skin lesion of back   • Other dysphagia   • Urethral stricture   • Malignant melanoma of nose (CMS/HCC)   • Ulnar neuropathy at wrist, left     Current Outpatient Medications   Medication Sig Dispense Refill   • clonazePAM (klonoPIN) 0.5 mg tablet      • CYANOCOBALAMIN, VITAMIN B-12, ORAL Take 1,000 mg by mouth daily.     • finasteride (PROSCAR) 5 mg tablet Take 1 tablet by mouth daily.     • fluticasone propion-salmeteroL (ADVAIR  DISKUS) 250-50 mcg/dose diskus inhaler Inhale 1 puff 2 (two) times a day. 180 each 3   • fluticasone propionate 50 mcg/actuation nasal spray ADMINISTER 1 SPRAY INTO EACH NOSTRIL NIGHTLY. 32 g 3   • ketoconazole (NIZORAL) 2 % shampoo      • metoprolol succinate XL (TOPROL-XL) 25 mg 24 hr tablet TAKE 1 TABLET DAILY  (APPOINTMENT PENDING OCTOBER 2021) 90 tablet 1   • montelukast 10 mg tablet TAKE 1 TABLET EVERY NIGHT 90 tablet 3   • rosuvastatin 10 mg tablet TAKE 1 TABLET  NIGHTLY 90 tablet 3   • valsartan-hydrochlorothiazide (DIOVAN-HCT) 80-12.5 mg per tablet Take 1 tablet by mouth daily. 90 tablet 5   • albuterol HFA (VENTOLIN HFA) 90 mcg/actuation inhaler Inhale 2 puffs every 6 (six) hours as needed for wheezing. (Patient not taking: Reported on 12/23/2021 ) 18 g 1   • DULoxetine (CYMBALTA) 30 mg capsule Take 30 mg by mouth daily.       No current facility-administered medications for this visit.     Past Medical History:   Diagnosis Date   • Allergies     Seasonal   • Arthritis     b/l shoulders- cortisone injections in 2016   • Asthma    • Atrial myxoma     seen on echo    • BPH (benign prostatic hyperplasia)     was on meds- resolved after TURP in  2017, no meds currently    • Hypertension    • Lipid disorder    • Medial meniscus tear     left knee- had cortisone injection in 2016   • Seasonal allergies    • Sleep disorder     REM sleep disorder per Dr. 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     Family History   Problem Relation Age of Onset   • Abdominal Aortic Aneurysm Biological Mother    • Hyperlipidemia Biological Mother    • Stroke Biological Father      Past Surgical History:   Procedure Laterality Date   • CARDIAC CATHETERIZATION  06/26/2019    [facility]   • FINGER SURGERY      s/p traumatic amputation- had repaired    • HERNIA REPAIR  2012    left inguinal hernia repair    • LIPOMA RESECTION  1999    right shoulder x 2 and back of neck    • MITRAL  VALVE SURGERY  07/09/2019   • MOHS DEFECT REPAIR  2021    Nose   • NASAL POLYP EXCISION  1966- 1999    x 4    • PROSTATE BIOPSY  01/08/2019   • STEROID INJECTION KNEE Bilateral 10/2020   • TONSILLECTOMY  1948   • TRANSURETHRAL RESECTION OF PROSTATE  2017   • VASECTOMY  1980's     Social History     Socioeconomic History   • Marital status:      Spouse name: Umu    • Number of children: 2   • Years of education: Not on file   • Highest education level: Not on file   Occupational History   • Not on file   Tobacco Use   • Smoking status: Never Smoker   • Smokeless tobacco: Never Used   Vaping Use   • Vaping Use: Never used   Substance and Sexual Activity   • Alcohol use: Yes     Comment: occasional    • Drug use: No   • Sexual activity: Defer   Other Topics Concern   • Not on file   Social History Narrative    Retired Draftsman, , 1 son, 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     Social Determinants of Health     Financial Resource Strain: Low Risk    • Difficulty of Paying Living Expenses: Not hard at all   Food Insecurity: Not on file   Transportation Needs: No Transportation Needs   • Lack of Transportation (Medical): No   • Lack of Transportation (Non-Medical): No   Physical Activity: Inactive   • Days of Exercise per Week: 0 days   • Minutes of Exercise per Session: 0 min   Stress: Not on file   Social Connections: Unknown   • Frequency of Communication with Friends and Family: More than three times a week   • Frequency of Social Gatherings with Friends and Family: Not on file   • Attends [employer] Services: Not on file   • Active Member of Clubs or Organizations: Not on file   • Attends Club or Organization Meetings: Not on file   • Marital Status: Not on file   Intimate Partner Violence: Not At Risk   • Fear of Current or Ex-Partner: No   • Emotionally Abused: No   • Physically Abused: No   • Sexually Abused: No   Housing Stability: Not on file      Allergies   Allergen Reactions   • Aspirin Other (see comments)     Asthma attack   • Hydrocodone-Acetaminophen GI intolerance     CONSTIPATION     Vitals:    12/23/21 0946   BP: 112/66   Pulse: 88   Resp: 16   Temp: (!) 35.7 °C (96.2 °F)   SpO2: 96%     Objective     Results for orders placed or performed in visit on 10/18/21   Lipid Prof w Refl   Result Value Ref Range    Cholesterol, Total 164 <200 mg/dL    Hdl Cholesterol 58 > OR = 40 mg/dL    Triglycerides 86 <150 mg/dL    Ldl-Cholesterol 88 mg/dL (calc)    Chol/Hdlc Ratio 2.8 <5.0 (calc)    Non Hdl Cholesterol 106 <130 mg/dL (calc)   Hemoglobin A1c   Result Value Ref Range    Hemoglobin A1C 5.6 <5.7 % of total Hgb   Comprehensive metabolic panel   Result Value Ref Range    Glucose 92 65 - 99 mg/dL    BUN 12 7 - 25 mg/dL    Creatinine 0.84 0.70 - 1.18 mg/dL    Egfr Non-Afr. American 84 > OR = 60 mL/min/1.73m2    Egfr  97 > OR = 60 mL/min/1.73m2    Bun/Creatinine Ratio NOT APPLICABLE 6 - 22 (calc)    Sodium 136 135 - 146 mmol/L    Potassium 4.0 3.5 - 5.3 mmol/L    Chloride 101 98 - 110 mmol/L    Carbon Dioxide 29 20 - 32 mmol/L    Calcium 9.9 8.6 - 10.3 mg/dL    Protein, Total 6.4 6.1 - 8.1 g/dL    Albumin 4.1 3.6 - 5.1 g/dL    Globulin 2.3 1.9 - 3.7 g/dL (calc)    Albumin/Globulin Ratio 1.8 1.0 - 2.5 (calc)    Bilirubin, Total 0.8 0.2 - 1.2 mg/dL    Alkaline Phosphatase 78 35 - 144 U/L    Ast 21 10 - 35 U/L    Alt 20 9 - 46 U/L     Vitals:    12/23/21 0946   BP: 112/66   Pulse: 88   Resp: 16   Temp: (!) 35.7 °C (96.2 °F)   SpO2: 96%     Vital signs reviewed  Physical Exam  Constitutional:       Appearance: He is well-developed.   HENT:      Head: Normocephalic and atraumatic.   Eyes:      Conjunctiva/sclera: Conjunctivae normal.   Cardiovascular:      Rate and Rhythm: Normal rate and regular rhythm.      Heart sounds: Normal heart sounds.   Pulmonary:      Effort: Pulmonary effort is normal. No respiratory distress.      Breath sounds:  Normal breath sounds. No wheezing.   Abdominal:      General: Bowel sounds are normal.      Palpations: Abdomen is soft.   Musculoskeletal:         General: No deformity.      Left hand: Decreased strength.      Cervical back: Normal range of motion and neck supple.      Right lower leg: No edema.      Left lower leg: No edema.      Comments: There is weakness of the left fifth finger both with flexion and extension there is interossei muscles.  There does seem to be some subtle atrophy of the hyperthenar eminence compared to the other side.   Skin:     General: Skin is warm and dry.   Neurological:      Mental Status: He is alert and oriented to person, place, and time.   Psychiatric:         Behavior: Behavior normal.             Chemistry        Component Value Date/Time     11/03/2021 0838     07/15/2019 0507    K 4.0 11/03/2021 0838    K 4.2 07/15/2019 0507     11/03/2021 0838     07/15/2019 0507    CO2 29 11/03/2021 0838    CO2 25 07/15/2019 0507    BUN 12 11/03/2021 0838    BUN 8 07/15/2019 0507    CREATININE 0.84 11/03/2021 0838    CREATININE 0.8 07/15/2019 0507        Component Value Date/Time    CALCIUM 9.9 11/03/2021 0838    CALCIUM 9.4 07/15/2019 0507    ALKPHOS 78 11/03/2021 0838    AST 21 11/03/2021 0838    ALT 20 11/03/2021 0838    BILITOT 0.8 11/03/2021 0838          Lab Results   Component Value Date    HGBA1C 5.6 11/03/2021     Lab Results   Component Value Date    TSH 1.26 11/15/2019     Lab Results   Component Value Date    WBC 6.23 07/15/2019    HGB 11.6 (L) 07/15/2019    HCT 35.3 (L) 07/15/2019    MCV 90.3 07/15/2019     07/15/2019       Lab Results   Component Value Date    LDLCALC 88 11/03/2021       Assessment and Plan   Paroxysmal atrial fibrillation (CMS/HCC)  Occurred after surgery to remove the 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  Is not on any coag elation.    Ulnar neuropathy at wrist, left  Presents with weakness of the left small  finger.  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  Has weakness of the interossei muscles flexion and extension of the pinky.  Suspect ulnar nerve injury will refer for EMG.  His wife has seen Dr. Marcus in the past diagnosis is confirmed we will refer him there as well    HTN (hypertension)  Blood pressure well controlled continue Toprol-XL, valsartan hydrochlorothiazide    REM sleep behavior disorder  Doing well on clonazepam    Asthma  Moderate intermittent asthma who recently saw pulmonology they encouraged him to use his Advair twice daily.  Has been doing so with great response      Orders Placed This Encounter   Procedures   • EMG     Weakness and atrophy of musculature of left 5th finger     Standing Status:   Future     Standing Expiration Date:   12/23/2022     Order Specific Question:   Type:     Answer:   Upper extremity     Order Specific Question:   Laterality     Answer:   Left     Order Specific Question:   Reason for study     Answer:   Neuropathy     Order Specific Question:   Release to patient     Answer:   Immediate     Preoperative forms filled out for his cataract surgery there is no contraindication to proceeding with surgery    This note was created using speech recognition software. Any errors are unintentional. If you have any questions or need clarification please call.  Sukhjinder Galvez MD  12/23/2021

## 2021-12-23 NOTE — ASSESSMENT & PLAN NOTE
Moderate intermittent asthma who recently saw pulmonology they encouraged him to use his Advair twice daily.  Has been doing so with great response

## 2021-12-23 NOTE — ASSESSMENT & PLAN NOTE
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  Is not on any coag elation.

## 2021-12-23 NOTE — ASSESSMENT & PLAN NOTE
Presents with weakness of the left small finger.  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  Has weakness of the interossei muscles flexion and extension of the pinky.  Suspect ulnar nerve injury will refer for EMG.  His wife has seen Dr. Marcus in the past diagnosis is confirmed we will refer him there as well

## 2022-01-11 ENCOUNTER — TRANSCRIBE ORDERS (OUTPATIENT)
Dept: SCHEDULING | Age: 77
End: 2022-01-11

## 2022-01-11 DIAGNOSIS — S56.119A: Primary | ICD-10-CM

## 2022-01-18 RX ORDER — CLONAZEPAM 0.5 MG/1
0.5 TABLET ORAL DAILY
Qty: 30 TABLET | Refills: 0 | Status: SHIPPED | OUTPATIENT
Start: 2022-01-18

## 2022-01-18 NOTE — TELEPHONE ENCOUNTER
Pt was seeing Dr. Orellana neurology /[facility] who prescribed his clonazepam 0.5 mg 1 tab daily. He usually gets #90 and it gets sent to [facility] mail order. Per pt Dr. Orellana's office closed and no additional info is on any telephone recording at his old number. Would Dr. Galvez order this med for pt? If any concern let pt know- his # is [phone]

## 2022-02-08 ENCOUNTER — HOSPITAL ENCOUNTER (OUTPATIENT)
Dept: RADIOLOGY | Facility: CLINIC | Age: 77
Discharge: HOME | End: 2022-02-08
Attending: ORTHOPAEDIC SURGERY
Payer: COMMERCIAL

## 2022-02-08 DIAGNOSIS — S56.119A: ICD-10-CM

## 2022-02-08 PROCEDURE — 76882 US LMTD JT/FCL EVL NVASC XTR: CPT | Mod: LT

## 2022-02-21 DIAGNOSIS — I10 ESSENTIAL HYPERTENSION: ICD-10-CM

## 2022-02-21 RX ORDER — METOPROLOL SUCCINATE 25 MG/1
TABLET, EXTENDED RELEASE ORAL
Qty: 90 TABLET | Refills: 1 | Status: SHIPPED | OUTPATIENT
Start: 2022-02-21 | End: 2022-07-18

## 2022-03-03 ENCOUNTER — CONSULT (OUTPATIENT)
Dept: PRIMARY CARE | Facility: CLINIC | Age: 77
End: 2022-03-03
Payer: COMMERCIAL

## 2022-03-03 VITALS
BODY MASS INDEX: 28.14 KG/M2 | OXYGEN SATURATION: 93 % | RESPIRATION RATE: 16 BRPM | HEART RATE: 82 BPM | WEIGHT: 190 LBS | DIASTOLIC BLOOD PRESSURE: 80 MMHG | HEIGHT: 69 IN | SYSTOLIC BLOOD PRESSURE: 126 MMHG

## 2022-03-03 DIAGNOSIS — G56.22 ULNAR NEUROPATHY AT WRIST, LEFT: ICD-10-CM

## 2022-03-03 DIAGNOSIS — I48.0 PAROXYSMAL ATRIAL FIBRILLATION (CMS/HCC): ICD-10-CM

## 2022-03-03 DIAGNOSIS — Z01.818 PREOP GENERAL PHYSICAL EXAM: Primary | ICD-10-CM

## 2022-03-03 DIAGNOSIS — C43.62 MALIGNANT MELANOMA OF LEFT UPPER ARM (CMS/HCC): ICD-10-CM

## 2022-03-03 DIAGNOSIS — I10 PRIMARY HYPERTENSION: ICD-10-CM

## 2022-03-03 DIAGNOSIS — C43.31 MALIGNANT MELANOMA OF NOSE (CMS/HCC): ICD-10-CM

## 2022-03-03 DIAGNOSIS — G56.03 BILATERAL CARPAL TUNNEL SYNDROME: ICD-10-CM

## 2022-03-03 DIAGNOSIS — J45.40 MODERATE PERSISTENT ASTHMA, UNSPECIFIED WHETHER COMPLICATED: ICD-10-CM

## 2022-03-03 PROBLEM — J33.9 NASAL POLYP: Status: ACTIVE | Noted: 2022-03-03

## 2022-03-03 PROBLEM — R13.19 OTHER DYSPHAGIA: Status: RESOLVED | Noted: 2021-04-22 | Resolved: 2022-03-03

## 2022-03-03 PROBLEM — R00.1 JUNCTIONAL BRADYCARDIA: Status: RESOLVED | Noted: 2019-07-10 | Resolved: 2022-03-03

## 2022-03-03 PROCEDURE — 3008F BODY MASS INDEX DOCD: CPT | Performed by: NURSE PRACTITIONER

## 2022-03-03 PROCEDURE — 93000 ELECTROCARDIOGRAM COMPLETE: CPT | Performed by: NURSE PRACTITIONER

## 2022-03-03 PROCEDURE — 99214 OFFICE O/P EST MOD 30 MIN: CPT | Performed by: NURSE PRACTITIONER

## 2022-03-03 ASSESSMENT — ENCOUNTER SYMPTOMS
WEAKNESS: 0
CHEST TIGHTNESS: 0
SHORTNESS OF BREATH: 0
CHILLS: 0
APPETITE CHANGE: 0
DIAPHORESIS: 0
ADENOPATHY: 0
TROUBLE SWALLOWING: 0
BRUISES/BLEEDS EASILY: 0
ARTHRALGIAS: 1
PALPITATIONS: 0
WOUND: 1
WHEEZING: 0
APNEA: 0
FATIGUE: 0
SPEECH DIFFICULTY: 0
UNEXPECTED WEIGHT CHANGE: 0
VOICE CHANGE: 0
PSYCHIATRIC NEGATIVE: 1
FEVER: 0
TREMORS: 0
NUMBNESS: 1
GASTROINTESTINAL NEGATIVE: 1
LIGHT-HEADEDNESS: 0

## 2022-03-03 NOTE — ASSESSMENT & PLAN NOTE
To have carpal tunnel surgery ulnar left wrist   Dr Franklin on  3/11/2022     Acceptable risk for planned procedure BP controlled  And asthma controlled   Recent labs glucose and renal function good no activity intolerance or wheezing on Advair   Medications reconciled   EKG  SR  HR   85  Non specific  QRS wide  . 117 no acute ischemia  Compared to EKG    July 12 2019    Now in SR   No  ST abnormalities    Reviewed EKG with Dr Galvez and chart  Forms  And EKG cosigned and faxed to surgeon   Pt to get covid test per surgeon   No NSAID or ASA or herbal meds OTC  7 d prior to surgery

## 2022-03-03 NOTE — ASSESSMENT & PLAN NOTE
Moderate intermittent  Following with pulmonary Dr Linda on Advair and Singulair  has been stable

## 2022-03-03 NOTE — PATIENT INSTRUCTIONS
Asthma  Moderate intermittent  Following with pulmonary Dr Linda on Advair and Singulair  has been stable        Paroxysmal atrial fibrillation (CMS/HCC)  History of with post op period no re occurrence    No anticoagulants      HTN (hypertension)  BP controlled    Continue  Metoprolol succinate XL   25 mg  Daily and    Valsartan  Hydrochlorothiazide  80  12.5 mg daily     Malignant melanoma of nose (CMS/HCC)  Had MOH's surgery past    FU with derm yearly      Preop general physical exam  To have carpal tunnel surgery ulnar left wrist   Dr Franklin on  3/11/2022     Acceptable risk for planned procedure BP controlled  And asthma controlled   Recent labs glucose and renal function good no activity intolerance or wheezing on Advair   Medications reconciled   EKG  SR  HR   85  Non specific  QRS wide  . 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

## 2022-03-03 NOTE — ASSESSMENT & PLAN NOTE
BP controlled    Continue  Metoprolol succinate XL   25 mg  Daily and    Valsartan  Hydrochlorothiazide  80  12.5 mg daily

## 2022-03-03 NOTE — PROGRESS NOTES
Daily Progress Note      Subjective      Patient ID: Saad Hillman is a 76 y.o. male 1945  No chief complaint on file.    Pt here for preoperative evaluation for: CTS repair left    Date of surgery: 3/11/2022    Surgeon: Dr Franklin      Pt has had left 5th finger tendon and thenar atrophy numbness  No PT   Worsening sx    Has sutures in left upper arm  From MOH's surgery last week      PMH  Asthma  Bradycardia  PAF  Mitral valve surgery  Papillary fibroelastoma of heart  HRN  BPH dysphasia HDL  Prediabetes  Hyponatremia melanoma of nose    Sleep disorder     Pt had covid in Nov 2021  No sequela   Sleeping okay except for neuropathy wake up    Asthma is controlled improved since Advair increased,  Not needing emergency albuterol   Able to walk up stairs without dyspnea  No palpations no syncope  Or activity intolerance or edema      Labs 11/3/2021  A1c was  5.6  CMP WNL lipids good    And  2/23/2022   Glucose   86  Cr  0.80   CBC WNL       Denies loose teeth dentures , no bleeding problems , no problems with anesthesia   No difficulty lying supine no Reflux   Patient with normal functional capacity MET level 4 -10  In normal state of health Pt has normal cognitive function and understands risk of procedure.  Pt has social support for post op period.                 The following have been reviewed and updated as appropriate in this visit:        Review of Systems   Constitutional: Negative for appetite change, chills, diaphoresis, fatigue, fever and unexpected weight change.   HENT: Negative for hearing loss, trouble swallowing and voice change.    Eyes: Negative for visual disturbance.   Respiratory: Negative for apnea, chest tightness, shortness of breath and wheezing.    Cardiovascular: Negative for chest pain, palpitations and leg swelling.   Gastrointestinal: Negative.    Musculoskeletal: Positive for arthralgias.        Hand pain     Skin: Positive for wound (Left upper arm  ). Negative for rash.    Neurological: Positive for numbness. Negative for tremors, syncope, speech difficulty, weakness and light-headedness.   Hematological: Negative for adenopathy. Does not bruise/bleed easily.   Psychiatric/Behavioral: Negative.      Patient Active Problem List   Diagnosis   • Asthma   • Seasonal allergies   • BPH (benign prostatic hyperplasia)   • Traumatic amputation of finger   • Arthritis   • Medial meniscus tear   • Papillary fibroelastoma of heart   • Junctional bradycardia   • Paroxysmal atrial fibrillation (CMS/HCC)   • HTN (hypertension)   • Hyperlipemia   • Bilateral hearing loss   • Neuropathy   • Edema   • Prediabetes   • Hematuria   • Elevated PSA   • Tremor   • Bilateral carpal tunnel syndrome   • REM sleep behavior disorder   • Medicare annual wellness visit, subsequent   • Hyponatremia   • Skin lesion of back   • Other dysphagia   • Urethral stricture   • Malignant melanoma of nose (CMS/HCC)   • Ulnar neuropathy at wrist, left     Current Outpatient Medications   Medication Sig Dispense Refill   • albuterol HFA (VENTOLIN HFA) 90 mcg/actuation inhaler Inhale 2 puffs every 6 (six) hours as needed for wheezing. (Patient not taking: Reported on 12/23/2021 ) 18 g 1   • clonazePAM 0.5 mg tablet Take 1 tablet (0.5 mg total) by mouth daily. 30 tablet 0   • CYANOCOBALAMIN, VITAMIN B-12, ORAL Take 1,000 mg by mouth daily.     • DULoxetine (CYMBALTA) 30 mg capsule Take 30 mg by mouth daily.     • finasteride (PROSCAR) 5 mg tablet Take 1 tablet by mouth daily.     • fluticasone propion-salmeteroL (ADVAIR DISKUS) 250-50 mcg/dose diskus inhaler Inhale 1 puff 2 (two) times a day. 180 each 3   • fluticasone propionate 50 mcg/actuation nasal spray ADMINISTER 1 SPRAY INTO EACH NOSTRIL NIGHTLY. 32 g 3   • ketoconazole (NIZORAL) 2 % shampoo      • metoprolol succinate XL (TOPROL-XL) 25 mg 24 hr tablet TAKE 1 TABLET DAILY  (APPOINTMENT PENDING OCTOBER 2021) 90 tablet 1   • montelukast 10 mg tablet TAKE 1 TABLET EVERY  NIGHT 90 tablet 3   • rosuvastatin 10 mg tablet TAKE 1 TABLET  NIGHTLY 90 tablet 3   • valsartan-hydrochlorothiazide (DIOVAN-HCT) 80-12.5 mg per tablet Take 1 tablet by mouth daily. 90 tablet 5     No current facility-administered medications for this visit.     Past Medical History:   Diagnosis Date   • Allergies     Seasonal   • Arthritis     b/l shoulders- cortisone injections in 2016   • Asthma    • Atrial myxoma     seen on echo    • BPH (benign prostatic hyperplasia)     was on meds- resolved after TURP in  2017, no meds currently    • Hypertension    • Lipid disorder    • Medial meniscus tear     left knee- had cortisone injection in 2016   • Seasonal allergies    • Sleep disorder     REM sleep disorder per Dr. 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     Family History   Problem Relation Age of Onset   • Abdominal Aortic Aneurysm Biological Mother    • Hyperlipidemia Biological Mother    • Stroke Biological Father      Past Surgical History:   Procedure Laterality Date   • CARDIAC CATHETERIZATION  06/26/2019    [facility]   • FINGER SURGERY      s/p traumatic amputation- had repaired    • HERNIA REPAIR  2012    left inguinal hernia repair    • LIPOMA RESECTION  1999    right shoulder x 2 and back of neck    • MITRAL VALVE SURGERY  07/09/2019   • MOHS DEFECT REPAIR  2021    Nose   • NASAL POLYP EXCISION  1966- 1999    x 4    • PROSTATE BIOPSY  01/08/2019   • STEROID INJECTION KNEE Bilateral 10/2020   • TONSILLECTOMY  1948   • TRANSURETHRAL RESECTION OF PROSTATE  2017   • VASECTOMY  1980's     Social History     Socioeconomic History   • Marital status:      Spouse name: Umu    • Number of children: 2   • Years of education: Not on file   • Highest education level: Not on file   Occupational History   • Not on file   Tobacco Use   • Smoking status: Never Smoker   • Smokeless tobacco: Never Used   Vaping Use   • Vaping Use: Never used   Substance  and Sexual Activity   • Alcohol use: Yes     Comment: occasional    • Drug use: No   • Sexual activity: Defer   Other Topics Concern   • Not on file   Social History Narrative    Retired Draftsman, , 1 son, 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     Social Determinants of Health     Financial Resource Strain: Low Risk    • Difficulty of Paying Living Expenses: Not hard at all   Food Insecurity: Not on file   Transportation Needs: No Transportation Needs   • Lack of Transportation (Medical): No   • Lack of Transportation (Non-Medical): No   Physical Activity: Inactive   • Days of Exercise per Week: 0 days   • Minutes of Exercise per Session: 0 min   Stress: Not on file   Social Connections: Unknown   • Frequency of Communication with Friends and Family: More than three times a week   • Frequency of Social Gatherings with Friends and Family: Not on file   • Attends Je[employer] witness Services: Not on file   • Active Member of Clubs or Organizations: Not on file   • Attends Club or Organization Meetings: Not on file   • Marital Status: Not on file   Intimate Partner Violence: Not At Risk   • Fear of Current or Ex-Partner: No   • Emotionally Abused: No   • Physically Abused: No   • Sexually Abused: No   Housing Stability: Not on file     Allergies   Allergen Reactions   • Aspirin Other (see comments)     Asthma attack   • Hydrocodone-Acetaminophen GI intolerance     CONSTIPATION       Current Outpatient Medications:   •  albuterol HFA (VENTOLIN HFA) 90 mcg/actuation inhaler, Inhale 2 puffs every 6 (six) hours as needed for wheezing. (Patient not taking: Reported on 12/23/2021 ), Disp: 18 g, Rfl: 1  •  clonazePAM 0.5 mg tablet, Take 1 tablet (0.5 mg total) by mouth daily., Disp: 30 tablet, Rfl: 0  •  CYANOCOBALAMIN, VITAMIN B-12, ORAL, Take 1,000 mg by mouth daily., Disp: , Rfl:   •  DULoxetine (CYMBALTA) 30 mg capsule, Take 30 mg by mouth daily., Disp: , Rfl:   •  finasteride  (PROSCAR) 5 mg tablet, Take 1 tablet by mouth daily., Disp: , Rfl:   •  fluticasone propion-salmeteroL (ADVAIR DISKUS) 250-50 mcg/dose diskus inhaler, Inhale 1 puff 2 (two) times a day., Disp: 180 each, Rfl: 3  •  fluticasone propionate 50 mcg/actuation nasal spray, ADMINISTER 1 SPRAY INTO EACH NOSTRIL NIGHTLY., Disp: 32 g, Rfl: 3  •  ketoconazole (NIZORAL) 2 % shampoo, , Disp: , Rfl:   •  metoprolol succinate XL (TOPROL-XL) 25 mg 24 hr tablet, TAKE 1 TABLET DAILY  (APPOINTMENT PENDING OCTOBER 2021), Disp: 90 tablet, Rfl: 1  •  montelukast 10 mg tablet, TAKE 1 TABLET EVERY NIGHT, Disp: 90 tablet, Rfl: 3  •  rosuvastatin 10 mg tablet, TAKE 1 TABLET  NIGHTLY, Disp: 90 tablet, Rfl: 3  •  valsartan-hydrochlorothiazide (DIOVAN-HCT) 80-12.5 mg per tablet, Take 1 tablet by mouth daily., Disp: 90 tablet, Rfl: 5  Health Maintenance   Topic Date Due   • DTaP, Tdap, and Td Vaccines (1 - Tdap) Never done   • Zoster Vaccine (1 of 2) Never done   • COVID-19 Vaccine (3 - Moderna risk 4-dose series) 12/27/2021   • Annual Falls Risk Screening  01/01/2022   • Medicare Annual Wellness Visit  10/18/2022   • Influenza Vaccine  Completed   • Hepatitis C Screening  Completed   • Pneumococcal  Completed   • Pneumococcal (65 years and older)  Completed   • Meningococcal ACWY  Aged Out   • HIB Vaccines  Aged Out   • IPV Vaccines  Aged Out   • HPV Vaccines  Aged Out     There were no vitals filed for this visit.  Objective     Results for orders placed or performed in visit on 10/18/21   Lipid Prof w Refl   Result Value Ref Range    Cholesterol, Total 164 <200 mg/dL    Hdl Cholesterol 58 > OR = 40 mg/dL    Triglycerides 86 <150 mg/dL    Ldl-Cholesterol 88 mg/dL (calc)    Chol/Hdlc Ratio 2.8 <5.0 (calc)    Non Hdl Cholesterol 106 <130 mg/dL (calc)   Hemoglobin A1c   Result Value Ref Range    Hemoglobin A1C 5.6 <5.7 % of total Hgb   Comprehensive metabolic panel   Result Value Ref Range    Glucose 92 65 - 99 mg/dL    BUN 12 7 - 25 mg/dL     Creatinine 0.84 0.70 - 1.18 mg/dL    Egfr Non-Afr. American 84 > OR = 60 mL/min/1.73m2    Egfr  97 > OR = 60 mL/min/1.73m2    Bun/Creatinine Ratio NOT APPLICABLE 6 - 22 (calc)    Sodium 136 135 - 146 mmol/L    Potassium 4.0 3.5 - 5.3 mmol/L    Chloride 101 98 - 110 mmol/L    Carbon Dioxide 29 20 - 32 mmol/L    Calcium 9.9 8.6 - 10.3 mg/dL    Protein, Total 6.4 6.1 - 8.1 g/dL    Albumin 4.1 3.6 - 5.1 g/dL    Globulin 2.3 1.9 - 3.7 g/dL (calc)    Albumin/Globulin Ratio 1.8 1.0 - 2.5 (calc)    Bilirubin, Total 0.8 0.2 - 1.2 mg/dL    Alkaline Phosphatase 78 35 - 144 U/L    Ast 21 10 - 35 U/L    Alt 20 9 - 46 U/L     There were no vitals filed for this visit.  There is no height or weight on file to calculate BMI.  BP Readings from Last 3 Encounters:   12/23/21 112/66   10/18/21 126/72   04/22/21 122/78     Wt Readings from Last 3 Encounters:   12/23/21 86.2 kg (190 lb)   10/18/21 87.1 kg (192 lb)   04/22/21 86.6 kg (191 lb)     Vital signs reviewed  Physical Exam  Vitals reviewed.       Constitutional:       Appearance: He is well-developed.   HENT:      Head: Normocephalic and atraumatic. Teeth no loose gums WNL  Airway patent    Eyes:      Conjunctiva/sclera: Conjunctivae normal.   Cardiovascular:      Rate and Rhythm: Normal rate and regular rhythm.      Heart sounds: Normal heart sounds.   Pulmonary:      Effort: Pulmonary effort is normal. No respiratory distress.      Breath sounds: Normal breath sounds. No wheezing.   Abdominal:      General: Bowel sounds are normal.      Palpations: Abdomen is soft.   Musculoskeletal:         General: No deformity.      Left hand: Decreased strength.      Cervical back: Normal range of motion and neck supple.      Right lower leg: No edema.      Left lower leg: No edema.      Comments: There is weakness of the left fifth finger both with flexion and extensionsubtle atrophy of the hyperthenar eminence compared to the other side.   Skin:     General: Skin is warm  and dry. Sutures in Left upper arm  Post surgical intact no dc  Healing  .  Neurological:      Mental Status: He is alert and oriented to person, place, and time.   Psychiatric:         Behavior: Behavior normal.      EKG  SR  HR   85  Non specific  QRS wide  . 117 no acute ischemia  Compared to EKG    July 12 2019    Now in SR   No  ST abnormalities       Chemistry        Component Value Date/Time     11/03/2021 0838     07/15/2019 0507    K 4.0 11/03/2021 0838    K 4.2 07/15/2019 0507     11/03/2021 0838     07/15/2019 0507    CO2 29 11/03/2021 0838    CO2 25 07/15/2019 0507    BUN 12 11/03/2021 0838    BUN 8 07/15/2019 0507    CREATININE 0.84 11/03/2021 0838    CREATININE 0.8 07/15/2019 0507        Component Value Date/Time    CALCIUM 9.9 11/03/2021 0838    CALCIUM 9.4 07/15/2019 0507    ALKPHOS 78 11/03/2021 0838    AST 21 11/03/2021 0838    ALT 20 11/03/2021 0838    BILITOT 0.8 11/03/2021 0838          Lab Results   Component Value Date    HGBA1C 5.6 11/03/2021     Lab Results   Component Value Date    TSH 1.26 11/15/2019     Lab Results   Component Value Date    WBC 6.23 07/15/2019    HGB 11.6 (L) 07/15/2019    HCT 35.3 (L) 07/15/2019    MCV 90.3 07/15/2019     07/15/2019       Lab Results   Component Value Date    LDLCALC 88 11/03/2021       Assessment and Plan   No problem-specific Assessment & Plan notes found for this encounter.      No orders of the defined types were placed in this encounter.        STEPHANIE Wolf  3/3/2022

## 2022-05-05 DIAGNOSIS — I10 ESSENTIAL HYPERTENSION: ICD-10-CM

## 2022-05-05 RX ORDER — VALSARTAN AND HYDROCHLOROTHIAZIDE 80; 12.5 MG/1; MG/1
TABLET, FILM COATED ORAL
Qty: 90 TABLET | Refills: 1 | Status: SHIPPED | OUTPATIENT
Start: 2022-05-05 | End: 2022-12-12

## 2022-05-25 ENCOUNTER — OFFICE VISIT (OUTPATIENT)
Dept: CARDIOLOGY | Facility: CLINIC | Age: 77
End: 2022-05-25
Payer: COMMERCIAL

## 2022-05-25 VITALS
BODY MASS INDEX: 27.85 KG/M2 | WEIGHT: 188 LBS | SYSTOLIC BLOOD PRESSURE: 146 MMHG | HEART RATE: 64 BPM | HEIGHT: 69 IN | OXYGEN SATURATION: 93 % | DIASTOLIC BLOOD PRESSURE: 80 MMHG

## 2022-05-25 DIAGNOSIS — R73.03 PREDIABETES: ICD-10-CM

## 2022-05-25 DIAGNOSIS — I97.89 POSTOPERATIVE ATRIAL FIBRILLATION (CMS/HCC): Primary | ICD-10-CM

## 2022-05-25 DIAGNOSIS — I48.91 POSTOPERATIVE ATRIAL FIBRILLATION (CMS/HCC): Primary | ICD-10-CM

## 2022-05-25 DIAGNOSIS — I10 PRIMARY HYPERTENSION: ICD-10-CM

## 2022-05-25 DIAGNOSIS — I25.10 CORONARY ARTERY DISEASE INVOLVING NATIVE CORONARY ARTERY OF NATIVE HEART WITHOUT ANGINA PECTORIS: ICD-10-CM

## 2022-05-25 DIAGNOSIS — D15.1 PAPILLARY FIBROELASTOMA OF HEART: ICD-10-CM

## 2022-05-25 PROBLEM — E78.00 PURE HYPERCHOLESTEROLEMIA: Status: ACTIVE | Noted: 2019-07-13

## 2022-05-25 PROCEDURE — 3008F BODY MASS INDEX DOCD: CPT | Performed by: INTERNAL MEDICINE

## 2022-05-25 PROCEDURE — 99214 OFFICE O/P EST MOD 30 MIN: CPT | Performed by: INTERNAL MEDICINE

## 2022-05-25 RX ORDER — ROSUVASTATIN CALCIUM 10 MG/1
10 TABLET, COATED ORAL DAILY
Qty: 90 TABLET | Refills: 3
Start: 2022-05-25 | End: 2022-06-20 | Stop reason: SDUPTHER

## 2022-05-25 ASSESSMENT — CHADS2 SCORE
AGE: 75+ (+2 PT.)
CHF: NO
PRIOR STROKE OR TIA OR THROMBOEMBOLISM: NO
HYPERTENSION: YES (+1 PT.)
DIABETES: NO
VASCULAR DISEASE: NO
SEX: MALE
CHADS2 SCORE: 3

## 2022-05-25 ASSESSMENT — ENCOUNTER SYMPTOMS
ODYNOPHAGIA: 0
BRIEF PARALYSIS: 0
HEMATOCHEZIA: 0
SPUTUM PRODUCTION: 0
WHEEZING: 0
ABDOMINAL PAIN: 0
DIAPHORESIS: 0
TREMORS: 0
FALLS: 0
MYALGIAS: 0
HOARSE VOICE: 0
FEVER: 0
HEMATEMESIS: 0
FOCAL WEAKNESS: 0
HALLUCINATIONS: 0
HEMOPTYSIS: 0
DYSURIA: 0
BLURRED VISION: 0
POLYDIPSIA: 0
ALTERED MENTAL STATUS: 0

## 2022-05-25 NOTE — ASSESSMENT & PLAN NOTE
The last A1c which I can find was from November 2021 was 5.6 usually regarded as the upper limits of normal.  Prudent diet recommended.

## 2022-05-25 NOTE — PROGRESS NOTES
Cardiology New Outpatient      Saad Hillman is a 77 y.o. male  who presents with his wife to establish cardiovascular care.    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  Mild coronary disease.  Hypercholesterolemia, hypertension and a history of prediabetes.    He feels well.  In 2019 based on some pedal edema an echocardiogram was performed which demonstrated normal systolic function but appropriately fibroelastoma his mitral valve which was excised.  He never had a stroke.  As part of the preoperative evaluation catheterization demonstrated mild coronary disease except an 80% lesion distal LAD.  Medical treatment was advised.  He never had angina.  He denies any history of myocardial infarction, congestive heart failure, stroke, arrhythmias other than postoperative atrial fibrillation, valvular heart disease, peripheral disease or thromboembolic disease.  He denies any angina, exertional dyspnea, orthopnea, palpitation tachycardia, syncope, near syncope or claudication or focal neurologic symptoms.    Past medical history as below.    Family history: No premature cardiovascular disease     Past Medical History:   Diagnosis Date   • Allergies     Seasonal   • Arthritis     b/l shoulders- cortisone injections in 2016   • Asthma    • Atrial myxoma     seen on echo    • BPH (benign prostatic hyperplasia)     was on meds- resolved after TURP in  2017, no meds currently    • Hypertension    • Lipid disorder    • Medial meniscus tear     left knee- had cortisone injection in 2016   • Seasonal allergies    • Sleep disorder     REM sleep disorder per Dr. 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       :   Past Surgical History:   Procedure Laterality Date   • CARDIAC CATHETERIZATION  06/26/2019    [facility]   • FINGER SURGERY      s/p traumatic amputation- had repaired    • HERNIA REPAIR  2012    left inguinal hernia repair    • LIPOMA RESECTION  1999     right shoulder x 2 and back of neck    • MITRAL VALVE SURGERY  07/09/2019   • MOHS DEFECT REPAIR  2021    Nose   • NASAL POLYP EXCISION  1966- 1999    x 4    • PROSTATE BIOPSY  01/08/2019   • STEROID INJECTION KNEE Bilateral 10/2020   • TONSILLECTOMY  1948   • TRANSURETHRAL RESECTION OF PROSTATE  2017   • VASECTOMY  1980's     Allergies:   Aspirin and Hydrocodone-acetaminophen    Current Outpatient Medications   Medication Sig Dispense Refill   • albuterol HFA (VENTOLIN HFA) 90 mcg/actuation inhaler Inhale 2 puffs every 6 (six) hours as needed for wheezing. 18 g 1   • clonazePAM 0.5 mg tablet Take 1 tablet (0.5 mg total) by mouth daily. 30 tablet 0   • CYANOCOBALAMIN, VITAMIN B-12, ORAL Take 1,000 mg by mouth daily.     • DULoxetine (CYMBALTA) 30 mg capsule Take 30 mg by mouth daily.     • finasteride (PROSCAR) 5 mg tablet Take 1 tablet by mouth daily.     • fluticasone propion-salmeteroL (ADVAIR DISKUS) 250-50 mcg/dose diskus inhaler Inhale 1 puff 2 (two) times a day. 180 each 3   • fluticasone propionate 50 mcg/actuation nasal spray ADMINISTER 1 SPRAY INTO EACH NOSTRIL NIGHTLY. 32 g 3   • ketoconazole (NIZORAL) 2 % shampoo      • metoprolol succinate XL (TOPROL-XL) 25 mg 24 hr tablet TAKE 1 TABLET DAILY  (APPOINTMENT PENDING OCTOBER 2021) 90 tablet 1   • montelukast 10 mg tablet TAKE 1 TABLET EVERY NIGHT 90 tablet 3   • rosuvastatin (CRESTOR) 10 mg tablet Take 1 tablet (10 mg total) by mouth daily. 90 tablet 3   • valsartan-hydrochlorothiazide (DIOVAN-HCT) 80-12.5 mg per tablet TAKE 1 TABLET EVERY DAY 90 tablet 1     No current facility-administered medications for this visit.          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   HENT: Negative for hoarse voice and odynophagia.    Eyes: Negative for blurred vision and visual disturbance.   Respiratory: Negative for hemoptysis, sputum production and wheezing.    Endocrine: Negative for cold intolerance, heat intolerance and polydipsia.   Skin: Negative for rash.   Musculoskeletal: Negative for falls, muscle weakness and myalgias.   Gastrointestinal: Negative for abdominal pain, hematemesis and hematochezia.   Genitourinary: Negative for dysuria.   Neurological: Negative for brief paralysis, focal weakness and tremors.   Psychiatric/Behavioral: Negative for altered mental status and hallucinations.       Objective     Visit Vitals  BP (!) 146/80 (BP Location: Right upper arm, Patient Position: Sitting)   Pulse 64   Ht 1.753 m (5' 9\")   Wt 85.3 kg (188 lb)   SpO2 93%   BMI 27.76 kg/m²     Wt Readings from Last 3 Encounters:   05/25/22 85.3 kg (188 lb)   03/03/22 86.2 kg (190 lb)   12/23/21 86.2 kg (190 lb)       Physical Exam  Vitals reviewed.   Constitutional:       Appearance: He is well-developed. He is not diaphoretic.   HENT:      Head: Atraumatic.   Eyes:      General: No scleral icterus.     Conjunctiva/sclera: Conjunctivae normal.   Neck:      Thyroid: No thyromegaly.      Vascular: No carotid bruit or JVD.      Trachea: No tracheal deviation.   Cardiovascular:      Rate and Rhythm: Normal rate and regular rhythm.      Heart sounds: Normal heart sounds. No murmur heard.    No friction rub. No gallop. "   Pulmonary:      Effort: Pulmonary effort is normal. No respiratory distress.      Breath sounds: Normal breath sounds. No wheezing or rales.   Abdominal:      General: Bowel sounds are normal. There is no distension.      Palpations: Abdomen is soft.   Musculoskeletal:         General: No tenderness.   Skin:     General: Skin is warm and dry.   Neurological:      Mental Status: He is alert and oriented to person, place, and time.      Cranial Nerves: No cranial nerve deficit.          Labs   Lab Results   Component Value Date    WBC 6.23 07/15/2019    HGB 11.6 (L) 07/15/2019    HCT 35.3 (L) 07/15/2019     07/15/2019    CHOL 164 11/03/2021    TRIG 86 11/03/2021    HDL 58 11/03/2021    LDLCALC 88 11/03/2021    ALT 20 11/03/2021    AST 21 11/03/2021     11/03/2021    K 4.0 11/03/2021     11/03/2021    CREATININE 0.84 11/03/2021    BUN 12 11/03/2021    CO2 29 11/03/2021    TSH 1.26 11/15/2019    INR 1.2 07/12/2019    GLUCOSE 92 11/03/2021    HGBA1C 5.6 11/03/2021       Cardiac Imaging: No recent imaging for review..  5/2019 echo at [facility] conclusions:    1. Overall left ventricular systolic function is normal with, an EF between 60 - 65 %.   2. The right ventricular systolic function is normal.   3. The aortic valve is trileaflet and appears structurally normal.   4. There is a 1.2cm x 1.1cm papillary fibroelastoma attached to the chordae/subvalvular apparatus of the anterior mitral valve leaflet. The mitral valve structure is intact with trace mitral regurgitation.    5. Right ventricular systolic pressure is normal at < 35 mmHg.   6. No prior study for comparison.       ECG   3/3/2022 sinus rhythm with mild QRS widening and minor T wave changes.  Lead I was not available for evaluation.    Assessment/Plan     Problem List Items Addressed This Visit        High    Papillary fibroelastoma of heart    Overview     2019 echo mitral valve at [place]  2019 excised at [facility] Dr. Mart            Current Assessment & Plan     He never had a stroke.  This was incidental finding on an echo which was performed due to pedal edema which resolved without intervention.  No intervention is needed.           Relevant Medications    rosuvastatin (CRESTOR) 10 mg tablet    Postoperative atrial fibrillation (CMS/HCC) - Primary    Overview     2019 heart surgery           Current Assessment & Plan     He denies any symptoms prior to the surgical episode or since the surgical episode.  Metoprolol was only started based on surgery.  Therefore in the future will wean off metoprolol if possible.  His blood pressure was mildly elevated but reviewing his chart usually it is much lower so weaning off of metoprolol should be reasonable.  If needed for blood pressure control we can always increase his valsartan hydrochlorothiazide.           Relevant Medications    rosuvastatin (CRESTOR) 10 mg tablet    Coronary artery disease involving native coronary artery of native heart without angina pectoris    Overview     6/2019 cath:  LM -  angiographically normal.   Left Anterior Descending    is moderate in size. 30% proximal-mid LAD stenosis and an 80% apical LAD stenosis.   Left Circumflex  is moderate in size.      Right Coronary Artery   The vessel was visualized by selective angiography and is moderate in size. Mild diffuse distal RCA disease.                Current Assessment & Plan     Reviewed that he does have coronary disease along with not severe, except for the distal LAD stenosis, and a guideline directed therapy would be prudent.  Therefore will intensify statin therapy as reviewed below.           Relevant Medications    rosuvastatin (CRESTOR) 10 mg tablet    Other Relevant Orders    Comprehensive metabolic panel    Lipid panel       Medium    Primary hypertension    Current Assessment & Plan     Mildly elevated today but reviewing his chart blood pressure usually much lower and therefore no changes.            Prediabetes    Current Assessment & Plan     The last A1c which I can find was from November 2021 was 5.6 usually regarded as the upper limits of normal.  Prudent diet recommended.                  Orders Placed This Encounter   Procedures   • Comprehensive metabolic panel     Standing Status:   Future     Number of Occurrences:   1     Standing Expiration Date:   5/25/2023     Order Specific Question:   Release to patient     Answer:   Immediate   • Lipid panel     Standing Status:   Future     Number of Occurrences:   1     Standing Expiration Date:   5/25/2023     Order Specific Question:   Release to patient     Answer:   Immediate         Buck Macedo DO  5/25/2022  4:50 PM    This patient note has been dictated using speech recognition software. Inadvertent speech recognition errors should be disregarded. Please do not hesitate to call my office for clarifications.

## 2022-05-25 NOTE — ASSESSMENT & PLAN NOTE
His last LDL was 88.  Given his coronary disease ideally a lower LDL would be better.    Presently he is only taking his rosuvastatin 3 times a week as he was told by her previous provider that a lower dose is better to avoid liver toxicity.  He never had side effects.    Therefore recommended taking rosuvastatin daily and about 4-6 weeks later to check his labs.

## 2022-05-25 NOTE — ASSESSMENT & PLAN NOTE
Reviewed that he does have coronary disease along with not severe, except for the distal LAD stenosis, and a guideline directed therapy would be prudent.  Therefore will intensify statin therapy as reviewed below.

## 2022-05-25 NOTE — ASSESSMENT & PLAN NOTE
Mildly elevated today but reviewing his chart blood pressure usually much lower and therefore no changes.

## 2022-05-25 NOTE — ASSESSMENT & PLAN NOTE
He denies any symptoms prior to the surgical episode or since the surgical episode.  Metoprolol was only started based on surgery.  Therefore in the future will wean off metoprolol if possible.  His blood pressure was mildly elevated but reviewing his chart usually it is much lower so weaning off of metoprolol should be reasonable.  If needed for blood pressure control we can always increase his valsartan hydrochlorothiazide.

## 2022-06-20 DIAGNOSIS — E78.00 PURE HYPERCHOLESTEROLEMIA: Primary | ICD-10-CM

## 2022-06-20 RX ORDER — ROSUVASTATIN CALCIUM 10 MG/1
10 TABLET, COATED ORAL DAILY
Qty: 90 TABLET | Refills: 3 | Status: SHIPPED | OUTPATIENT
Start: 2022-06-20 | End: 2023-11-06

## 2022-07-18 DIAGNOSIS — I10 ESSENTIAL HYPERTENSION: ICD-10-CM

## 2022-07-18 RX ORDER — METOPROLOL SUCCINATE 25 MG/1
TABLET, EXTENDED RELEASE ORAL
Qty: 90 TABLET | Refills: 1 | Status: SHIPPED | OUTPATIENT
Start: 2022-07-18 | End: 2023-02-22

## 2022-08-18 ENCOUNTER — TELEPHONE (OUTPATIENT)
Dept: CARDIOLOGY | Facility: CLINIC | Age: 77
End: 2022-08-18
Payer: COMMERCIAL

## 2022-08-18 LAB
ALBUMIN SERPL-MCNC: 4.1 G/DL (ref 3.6–5.1)
ALBUMIN/GLOB SERPL: 1.6 (CALC) (ref 1–2.5)
ALP SERPL-CCNC: 76 U/L (ref 35–144)
ALT SERPL-CCNC: 25 U/L (ref 9–46)
AST SERPL-CCNC: 25 U/L (ref 10–35)
BILIRUB SERPL-MCNC: 1 MG/DL (ref 0.2–1.2)
BUN SERPL-MCNC: 12 MG/DL (ref 7–25)
BUN/CREAT SERPL: NORMAL (CALC) (ref 6–22)
CALCIUM SERPL-MCNC: 9.8 MG/DL (ref 8.6–10.3)
CHLORIDE SERPL-SCNC: 99 MMOL/L (ref 98–110)
CHOLEST SERPL-MCNC: 166 MG/DL
CHOLEST/HDLC SERPL: 2.6 (CALC)
CO2 SERPL-SCNC: 27 MMOL/L (ref 20–32)
CREAT SERPL-MCNC: 0.86 MG/DL (ref 0.7–1.28)
EGFRCR SERPLBLD CKD-EPI 2021: 88 ML/MIN/1.73M2
GLOBULIN SER CALC-MCNC: 2.6 G/DL (CALC) (ref 1.9–3.7)
GLUCOSE SERPL-MCNC: 92 MG/DL (ref 65–99)
HDLC SERPL-MCNC: 65 MG/DL
LDLC SERPL CALC-MCNC: 83 MG/DL (CALC)
NONHDLC SERPL-MCNC: 101 MG/DL (CALC)
POTASSIUM SERPL-SCNC: 4 MMOL/L (ref 3.5–5.3)
PROT SERPL-MCNC: 6.7 G/DL (ref 6.1–8.1)
SODIUM SERPL-SCNC: 135 MMOL/L (ref 135–146)
TRIGL SERPL-MCNC: 86 MG/DL

## 2022-08-18 NOTE — TELEPHONE ENCOUNTER
Spoke to pt to review recent lab results, LDL was 83. Presently taking Rosuvastatin 10 mg 5 times per week. Will increase to daily. Denies any side effects.

## 2022-10-19 ENCOUNTER — OFFICE VISIT (OUTPATIENT)
Dept: PRIMARY CARE | Facility: CLINIC | Age: 77
End: 2022-10-19
Payer: COMMERCIAL

## 2022-10-19 VITALS
OXYGEN SATURATION: 96 % | WEIGHT: 191 LBS | TEMPERATURE: 97.3 F | SYSTOLIC BLOOD PRESSURE: 140 MMHG | HEART RATE: 70 BPM | DIASTOLIC BLOOD PRESSURE: 80 MMHG | RESPIRATION RATE: 16 BRPM | BODY MASS INDEX: 28.21 KG/M2

## 2022-10-19 DIAGNOSIS — Z00.00 MEDICARE ANNUAL WELLNESS VISIT, SUBSEQUENT: ICD-10-CM

## 2022-10-19 DIAGNOSIS — J45.40 MODERATE PERSISTENT ASTHMA, UNSPECIFIED WHETHER COMPLICATED: ICD-10-CM

## 2022-10-19 DIAGNOSIS — R73.03 PREDIABETES: ICD-10-CM

## 2022-10-19 DIAGNOSIS — G62.9 NEUROPATHY: ICD-10-CM

## 2022-10-19 DIAGNOSIS — I25.10 CORONARY ARTERY DISEASE INVOLVING NATIVE CORONARY ARTERY OF NATIVE HEART WITHOUT ANGINA PECTORIS: ICD-10-CM

## 2022-10-19 DIAGNOSIS — C43.62 MALIGNANT MELANOMA OF LEFT UPPER ARM (CMS/HCC): ICD-10-CM

## 2022-10-19 DIAGNOSIS — I48.91 POSTOPERATIVE ATRIAL FIBRILLATION (CMS/HCC): ICD-10-CM

## 2022-10-19 DIAGNOSIS — E78.00 PURE HYPERCHOLESTEROLEMIA: ICD-10-CM

## 2022-10-19 DIAGNOSIS — I10 PRIMARY HYPERTENSION: ICD-10-CM

## 2022-10-19 DIAGNOSIS — C43.31 MALIGNANT MELANOMA OF NOSE (CMS/HCC): ICD-10-CM

## 2022-10-19 DIAGNOSIS — I97.89 POSTOPERATIVE ATRIAL FIBRILLATION (CMS/HCC): ICD-10-CM

## 2022-10-19 DIAGNOSIS — Z23 NEED FOR VACCINATION: Primary | ICD-10-CM

## 2022-10-19 PROCEDURE — G0439 PPPS, SUBSEQ VISIT: HCPCS | Performed by: INTERNAL MEDICINE

## 2022-10-19 PROCEDURE — G0008 ADMIN INFLUENZA VIRUS VAC: HCPCS | Performed by: INTERNAL MEDICINE

## 2022-10-19 PROCEDURE — 90694 VACC AIIV4 NO PRSRV 0.5ML IM: CPT | Performed by: INTERNAL MEDICINE

## 2022-10-19 RX ORDER — FLUTICASONE PROPIONATE 50 MCG
1 SPRAY, SUSPENSION (ML) NASAL NIGHTLY
Qty: 32 G | Refills: 3 | Status: SHIPPED | OUTPATIENT
Start: 2022-10-19 | End: 2023-08-02

## 2022-10-19 ASSESSMENT — MINI COG
COMPLETED: YES
TOTAL SCORE: 5

## 2022-10-19 ASSESSMENT — PATIENT HEALTH QUESTIONNAIRE - PHQ9: SUM OF ALL RESPONSES TO PHQ9 QUESTIONS 1 & 2: 0

## 2022-10-19 NOTE — ASSESSMENT & PLAN NOTE
Screenings andCare to review, health risk assessment reviewed, .  Fall risk, cognitive screen done, depression screen done.  Screening exams and immunizations reviewed.  Flu shot given.

## 2022-10-19 NOTE — ASSESSMENT & PLAN NOTE
Blood pressure elevated initially but came down on second check continue valsartan hydrochlorothiazide and Toprol

## 2022-10-19 NOTE — PROGRESS NOTES
Daily Progress Note      Subjective      Patient ID: Saad Hillman is a 77 y.o. male.  Chief Complaint   Patient presents with    Medicare Subsequent Annual Wellness Visit    Follow-up     He is following up with the Podiatrist at [facility]  *    Foot issue  r foot, 5th mt prominence  rec to see podiatrist for orthotic  rec to use lidocaine patch  Had carpal tunnel and cubital release this year  rom of  5th finger improved      Dr ferreira increased crestor from 3x weekly to 5x weekly    The following have been reviewed and updated as appropriate in this visit:   Allergies  Meds  Problems       Review of Systems  Patient Active Problem List   Diagnosis    Asthma    Seasonal allergies    BPH (benign prostatic hyperplasia)    Traumatic amputation of finger    Arthritis    Medial meniscus tear    Papillary fibroelastoma of heart    Postoperative atrial fibrillation (CMS/HCC)    Primary hypertension    Pure hypercholesterolemia    Bilateral hearing loss    Neuropathy    Edema    Prediabetes    Hematuria    Elevated PSA    Tremor    Bilateral carpal tunnel syndrome    REM sleep behavior disorder    Medicare annual wellness visit, subsequent    Hyponatremia    Skin lesion of back    Urethral stricture    Malignant melanoma of nose (CMS/HCC)    Ulnar neuropathy at wrist, left    Nasal polyp    Preop general physical exam    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 30 tablet 0    CYANOCOBALAMIN, VITAMIN B-12, ORAL Take 1,000 mg by mouth daily.      finasteride (PROSCAR) 5 mg tablet Take 1 tablet by mouth daily.      fluticasone propion-salmeteroL (ADVAIR DISKUS) 250-50 mcg/dose diskus inhaler Inhale 1 puff 2 (two) times a day. 180 each 3     fluticasone propionate (FLONASE) 50 mcg/actuation nasal spray Administer 1 spray into each nostril nightly. 32 g 3    ketoconazole (NIZORAL) 2 % shampoo       metoprolol succinate XL (TOPROL-XL) 25 mg 24 hr tablet TAKE 1 TABLET EVERY DAY 90 tablet 1    montelukast 10 mg tablet TAKE 1 TABLET EVERY NIGHT 90 tablet 3    rosuvastatin (CRESTOR) 10 mg tablet Take 1 tablet (10 mg total) by mouth daily. 90 tablet 3    valsartan-hydrochlorothiazide (DIOVAN-HCT) 80-12.5 mg per tablet TAKE 1 TABLET EVERY DAY 90 tablet 1    albuterol HFA (VENTOLIN HFA) 90 mcg/actuation inhaler Inhale 2 puffs every 6 (six) hours as needed for wheezing. (Patient not taking: Reported on 10/19/2022) 18 g 1    DULoxetine (CYMBALTA) 30 mg capsule Take 30 mg by mouth daily.       No current facility-administered medications for this visit.     Past Medical History:   Diagnosis Date    Allergies     Seasonal    Arthritis     b/l shoulders- cortisone injections in 2016    Asthma     Atrial myxoma     seen on echo     BPH (benign prostatic hyperplasia)     was on meds- resolved after TURP in  2017, no meds currently     Hypertension     Lipid disorder     Medial meniscus tear     left knee- had cortisone injection in 2016    Seasonal allergies     Sleep disorder     REM sleep disorder per Dr. 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     Family History   Problem Relation Age of Onset    Abdominal Aortic Aneurysm Biological Mother     Hyperlipidemia Biological Mother     Stroke Biological Father      Past Surgical History:   Procedure Laterality Date    CARDIAC [facility]IZATION  06/26/2019    [facility]    CARPAL TUNNEL RELEASE Left 01/2022    CATARACT EXTRACTION, BILATERAL Bilateral 01/2022    FINGER SURGERY      s/p traumatic amputation- had repaired     HERNIA REPAIR  2012    left inguinal hernia repair     LIPOMA RESECTION  1999    right shoulder x 2 and back of neck      MITRAL VALVE SURGERY  07/09/2019    MOHS DEFECT REPAIR  2021    Nose    NASAL POLYP EXCISION  1966- 1999    x 4     PROSTATE BIOPSY  01/08/2019    SKIN LESION EXCISION Left 2021    Melanoma on left arm    STEROID INJECTION KNEE Bilateral 10/2020    TONSILLECTOMY  1948    TRANSURETHRAL RESECTION OF PROSTATE  2017    VASECTOMY  1980's     Social History     Socioeconomic History    Marital status:      Spouse name: Umu     Number of children: 2    Years of education: Not on file    Highest education level: Not on file   Occupational History    Not on file   Tobacco Use    Smoking status: Never    Smokeless tobacco: Never   Vaping Use    Vaping Use: Never used   Substance and Sexual Activity    Alcohol use: Yes     Comment: occasional     Drug use: No    Sexual activity: Defer   Other Topics Concern    Not on file   Social History Narrative    Retired Draftsman, , 1 son, 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 has replica chip     Social Determinants of Health     Financial Resource Strain: Not on file   Food Insecurity: Not on file   Transportation Needs: Not on file   Physical Activity: Not on file   Stress: Not on file   Social Connections: Not on file   Intimate Partner Violence: Not on file   Housing Stability: Not on file     Allergies   Allergen Reactions    Aspirin Other (see comments)     Asthma attack    Hydrocodone-Acetaminophen GI intolerance     CONSTIPATION     Vitals:    10/19/22 1320   BP: 140/80   Pulse:    Resp:    Temp:    SpO2:      Objective     Results for orders placed or performed in visit on 05/25/22   Comprehensive metabolic panel   Result Value Ref Range    Glucose 92 65 - 99 mg/dL    BUN 12 7 - 25 mg/dL    Creatinine 0.86 0.70 - 1.28 mg/dL    EGFR 88 > OR = 60 mL/min/1.73m2    Bun/Creatinine Ratio NOT APPLICABLE 6 - 22 (calc)    Sodium 135 135 - 146 mmol/L    Potassium 4.0 3.5 - 5.3 mmol/L    Chloride 99 98 - 110 mmol/L     Carbon Dioxide 27 20 - 32 mmol/L    Calcium 9.8 8.6 - 10.3 mg/dL    Protein, Total 6.7 6.1 - 8.1 g/dL    Albumin 4.1 3.6 - 5.1 g/dL    Globulin 2.6 1.9 - 3.7 g/dL (calc)    Albumin/Globulin Ratio 1.6 1.0 - 2.5 (calc)    Bilirubin, Total 1.0 0.2 - 1.2 mg/dL    Alkaline Phosphatase 76 35 - 144 U/L    Ast 25 10 - 35 U/L    Alt 25 9 - 46 U/L   Lipid panel   Result Value Ref Range    Cholesterol, Total 166 <200 mg/dL    Hdl Cholesterol 65 > OR = 40 mg/dL    Triglycerides 86 <150 mg/dL    Ldl-Cholesterol 83 mg/dL (calc)    Chol/Hdlc Ratio 2.6 <5.0 (calc)    Non Hdl Cholesterol 101 <130 mg/dL (calc)     Vitals:    10/19/22 1320   BP: 140/80   Pulse:    Resp:    Temp:    SpO2:      Vital signs reviewed  Physical Exam  Constitutional:       Appearance: He is well-developed.   HENT:      Head: Normocephalic and atraumatic.   Eyes:      Conjunctiva/sclera: Conjunctivae normal.   Cardiovascular:      Rate and Rhythm: Normal rate and regular rhythm.      Heart sounds: Normal heart sounds.   Pulmonary:      Effort: Pulmonary effort is normal. No respiratory distress.      Breath sounds: Normal breath sounds. No wheezing.   Abdominal:      General: Bowel sounds are normal.      Palpations: Abdomen is soft.   Musculoskeletal:         General: No deformity.      Cervical back: Normal range of motion and neck supple.      Right lower leg: No edema.      Left lower leg: No edema.   Skin:     General: Skin is warm and dry.   Neurological:      Mental Status: He is alert and oriented to person, place, and time.   Psychiatric:         Behavior: Behavior normal.             Chemistry        Component Value Date/Time     08/17/2022 0844     07/15/2019 0507    K 4.0 08/17/2022 0844    K 4.2 07/15/2019 0507    CL 99 08/17/2022 0844     07/15/2019 0507    CO2 27 08/17/2022 0844    CO2 25 07/15/2019 0507    BUN 12 08/17/2022 0844    BUN 8 07/15/2019 0507    CREATININE 0.86 08/17/2022 0844    CREATININE 0.8 07/15/2019 0507         Component Value Date/Time    CALCIUM 9.8 08/17/2022 0844    CALCIUM 9.4 07/15/2019 0507    ALKPHOS 76 08/17/2022 0844    AST 25 08/17/2022 0844    ALT 25 08/17/2022 0844    BILITOT 1.0 08/17/2022 0844          Lab Results   Component Value Date    HGBA1C 5.6 11/03/2021     Lab Results   Component Value Date    TSH 1.26 11/15/2019     Lab Results   Component Value Date    WBC 6.23 07/15/2019    HGB 11.6 (L) 07/15/2019    HCT 35.3 (L) 07/15/2019    MCV 90.3 07/15/2019     07/15/2019       Lab Results   Component Value Date    LDLCALC 83 08/17/2022       Assessment and Plan   Neuropathy  Follows with Dr Orellana  Started on duloxetine      Medicare annual wellness visit, subsequent  Screenings andCare to review, health risk assessment reviewed, .  Fall risk, cognitive screen done, depression screen done.  Screening exams and immunizations reviewed.  Flu shot given.    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  Continue medical management.    Asthma  Follows with Dr. Linda continue on Advair    Postoperative atrial fibrillation (CMS/[facility])  Has not had tachycardia has not had symptoms.  Heart rate regular on exam remains on metoprolol and has not had noted recurrence of atrial fibrillation and remains off anticoagulation per cardiology    Primary hypertension  Blood pressure elevated initially but came down on second check continue valsartan hydrochlorothiazide and Toprol    Pure hypercholesterolemia  Continue Crestor 10 mg    Prediabetes  Lab Results   Component Value Date    HGBA1C 5.6 11/03/2021           Orders Placed This Encounter   Procedures    Influenza vaccine Quad Adjuvanted 65 and Older (FluAd Quad)     Comprehensive metabolic panel     Standing Status:   Future     Number of Occurrences:   1     Standing Expiration Date:   10/19/2023     Order Specific Question:   Release to patient     Answer:   Immediate    Hemoglobin A1c     Standing Status:   Future     Number of Occurrences:   1     Standing Expiration Date:   10/19/2023     Order Specific Question:   Release to patient     Answer:   Immediate         This note was created using speech recognition software. Any errors are unintentional. If you have any questions or need clarification please call.  Sukhjinder Galvez MD  10/19/2022

## 2022-10-19 NOTE — PATIENT INSTRUCTIONS
Your Personalized Prevention Plan Services (PPPS)    Preventive Services Checklist (Assumes Average Risk Unless Otherwise Noted):    Health Maintenance Topics with due status: Overdue       Topic Date Due    DTaP, Tdap, and Td Vaccines Never done    Zoster Vaccine Never done    COVID-19 Vaccine 12/27/2021    Annual Falls Risk Screening Never done    Influenza Vaccine 08/01/2022    Medicare Annual Wellness Visit 10/18/2022     Health Maintenance Topics with due status: Completed       Topic Last Completion Date    Pneumococcal (65 years and older) 11/13/2019    Hepatitis C Screening 10/22/2020     Health Maintenance Topics with due status: Aged Out       Topic Date Due    Meningococcal ACWY Aged Out    HIB Vaccines Aged Out    IPV Vaccines Aged Out    HPV Vaccines Aged Out       You May Be Eligible for These Additional Preventive Services   (Assumes Average Risk Unless Otherwise Noted)  Diabetes Screening Any 1 risk factor: hypertension, dyslipidemia, obesity, high glucose; or Any 2 risk factors: >=64yo, overweight, family history diabetes (covered every 6 months)   Hepatitis C Screening Any 1 risk factor: 1) blood transfusion before 1992,   2) current or past injection drug use (annually for high risk; if born between 9296-6681, see above for status).   Vaccine: Hepatitis B As necessary if at-risk: hemophilia, ESRD, diabetes, living with individual infected with hep B, healthcare worker with frequent contact with blood/bodily fluids (series covered once)   Sexually Transmitted Diseases (STDs) As necessary chlamydia, gonorrhea, syphilis, hepatitis B (covered annually)  HIV if any 1 risk factor present: 1) <16yo or >64yo and at increased risk or 2) 15-64yo and ask for it (covered annually)   Lung Cancer Screening Low dose chest CT if all three risk factors: 1) 50-76yo, 2) smoker or quit within last 15y, 3) >=20 pack years (covered annually).  No results found for this or any previous visit.        Cholesterol Screening No signs or symptoms of cardiovascular disease (screening covered every 5 years).     Prostate Cancer Screening >= 51yo if patient desires test after weighting potential harms and benefits (covered annually)     Abdominal Aortic Aneurysm Screening Any 1 risk factor: 1) family history of AAA or 2) 65-76yo and smoked 100+ cigarettes in a lifetime (covered once).   No results found for this or any previous visit.   No results found for this or any previous visit.           Health Risk Factors with Personalized Education:  ----------------------------------------------------------------------------------------------------------------------  Stress management:  Understanding Your Stress  · Think about how you know when youre stressed.  · Think about how your thoughts or behaviors are different when youre stressed.  · Think about what triggers stress for you.  · Think about how you handle stress, and whether youre making unhealthy choices in response to stress (like smoking, drinking alcohol or overeating).  Managing Stress  · Take a break from the stressful situation.  · Reduce your stress levels by exercising, talking with family/friends, ensuring adequate sleep.  · Consider meditation or yoga.  · Make sure you plan time to do things you enjoy.  · Let your PCP know if youre having problems limiting your stress.  ----------------------------------------------------------------------------------------------------------------------  Controlling Your Blood Pressure  · Maintain a normal weight (body mass index between 18.5 and 24.9).  · Eat more fruit, vegetables and low-fat dairy.  · Eat less saturated fat and total fat.  · Lower your sodium (salt) intake.  Try to stay under 1500 mg per day, but if you cannot get your intake to be that low, at least lower it by 1000 mg.  · Stay active.  Try to get at least 90 to 150 minutes of exercise per week.  Try brisk walking, swimming, bicycling or  dancing.  · Limit alcohol intake.  When you do consume alcohol, drink no more than 2 drinks per day.  · If you have been prescribed medication, take it regularly and exactly as prescribed.  Let your PCP know if you have any problems or questions about your medication.  · Check your blood pressure at home or at the store.  Write down your readings and share them with your PCP  ----------------------------------------------------------------------------------------------------------------------  Controlling Your Glucose (Sugar) Levels  · Stress can raise your blood sugar.  Learn what causes your stress.  Learn to lower your stress by spending time with family and friends, exercising, deep breathing, trying meditation or yoga, enjoying hobbies and getting enough rest.  Let your PCP know if youre having problems limiting your stress.  · Maintain a healthy weight by balancing your diet and exercise.  · Choose foods that are lower in calories, saturated fat, trans fat, sugar and salt.  Eat foods with more fiber, like whole grain cereals, bread, crackers, rice or pasta.  Choose to eat fruit, vegetables, and low-fat or fat-free/skim dairy.  · Reduce the portion size of your meals.  Make half of your meal vegetables and fruit, and divide the other half between lean protein and whole grains.  · Drink water instead of juice and regular soda.  · Spend at least some time being active on most days of the week.  · Work to increase your muscle strength at least twice per week.  Try things like light weights, stretch bands, yoga, heavy gardening (digging, planting with tools) or push-ups  ----------------------------------------------------------------------------------------------------------------------  Controlling Your Cholesterol  · Reduce the amount of saturated and trans fat in your diet.  Limit intake of red meat.  Consume only low-fat or non-fat/skim dairy.  Limit fried food.  Cook with vegetable oils.  · Reduce your  intake of sugary foods, sugary drinks and alcohol.  · Eat a diet high in fruit, vegetables and whole grains.  · Get protein from fish, poultry and a small portion of nuts.  · Stay active.  Try to get at least 90 to 150 minutes of exercise per week.  Try brisk walking, swimming, bicycling or dancing.  · Maintain a healthy weight by balancing your diet and exercise.  · If you have been prescribed medication, take it regularly and exactly as prescribed. Let your PCP know if you have any problems or questions about your medication.  · Its important to know your cholesterol numbers.  When recommended by your PCP, get the cholesterol blood test.  ----------------------------------------------------------------------------------------------------------------------  Reducing Your Risk of Falls  · Tell your PCP if any of your medications make you feel tired, dizzy, lightheaded or off-balance.  · Maintain coordination, flexibility and balance by ensuring regular physical activity.  · Limit alcohol intake to 1 drink per day.  Consider avoiding all alcohol intake.  · Ensure good vision.  Visit an ophthalmologist or optometrist regularly for vision screening or to make sure your glasses / contact lens prescription is correct.  If you need glasses or contacts, wear them.  When you get new glasses or contacts, take time to get used to them.  Do not wear sunglasses or tinted lenses when indoors.  · Ensure good hearing.  Have your hearing checked if you are having trouble hearing, or family and friends think you cannot hear them.  If you need a hearing aid, be sure it fits well and wear it.  · Get enough rest.  Ensure about 7-9 hours of sleep every day.  · Get up slowly from your bed or chairs.  Do not start walking until you are sure you feel steady.  · Wear non-skid, rubber-soled, low-heeled shoes.  Do not walk in socks, or in shoes and slippers with smooth soles.  · If your PCP or therapist recommends using a cane or walker, use  it regularly.  · Make your home safer.  Increase lighting throughout the house, especially at the top and bottom of stairs.  Ensure lighting is easily turned on when getting up in the middle of the night.  Make sure there are two secure rails on all stairs.  Install grab bars in the bathtub / shower and near the toilet.  Consider using a shower chair and / or a hand-held shower.  · Spread sand or salt on icy surfaces.  Beware of wet surfaces, which can be icy.  · Tell your PCP if you have fallen.

## 2022-10-19 NOTE — ASSESSMENT & PLAN NOTE
Has not had tachycardia has not had symptoms.  Heart rate regular on exam remains on metoprolol and has not had noted recurrence of atrial fibrillation and remains off anticoagulation per cardiology

## 2022-10-19 NOTE — ASSESSMENT & PLAN NOTE
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  Continue medical management.

## 2022-11-03 DIAGNOSIS — J45.40 MODERATE PERSISTENT ASTHMA, UNSPECIFIED WHETHER COMPLICATED: Primary | ICD-10-CM

## 2022-11-03 RX ORDER — FLUTICASONE PROPIONATE AND SALMETEROL 250; 50 UG/1; UG/1
1 POWDER RESPIRATORY (INHALATION) 2 TIMES DAILY
Qty: 180 EACH | Refills: 1 | Status: SHIPPED | OUTPATIENT
Start: 2022-11-03 | End: 2023-04-19 | Stop reason: SDUPTHER

## 2022-11-03 NOTE — TELEPHONE ENCOUNTER
RXREFILL  Patient LM on Refill Line  Advair Inhaler to [facility] 90 day supply    Last OV:10/19/2022  Last Labs:ordered 10/9/2022 not resulted  Pend Appt:04/19/2023    Per [facility] Hx:  fluticasone propion-salmeteroL (ADVAIR DISKUS) 250-50 mcg/dose diskus inhaler 08/02/2022  180 blister  90 Amber Sherman CRNP [facility]     Okay to Send?, Usual filled by Pulmonary

## 2022-11-24 ASSESSMENT — ENCOUNTER SYMPTOMS
DIAPHORESIS: 0
BRIEF PARALYSIS: 0
FALLS: 0
ODYNOPHAGIA: 0
FEVER: 0
BLURRED VISION: 0
HEMATOCHEZIA: 0
FOCAL WEAKNESS: 0
HEMATEMESIS: 0
POLYDIPSIA: 0
WHEEZING: 0
HEMOPTYSIS: 0
SPUTUM PRODUCTION: 0
MYALGIAS: 0
TREMORS: 0
ALTERED MENTAL STATUS: 0
ABDOMINAL PAIN: 0
HOARSE VOICE: 0
HALLUCINATIONS: 0
DYSURIA: 0

## 2022-11-24 NOTE — PROGRESS NOTES
Cardiology Office Progress Note      Saad Hillman is a 77 y.o. male  who presents with his wife for ongoing cardiovascular management.    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  Mild coronary disease.  Hypercholesterolemia, hypertension and a history of prediabetes.    Interval history:  Denies angina, exertional dyspnea, orthopnea, palpitation, syncope, near syncope, claudication, focal neurologic symptoms, or medication side effects. Less active lately mostly secondary to foot pain limiting his activity.  No cardiac symptoms with exertion.    Past medical, family, social history reviewed and there has been no significant interval change.     Past Medical History:   Diagnosis Date    Allergies     Seasonal    Arthritis     b/l shoulders- cortisone injections in 2016    Asthma     Atrial myxoma     seen on echo     BPH (benign prostatic hyperplasia)     was on meds- resolved after TURP in  2017, no meds currently     Hypertension     Lipid disorder     Medial meniscus tear     left knee- had cortisone injection in 2016    Seasonal allergies     Sleep disorder     REM sleep disorder per Dr. 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       :   Past Surgical History:   Procedure Laterality Date    [facility]    CARPAL TUNNEL RELEASE Left 01/2022    CATARACT EXTRACTION, BILATERAL Bilateral 01/2022    FINGER SURGERY      s/p traumatic amputation- had repaired     HERNIA REPAIR  2012    left inguinal hernia repair     LIPOMA RESECTION  1999    right shoulder x 2 and back of neck     MITRAL VALVE SURGERY  07/09/2019    MOHS DEFECT REPAIR  2021    Nose    NASAL POLYP EXCISION  1966- 1999    x 4     PROSTATE BIOPSY  01/08/2019    SKIN LESION EXCISION Left 2021    Melanoma on left arm    STEROID INJECTION KNEE Bilateral 10/2020    TONSILLECTOMY  1948    TRANSURETHRAL RESECTION OF PROSTATE  2017     VASECTOMY  1980's     Allergies:   Aspirin and Hydrocodone-acetaminophen    Current Outpatient Medications   Medication Sig Dispense Refill    albuterol HFA (VENTOLIN HFA) 90 mcg/actuation inhaler Inhale 2 puffs every 6 (six) hours as needed for wheezing. 18 g 1    clonazePAM 0.5 mg tablet Take 1 tablet (0.5 mg total) by mouth daily. 30 tablet 0    CYANOCOBALAMIN, VITAMIN B-12, ORAL Take 1,000 mg by mouth daily.      DULoxetine (CYMBALTA) 30 mg capsule Take 30 mg by mouth daily.      finasteride (PROSCAR) 5 mg tablet Take 1 tablet by mouth daily.      fluticasone propion-salmeteroL (ADVAIR DISKUS) 250-50 mcg/dose diskus inhaler Inhale 1 puff 2 (two) times a day. 180 each 1    fluticasone propionate (FLONASE) 50 mcg/actuation nasal spray Administer 1 spray into each nostril nightly. 32 g 3    ketoconazole (NIZORAL) 2 % shampoo       metoprolol succinate XL (TOPROL-XL) 25 mg 24 hr tablet TAKE 1 TABLET EVERY DAY 90 tablet 1    montelukast 10 mg tablet TAKE 1 TABLET EVERY NIGHT 90 tablet 3    rosuvastatin (CRESTOR) 10 mg tablet Take 1 tablet (10 mg total) by mouth daily. 90 tablet 3    valsartan-hydrochlorothiazide (DIOVAN-HCT) 80-12.5 mg per tablet TAKE 1 TABLET EVERY DAY 90 tablet 1     No current facility-administered medications for this visit.          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   HENT: Negative for hoarse voice and odynophagia.    Eyes: Negative for blurred vision and visual disturbance.   Respiratory: Negative for hemoptysis, sputum production and wheezing.    Endocrine: Negative for cold intolerance, heat intolerance and polydipsia.   Skin: Negative for rash.   Musculoskeletal: Negative for falls, muscle weakness and myalgias.   Gastrointestinal: Negative for abdominal pain, hematemesis and hematochezia.   Genitourinary: Negative for dysuria.   Neurological: Negative for brief paralysis, focal weakness and tremors.   Psychiatric/Behavioral: Negative for altered mental status and hallucinations.       Objective     Visit Vitals  /81 (BP Location: Right upper arm, Patient Position: Sitting)   Pulse 75   Ht 1.753 m (5' 9\")   Wt 85.7 kg (189 lb)   SpO2 93%   BMI 27.91 kg/m²     Wt Readings from Last 3 Encounters:   11/28/22 85.7 kg (189 lb)   10/19/22 86.6 kg (191 lb)   05/25/22 85.3 kg (188 lb)       Physical Exam  Vitals reviewed.   Constitutional:       Appearance: He is well-developed. He is not diaphoretic.   HENT:      Head: Atraumatic.   Eyes:      General: No scleral icterus.     Conjunctiva/sclera: Conjunctivae normal.   Neck:      Thyroid: No thyromegaly.      Vascular: No carotid bruit or JVD.      Trachea: No tracheal deviation.   Cardiovascular:      Rate and Rhythm: Normal rate and regular rhythm.      Heart sounds: Normal heart sounds. No murmur heard.    No friction rub. No gallop.   Pulmonary:      Effort: Pulmonary effort is normal. No respiratory distress.      Breath sounds: Normal breath sounds. No wheezing or rales.   Abdominal:      General: Bowel sounds are normal. There is no distension.      Palpations: Abdomen is soft.   Musculoskeletal:         General: No tenderness.   Skin:     General: Skin is warm and " dry.   Neurological:      Mental Status: He is alert and oriented to person, place, and time.      Cranial Nerves: No cranial nerve deficit.          Labs   Lab Results   Component Value Date    WBC 6.23 07/15/2019    HGB 11.6 (L) 07/15/2019    HCT 35.3 (L) 07/15/2019     07/15/2019    CHOL 166 08/17/2022    TRIG 86 08/17/2022    HDL 65 08/17/2022    LDLCALC 83 08/17/2022    ALT 25 08/17/2022    AST 25 08/17/2022     08/17/2022    K 4.0 08/17/2022    CL 99 08/17/2022    CREATININE 0.86 08/17/2022    BUN 12 08/17/2022    CO2 27 08/17/2022    TSH 1.26 11/15/2019    INR 1.2 07/12/2019    GLUCOSE 92 08/17/2022    HGBA1C 5.6 11/03/2021       Cardiac Imaging: No recent imaging for review..  5/2019 echo at [facility] conclusions:    1. Overall left ventricular systolic function is normal with, an EF between 60 - 65 %.   2. The right ventricular systolic function is normal.   3. The aortic valve is trileaflet and appears structurally normal.   4. There is a 1.2cm x 1.1cm papillary fibroelastoma attached to the chordae/subvalvular apparatus of the anterior mitral valve leaflet. The mitral valve structure is intact with trace mitral regurgitation.    5. Right ventricular systolic pressure is normal at < 35 mmHg.   6. No prior study for comparison.       ECG   3/3/2022 sinus rhythm with mild QRS widening and minor T wave changes.  Lead I was not available for evaluation.    Assessment/Plan     Problem List Items Addressed This Visit        High    Papillary fibroelastoma of heart    Overview     2019 echo performed for edema, mitral valve at [place]  2019 excised at [facility]         Current Assessment & Plan     He never had neurologic symptoms and still does not.  Will check an echocardiogram.  Usually these do not recur but it is possible.         Relevant Orders    Transthoracic echo (TTE) complete    Postoperative atrial fibrillation (CMS/HCC) - Primary    Overview     2019 heart surgery          Current Assessment & Plan     He has never had symptoms suggesting recurrence.  Therefore, no intervention.         Coronary artery disease involving native coronary artery of native heart without angina pectoris    Overview     6/2019 cath:  LM -  angiographically normal.   Left Anterior Descending    is moderate in size. 30% proximal-mid LAD stenosis and an 80% apical LAD stenosis.   Left Circumflex  is moderate in size.      Right Coronary Artery   The vessel was visualized by selective angiography and is moderate in size. Mild diffuse distal RCA disease.              Current Assessment & Plan     He remains asymptomatic as expected based on the last cath results but that was about 3.5 years ago.  Reviewed lifestyle and medical management.            Medium    Primary hypertension    Current Assessment & Plan     Today 130/81.  No change.         Pure hypercholesterolemia    Overview     Constipation when taking rosuvastatin daily preventing higher dosing.         Current Assessment & Plan     Last LDL in August 83.  Ideally given his coronary disease is below but he prefers to continue with the present level cover rosuvastatin 10 mg 3 to 4 days a week.         Prediabetes    Current Assessment & Plan     Healthy diet reviewed.             Orders Placed This Encounter   Procedures    Transthoracic echo (TTE) complete     Standing Status:   Future     Standing Expiration Date:   11/28/2024     Order Specific Question:   Is Definity and/or agitated saline (bubbles) indicated for the study?     Answer:   No     Order Specific Question:   Release to patient     Answer:   Immediate         Buck Macedo, DO  11/28/2022  3:07 PM    This patient note has been dictated using speech recognition software. Inadvertent speech recognition errors should be disregarded. Please do not hesitate to call my office for clarifications.

## 2022-11-28 ENCOUNTER — TELEPHONE (OUTPATIENT)
Dept: CARDIOLOGY | Facility: CLINIC | Age: 77
End: 2022-11-28

## 2022-11-28 ENCOUNTER — OFFICE VISIT (OUTPATIENT)
Dept: CARDIOLOGY | Facility: CLINIC | Age: 77
End: 2022-11-28
Payer: COMMERCIAL

## 2022-11-28 VITALS
DIASTOLIC BLOOD PRESSURE: 81 MMHG | OXYGEN SATURATION: 93 % | SYSTOLIC BLOOD PRESSURE: 130 MMHG | HEART RATE: 75 BPM | HEIGHT: 69 IN | WEIGHT: 189 LBS | BODY MASS INDEX: 27.99 KG/M2

## 2022-11-28 DIAGNOSIS — I10 PRIMARY HYPERTENSION: ICD-10-CM

## 2022-11-28 DIAGNOSIS — I48.91 POSTOPERATIVE ATRIAL FIBRILLATION (CMS/HCC): Primary | ICD-10-CM

## 2022-11-28 DIAGNOSIS — I25.10 CORONARY ARTERY DISEASE INVOLVING NATIVE CORONARY ARTERY OF NATIVE HEART WITHOUT ANGINA PECTORIS: ICD-10-CM

## 2022-11-28 DIAGNOSIS — I97.89 POSTOPERATIVE ATRIAL FIBRILLATION (CMS/HCC): Primary | ICD-10-CM

## 2022-11-28 DIAGNOSIS — R73.03 PREDIABETES: ICD-10-CM

## 2022-11-28 DIAGNOSIS — D15.1 PAPILLARY FIBROELASTOMA OF HEART: ICD-10-CM

## 2022-11-28 DIAGNOSIS — E78.00 PURE HYPERCHOLESTEROLEMIA: ICD-10-CM

## 2022-11-28 PROCEDURE — 99214 OFFICE O/P EST MOD 30 MIN: CPT | Performed by: INTERNAL MEDICINE

## 2022-11-28 PROCEDURE — 3008F BODY MASS INDEX DOCD: CPT | Performed by: INTERNAL MEDICINE

## 2022-11-28 NOTE — ASSESSMENT & PLAN NOTE
He remains asymptomatic as expected based on the last cath results but that was about 3.5 years ago.  Reviewed lifestyle and medical management.

## 2022-11-28 NOTE — TELEPHONE ENCOUNTER
Please precert for echo [id number] to be done 1/4/2023 at [facility] [phone]        Thank You: )

## 2022-11-28 NOTE — ASSESSMENT & PLAN NOTE
He never had neurologic symptoms and still does not.  Will check an echocardiogram.  Usually these do not recur but it is possible.

## 2022-11-28 NOTE — ASSESSMENT & PLAN NOTE
Last LDL in August 83.  Ideally given his coronary disease is below but he prefers to continue with the present level cover rosuvastatin 10 mg 3 to 4 days a week.

## 2022-12-11 DIAGNOSIS — I10 ESSENTIAL HYPERTENSION: ICD-10-CM

## 2022-12-12 RX ORDER — VALSARTAN AND HYDROCHLOROTHIAZIDE 80; 12.5 MG/1; MG/1
TABLET, FILM COATED ORAL
Qty: 90 TABLET | Refills: 1 | Status: SHIPPED | OUTPATIENT
Start: 2022-12-12 | End: 2023-07-19

## 2022-12-12 RX ORDER — MONTELUKAST SODIUM 10 MG/1
TABLET ORAL
Qty: 90 TABLET | Refills: 3 | Status: SHIPPED | OUTPATIENT
Start: 2022-12-12 | End: 2023-12-14

## 2023-01-04 ENCOUNTER — HOSPITAL ENCOUNTER (OUTPATIENT)
Dept: CARDIOLOGY | Facility: CLINIC | Age: 78
Discharge: HOME | End: 2023-01-04
Attending: INTERNAL MEDICINE
Payer: COMMERCIAL

## 2023-01-04 VITALS
WEIGHT: 189.6 LBS | DIASTOLIC BLOOD PRESSURE: 80 MMHG | BODY MASS INDEX: 28.08 KG/M2 | SYSTOLIC BLOOD PRESSURE: 128 MMHG | HEIGHT: 69 IN

## 2023-01-04 DIAGNOSIS — D15.1 PAPILLARY FIBROELASTOMA OF HEART: ICD-10-CM

## 2023-01-04 PROCEDURE — 93306 TTE W/DOPPLER COMPLETE: CPT | Performed by: INTERNAL MEDICINE

## 2023-01-05 LAB
AORTIC ROOT ANNULUS: 3.5 CM
ASCENDING AORTA: 3.1 CM
AV PEAK GRADIENT: 6 MMHG
AV PEAK VELOCITY-S: 1.25 M/S
AV PEAK VELOCITY-S: 1.25 M/S
BSA FOR ECHO PROCEDURE: 2.05 M2
CUSP SEPARATION: 2 CM
E WAVE DECELERATION TIME: 264 MS
E/A RATIO: 0.8
E/E' RATIO: 7.2
E/LAT E' RATIO: 7.4
EDV (BP): 92 CM3
EF (A4C): 55.4 %
EF A2C: 53.1 %
EJECTION FRACTION: 55.3 %
EST RIGHT VENT SYSTOLIC PRESSURE BY TRICUSPID REGURGITATION JET: 31 MMHG
ESTIMATED PASP: 31 MMHG
ESV (BP): 41.1 CM3
FRACTIONAL SHORTENING: 28.69 %
HEART RATE: 76 BPM
INTERVENTRICULAR SEPTUM: 0.89 CM
LA ESV (BP): 68 CM3
LA ESV INDEX (A2C): 32.78 CM3/M2
LA ESV INDEX (BP): 33.17 CM3/M2
LA/AORTA RATIO: 1.29
LAAS-AP2: 21 CM2
LAAS-AP4: 22 CM2
LAD 2D: 4.5 CM
LALD A4C: 5.39 CM
LALD A4C: 5.49 CM
LAV-S: 67.2 CM3
LEFT ATRIUM VOLUME INDEX: 33.8 CM3/M2
LEFT ATRIUM VOLUME: 69.3 CM3
LEFT INTERNAL DIMENSION IN SYSTOLE: 3.48 CM (ref 2.97–4.49)
LEFT VENTRICLE DIASTOLIC VOLUME INDEX: 45.46 CM3/M2
LEFT VENTRICLE DIASTOLIC VOLUME: 93.2 CM3
LEFT VENTRICLE SYSTOLIC VOLUME INDEX: 20.34 CM3/M2
LEFT VENTRICLE SYSTOLIC VOLUME: 41.7 CM3
LEFT VENTRICULAR INTERNAL DIMENSION IN DIASTOLE: 4.88 CM (ref 5.05–7.01)
LEFT VENTRICULAR POSTERIOR WALL IN END DIASTOLE: 0.92 CM (ref 0.65–1.21)
LV DIASTOLIC VOLUME: 88.3 CM3
LV ESV (APICAL 2 CHAMBER): 41.5 CM3
LVAD-AP2: 26.8 CM2
LVAD-AP4: 27.6 CM2
LVAS-AP2: 16.3 CM2
LVAS-AP4: 17.1 CM2
LVEDVI(A2C): 43.07 CM3/M2
LVEDVI(BP): 44.88 CM3/M2
LVESVI(A2C): 20.24 CM3/M2
LVESVI(BP): 20.05 CM3/M2
LVLD-AP2: 7.02 CM
LVLD-AP4: 6.73 CM
LVLS-AP2: 5.99 CM
LVLS-AP4: 5.85 CM
LVOT 2D: 2.1 CM
LVOT A: 3.46 CM2
LVOT PEAK VELOCITY: 1.08 M/S
LVOT PG: 5 MMHG
MV E'TISSUE VEL-LAT: 0.09 M/S
MV E'TISSUE VEL-MED: 0.1 M/S
MV PEAK A VEL: 0.86 M/S
MV PEAK E VEL: 0.69 M/S
POSTERIOR WALL: 0.92 CM
PULMONARY REGURGITATION LATE DIASTOLIC VELOCITY: 1.31 M/S
PV PEAK GRADIENT: 7 MMHG
PV PV: 1.28 M/S
RAP: 5 MMHG
RVOT VMAX: 0.55 M/S
SEPTAL TISSUE DOPPLER FREE WALL LATE DIA VELOCITY (APICAL 4 CHAMBER VIEW): 0.11 M/S
TR MAX PG: 26.42 MMHG
TRICUSPID VALVE PEAK REGURGITATION VELOCITY: 2.57 M/S
Z-SCORE OF LEFT VENTRICULAR DIMENSION IN END DIASTOLE: -1.98
Z-SCORE OF LEFT VENTRICULAR DIMENSION IN END SYSTOLE: -0.38
Z-SCORE OF LEFT VENTRICULAR POSTERIOR WALL IN END DIASTOLE: 0.18

## 2023-01-06 ENCOUNTER — TELEPHONE (OUTPATIENT)
Dept: CARDIOLOGY | Facility: CLINIC | Age: 78
End: 2023-01-06
Payer: COMMERCIAL

## 2023-02-22 DIAGNOSIS — I10 ESSENTIAL HYPERTENSION: ICD-10-CM

## 2023-02-22 RX ORDER — METOPROLOL SUCCINATE 25 MG/1
TABLET, EXTENDED RELEASE ORAL
Qty: 90 TABLET | Refills: 1 | Status: SHIPPED | OUTPATIENT
Start: 2023-02-22 | End: 2023-10-02

## 2023-04-14 LAB
ALBUMIN SERPL-MCNC: 4.3 G/DL (ref 3.6–5.1)
ALBUMIN/GLOB SERPL: 1.8 (CALC) (ref 1–2.5)
ALP SERPL-CCNC: 75 U/L (ref 35–144)
ALT SERPL-CCNC: 25 U/L (ref 9–46)
AST SERPL-CCNC: 24 U/L (ref 10–35)
BILIRUB SERPL-MCNC: 0.8 MG/DL (ref 0.2–1.2)
BUN SERPL-MCNC: 11 MG/DL (ref 7–25)
BUN/CREAT SERPL: NORMAL (CALC) (ref 6–22)
CALCIUM SERPL-MCNC: 10.1 MG/DL (ref 8.6–10.3)
CHLORIDE SERPL-SCNC: 102 MMOL/L (ref 98–110)
CO2 SERPL-SCNC: 28 MMOL/L (ref 20–32)
CREAT SERPL-MCNC: 0.78 MG/DL (ref 0.7–1.22)
EGFRCR SERPLBLD CKD-EPI 2021: 90 ML/MIN/1.73M2
GLOBULIN SER CALC-MCNC: 2.4 G/DL (CALC) (ref 1.9–3.7)
GLUCOSE SERPL-MCNC: 87 MG/DL (ref 65–99)
HBA1C MFR BLD: 5.4 % OF TOTAL HGB
POTASSIUM SERPL-SCNC: 4 MMOL/L (ref 3.5–5.3)
PROT SERPL-MCNC: 6.7 G/DL (ref 6.1–8.1)
SODIUM SERPL-SCNC: 136 MMOL/L (ref 135–146)

## 2023-04-19 ENCOUNTER — OFFICE VISIT (OUTPATIENT)
Dept: PRIMARY CARE | Facility: CLINIC | Age: 78
End: 2023-04-19
Payer: COMMERCIAL

## 2023-04-19 VITALS
RESPIRATION RATE: 16 BRPM | HEART RATE: 64 BPM | HEIGHT: 69 IN | SYSTOLIC BLOOD PRESSURE: 128 MMHG | BODY MASS INDEX: 28.44 KG/M2 | TEMPERATURE: 97.7 F | WEIGHT: 192 LBS | OXYGEN SATURATION: 95 % | DIASTOLIC BLOOD PRESSURE: 88 MMHG

## 2023-04-19 DIAGNOSIS — R73.03 PREDIABETES: Primary | ICD-10-CM

## 2023-04-19 DIAGNOSIS — I97.89 POSTOPERATIVE ATRIAL FIBRILLATION (CMS/HCC): ICD-10-CM

## 2023-04-19 DIAGNOSIS — C43.31 MALIGNANT MELANOMA OF NOSE (CMS/HCC): ICD-10-CM

## 2023-04-19 DIAGNOSIS — I48.91 POSTOPERATIVE ATRIAL FIBRILLATION (CMS/HCC): ICD-10-CM

## 2023-04-19 DIAGNOSIS — G62.9 NEUROPATHY: ICD-10-CM

## 2023-04-19 DIAGNOSIS — J45.40 MODERATE PERSISTENT ASTHMA, UNSPECIFIED WHETHER COMPLICATED: ICD-10-CM

## 2023-04-19 DIAGNOSIS — G47.52 REM SLEEP BEHAVIOR DISORDER: ICD-10-CM

## 2023-04-19 DIAGNOSIS — I10 PRIMARY HYPERTENSION: ICD-10-CM

## 2023-04-19 PROCEDURE — 3008F BODY MASS INDEX DOCD: CPT | Performed by: INTERNAL MEDICINE

## 2023-04-19 PROCEDURE — 3074F SYST BP LT 130 MM HG: CPT | Performed by: INTERNAL MEDICINE

## 2023-04-19 PROCEDURE — 3079F DIAST BP 80-89 MM HG: CPT | Performed by: INTERNAL MEDICINE

## 2023-04-19 PROCEDURE — 99214 OFFICE O/P EST MOD 30 MIN: CPT | Performed by: INTERNAL MEDICINE

## 2023-04-19 RX ORDER — FLUTICASONE PROPIONATE AND SALMETEROL 250; 50 UG/1; UG/1
1 POWDER RESPIRATORY (INHALATION) 2 TIMES DAILY
Qty: 180 EACH | Refills: 1 | Status: SHIPPED | OUTPATIENT
Start: 2023-04-19 | End: 2023-11-14

## 2023-04-19 NOTE — PROGRESS NOTES
Daily Progress Note      Subjective      Patient ID: Saad Hillman is a 78 y.o. male.  Chief Complaint   Patient presents with   • Follow-up   • Hypertension   • Hyperglycemia   • Hyperlipidemia     HPI    Daughter came in from [place] for his 78th bday  He enjoyed his time with family.  He ate a little bit more than usual.  His weight is up a little today.      Tried to do crestor 5 x week but could not tolerate  Did not like what it did to his stools. It messed up his rhythm    Made his own modification to his shoe for his 5th metatarsal pain. Thinks it helps  The following have been reviewed and updated as appropriate in this visit:   Allergies  Meds  Problems       Review of Systems  Patient Active Problem List   Diagnosis   • Asthma   • Seasonal allergies   • BPH (benign prostatic hyperplasia)   • Traumatic amputation of finger   • Arthritis   • Medial meniscus tear   • Papillary fibroelastoma of heart   • Postoperative atrial fibrillation (CMS/HCC)   • Primary hypertension   • Pure hypercholesterolemia   • Bilateral hearing loss   • Neuropathy   • Edema   • Prediabetes   • Hematuria   • Elevated PSA   • Tremor   • Bilateral carpal tunnel syndrome   • REM sleep behavior disorder   • Medicare annual wellness visit, subsequent   • Hyponatremia   • Skin lesion of back   • Urethral stricture   • Malignant melanoma of nose (CMS/HCC)   • Ulnar neuropathy at wrist, left   • Nasal polyp   • Preop general physical exam   • 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 18 g 1   • clonazePAM 0.5 mg tablet Take 1 tablet (0.5 mg total) by mouth daily. 30 tablet 0   • CYANOCOBALAMIN, VITAMIN B-12, ORAL Take 1,000 mg by mouth daily.     •  finasteride (PROSCAR) 5 mg tablet Take 1 tablet by mouth daily.     • fluticasone propion-salmeteroL (ADVAIR DISKUS) 250-50 mcg/dose diskus inhaler Inhale 1 puff 2 (two) times a day. 180 each 1   • fluticasone propionate (FLONASE) 50 mcg/actuation nasal spray Administer 1 spray into each nostril nightly. 32 g 3   • ketoconazole (NIZORAL) 2 % shampoo      • metoprolol succinate XL (TOPROL-XL) 25 mg 24 hr tablet TAKE 1 TABLET EVERY DAY 90 tablet 1   • montelukast (SINGULAIR) 10 mg tablet TAKE 1 TABLET EVERY NIGHT 90 tablet 3   • valsartan-hydrochlorothiazide (DIOVAN-HCT) 80-12.5 mg per tablet TAKE 1 TABLET EVERY DAY 90 tablet 1   • DULoxetine (CYMBALTA) 30 mg capsule Take 30 mg by mouth daily.     • rosuvastatin (CRESTOR) 10 mg tablet Take 1 tablet (10 mg total) by mouth daily. 90 tablet 3     No current facility-administered medications for this visit.     Past Medical History:   Diagnosis Date   • Allergies     Seasonal   • Arthritis     b/l shoulders- cortisone injections in 2016   • Asthma    • Atrial myxoma     seen on echo    • BPH (benign prostatic hyperplasia)     was on meds- resolved after TURP in  2017, no meds currently    • Hypertension    • Lipid disorder    • Medial meniscus tear     left knee- had cortisone injection in 2016   • Seasonal allergies    • Sleep disorder     REM sleep disorder per Dr. 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     Family History   Problem Relation Age of Onset   • Abdominal Aortic Aneurysm Biological Mother    • Hyperlipidemia Biological Mother    • Stroke Biological Father      Past Surgical History:   Procedure Laterality Date   • CARDIAC CATHETERIZATION  06/26/2019    [facility]   • CARPAL TUNNEL RELEASE Left 01/2022   • CATARACT EXTRACTION, BILATERAL Bilateral 01/2022   • FINGER SURGERY      s/p traumatic amputation- had repaired    • HERNIA REPAIR  2012    left inguinal hernia repair    • LIPOMA RESECTION  1999     right shoulder x 2 and back of neck    • MITRAL VALVE SURGERY  07/09/2019   • MOHS DEFECT REPAIR  2021    Nose   • NASAL POLYP EXCISION  1966- 1999    x 4    • PROSTATE BIOPSY  01/08/2019   • SKIN LESION EXCISION Left 2021    Melanoma on left arm   • STEROID INJECTION KNEE Bilateral 10/2020   • TONSILLECTOMY  1948   • TRANSURETHRAL RESECTION OF PROSTATE  2017   • VASECTOMY  1980's     Social History     Socioeconomic History   • Marital status:      Spouse name: Umu    • Number of children: 2   • Years of education: Not on file   • Highest education level: Not on file   Occupational History   • Not on file   Tobacco Use   • Smoking status: Never   • Smokeless tobacco: Never   Vaping Use   • Vaping status: Never Used   Substance and Sexual Activity   • Alcohol use: Yes     Comment: occasional    • Drug use: No   • Sexual activity: Defer   Other Topics Concern   • Not on file   Social History Narrative    Retired Draftsman, , 1 son, 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 has replica cobra     Social Determinants of Health     Financial Resource Strain: Low Risk  (4/22/2021)    Overall Financial Resource Strain (CARDIA)    • Difficulty of Paying Living Expenses: Not hard at all   Food Insecurity: Not on file   Transportation Needs: No Transportation Needs (4/22/2021)    PRAPARE - Transportation    • Lack of Transportation (Medical): No    • Lack of Transportation (Non-Medical): No   Physical Activity: Inactive (4/22/2021)    Exercise Vital Sign    • Days of Exercise per Week: 0 days    • Minutes of Exercise per Session: 0 min   Stress: Not on file   Social Connections: Unknown (4/22/2021)    Social Connection and Isolation Panel [NHANES]    • Frequency of Communication with Friends and Family: More than three times a week    • Frequency of Social Gatherings with Friends and Family: Not on file    • Attends [employer] Services: Not on file    • Active Member of Clubs or  Organizations: Not on file    • Attends Club or Organization Meetings: Not on file    • Marital Status: Not on file   Intimate Partner Violence: Not At Risk (4/22/2021)    Humiliation, Afraid, Rape, and Kick questionnaire    • Fear of Current or Ex-Partner: No    • Emotionally Abused: No    • Physically Abused: No    • Sexually Abused: No   Housing Stability: Not on file     Allergies   Allergen Reactions   • Aspirin Other (see comments)     Asthma attack   • Hydrocodone-Acetaminophen GI intolerance     CONSTIPATION     Vitals:    04/19/23 1401   BP: 128/88   Pulse: 64   Resp: 16   Temp: 36.5 °C (97.7 °F)   SpO2: 95%     Objective     Results for orders placed or performed during the hospital encounter of 01/04/23   Transthoracic echo (TTE) complete   Result Value Ref Range    BSA 2.05 m2    LVLd A2C 7.02 cm    LVLd A4C 6.73 cm    LVAd A2C 26.80 cm2    LVAd A4C 27.60 cm2    LV EDV (apical 2 chamber 88.30 cm3    LV Diastolic Volume 93.20 cm3    End Diastolic Volume Biplane Measurement 92.00 cm3    LVLs A2C 5.99 cm    LVLs A4C 5.85 cm    LVAs A2C 16.30 cm2    LVAs A4C 17.10 cm2    LV ESV (Apical 2 Chamber) 41.50 cm3    LV Systolic Volume 41.70 cm3    End Systolic Volume Biplane 41.10 cm3    IVS 0.89 cm    LVIDd 4.88 5.05 - 7.01 cm    LVIDs 3.48 2.97 - 4.49 cm    LVOT Diameter 2D 2.10 cm    LVOT Peak Velocity 1.08 m/s    LVOT Peak Gradient 5.00 mmHg    PW 0.92 cm    LVPWd 0.92 0.65 - 1.21 cm    MV E' Tissue Velocity Lateral 0.09 m/s    MV E' Tissue Velocity Medial 0.10 m/s    Ejection Fraction Apical 2 Chamber 53.10 %    Ejection Fraction (A4C) 55.40 %    EF 55.30 %    Tissue doppler E/ Lateral E' ratio 7.40     E/E' ratio 7.20     FS 28.69 %    LVOT Cross-section Area 3.46 cm2    BIRD A4C  5.49 cm    LALD A4C 5.39 cm    LAAs A2C 21.00 cm2    LAAs A4C 22.00 cm2    Left Atrium Systolic Volume 67.20 cm3    LA volume 69.30 cm3    Left Atrium End Systolic Volume (BP method) 68.00 cm3    LAD 2D 4.50 cm    LA/Ao Ratio 1.29      Cusp Separation 2.00 cm    AV Peak Velocity-S 1.25 m/s    AV Peak Velocity-S 1.25 m/s    AV Peak Gradient 6.00 mmHg    Ao Root Annulus 3.50 cm    Ascend Ao 3.10 cm    E wave deceleration time 264.00 ms    MV Peak A-Wave 0.86 m/s    MV Peak E-Wave 0.69 m/s    E/A ratio 0.80     OR late diastolic Velocity 1.31 m/s    PV Peak Velocity- S 1.28 m/s    PV Peak Gradient 7.00 mmHg    RVOT PV 0.55 m/s    TR Peak Velocity 2.57 m/s    TR Peak Gradient 26.42 mmHg    LV Diastolic Volume Index 45.46 cm3/m2    LV Systolic Volume Index 20.34 cm3/m2    LV Diastolic Volume Index (A2C) 43.07 cm3/m2    LV Systolic Volume Index (A2C) 20.24 cm3/m2    LV Diastolic Volume Index (BP) 44.88 cm3/m2    LV Systolic Volume  Index (BP) 20.05 cm3/m2    LA Volume Index 33.80 cm3/m2    Left Atrial End Systolic Volume Index 32.78 cm3/m2    LA ESV INDEX (BP) 33.17 cm3/m2    ZLVPWD 0.18     ZLVIDS -0.38     ZLVIDD -1.98     HR 76 bpm    S' lat velocity 0.11 m/s    RAP 5 mmHg    Est RVSP TR JET 31 mmHg    Est PASP 31.0 mmHg     Vitals:    04/19/23 1401   BP: 128/88   Pulse: 64   Resp: 16   Temp: 36.5 °C (97.7 °F)   SpO2: 95%     Vital signs reviewed  Physical Exam  Constitutional:       Appearance: He is well-developed.   HENT:      Head: Normocephalic and atraumatic.   Eyes:      Conjunctiva/sclera: Conjunctivae normal.   Cardiovascular:      Rate and Rhythm: Normal rate and regular rhythm.      Heart sounds: Normal heart sounds.   Pulmonary:      Effort: Pulmonary effort is normal. No respiratory distress.      Breath sounds: Normal breath sounds. No wheezing.   Abdominal:      General: Bowel sounds are normal. There is no distension.      Palpations: Abdomen is soft. There is no mass.      Tenderness: There is no abdominal tenderness. There is no right CVA tenderness, left CVA tenderness, guarding or rebound.      Hernia: No hernia is present.   Musculoskeletal:         General: No deformity.      Cervical back: Normal range of motion and neck  supple.      Right lower leg: No edema.      Left lower leg: No edema.   Skin:     General: Skin is warm and dry.   Neurological:      Mental Status: He is alert and oriented to person, place, and time.   Psychiatric:         Behavior: Behavior normal.             Chemistry        Component Value Date/Time     04/14/2023 0904     07/15/2019 0507    K 4.0 04/14/2023 0904    K 4.2 07/15/2019 0507     04/14/2023 0904     07/15/2019 0507    CO2 28 04/14/2023 0904    CO2 25 07/15/2019 0507    BUN 11 04/14/2023 0904    BUN 8 07/15/2019 0507    CREATININE 0.78 04/14/2023 0904    CREATININE 0.8 07/15/2019 0507        Component Value Date/Time    CALCIUM 10.1 04/14/2023 0904    CALCIUM 9.4 07/15/2019 0507    ALKPHOS 75 04/14/2023 0904    AST 24 04/14/2023 0904    ALT 25 04/14/2023 0904    BILITOT 0.8 04/14/2023 0904          Lab Results   Component Value Date    HGBA1C 5.4 04/14/2023     Lab Results   Component Value Date    TSH 1.26 11/15/2019     Lab Results   Component Value Date    WBC 6.23 07/15/2019    HGB 11.6 (L) 07/15/2019    HCT 35.3 (L) 07/15/2019    MCV 90.3 07/15/2019     07/15/2019       Lab Results   Component Value Date    LDLCALC 83 08/17/2022       Assessment and Plan   Prediabetes  A1c normal.  Encouraged him to continue healthy lifestyle.  He does a good job with his diet he is also fairly active    Neuropathy  Continue duloxetine    Primary hypertension  Blood pressure at goal continue valsartan hydrochlorothiazide    Postoperative atrial fibrillation (CMS/HCC)  Occurred in 2019 after excision of his mitral valve fibro elastoma    Asthma  Continue Advair 250    Malignant melanoma of nose (CMS/HCC)  Continue close follow-up with dermatology    REM sleep behavior disorder  Remains on clonazepam through Dr. Orellana      No orders of the defined types were placed in this encounter.        This note was created using speech recognition software. Any errors are unintentional. If  you have any questions or need clarification please call.  Sukhjinder Galvez MD  4/19/2023

## 2023-04-19 NOTE — ASSESSMENT & PLAN NOTE
A1c normal.  Encouraged him to continue healthy lifestyle.  He does a good job with his diet he is also fairly active

## 2023-06-12 ENCOUNTER — OFFICE VISIT (OUTPATIENT)
Dept: PRIMARY CARE | Facility: CLINIC | Age: 78
End: 2023-06-12
Payer: COMMERCIAL

## 2023-06-12 VITALS
OXYGEN SATURATION: 95 % | SYSTOLIC BLOOD PRESSURE: 130 MMHG | HEART RATE: 78 BPM | BODY MASS INDEX: 27.62 KG/M2 | DIASTOLIC BLOOD PRESSURE: 78 MMHG | WEIGHT: 187 LBS | TEMPERATURE: 97.3 F

## 2023-06-12 DIAGNOSIS — J01.00 ACUTE MAXILLARY SINUSITIS, RECURRENCE NOT SPECIFIED: Primary | ICD-10-CM

## 2023-06-12 PROCEDURE — 3075F SYST BP GE 130 - 139MM HG: CPT | Performed by: INTERNAL MEDICINE

## 2023-06-12 PROCEDURE — 87637 SARSCOV2&INF A&B&RSV AMP PRB: CPT | Performed by: INTERNAL MEDICINE

## 2023-06-12 PROCEDURE — 99213 OFFICE O/P EST LOW 20 MIN: CPT | Performed by: INTERNAL MEDICINE

## 2023-06-12 PROCEDURE — 3008F BODY MASS INDEX DOCD: CPT | Performed by: INTERNAL MEDICINE

## 2023-06-12 PROCEDURE — 3078F DIAST BP <80 MM HG: CPT | Performed by: INTERNAL MEDICINE

## 2023-06-12 RX ORDER — AMOXICILLIN AND CLAVULANATE POTASSIUM 875; 125 MG/1; MG/1
1 TABLET, FILM COATED ORAL 2 TIMES DAILY
Qty: 14 TABLET | Refills: 0 | Status: SHIPPED | OUTPATIENT
Start: 2023-06-12 | End: 2023-06-19

## 2023-06-12 NOTE — ASSESSMENT & PLAN NOTE
Sick for over a week with congestion sore throat cough with large amounts of thick tan-colored mucus.  Suspect sinusitis.  Augmentin.  Swab for flu and COVID sent

## 2023-06-12 NOTE — PROGRESS NOTES
Daily Progress Note      Subjective      Patient ID: Saad Hillman is a 78 y.o. male.  Chief Complaint   Patient presents with   • Sinusitis     Nasal congestion productive cough yellow phlegm and sore throat for 1 week      HPI  Congested for one week    No fever  Copious amounts of mucus.  Thick tan mucus.  Does not seem consistent with seasonal allergies to him.    The following have been reviewed and updated as appropriate in this visit:        Review of Systems  Patient Active Problem List   Diagnosis   • Asthma   • Seasonal allergies   • BPH (benign prostatic hyperplasia)   • Traumatic amputation of finger   • Arthritis   • Medial meniscus tear   • Papillary fibroelastoma of heart   • Postoperative atrial fibrillation (CMS/HCC)   • Primary hypertension   • Pure hypercholesterolemia   • Bilateral hearing loss   • Neuropathy   • Edema   • Prediabetes   • Hematuria   • Elevated PSA   • Tremor   • Bilateral carpal tunnel syndrome   • REM sleep behavior disorder   • Medicare annual wellness visit, subsequent   • Hyponatremia   • Skin lesion of back   • Urethral stricture   • Malignant melanoma of nose (CMS/HCC)   • Ulnar neuropathy at wrist, left   • Nasal polyp   • Preop general physical exam   • 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 18 g 1   • amoxicillin-pot clavulanate (AUGMENTIN) 875-125 mg per tablet Take 1 tablet by mouth 2 (two) times a day for 7 days. 14 tablet 0   • clonazePAM 0.5 mg tablet Take 1 tablet (0.5 mg total) by mouth daily. 30 tablet 0   • CYANOCOBALAMIN, VITAMIN B-12, ORAL Take 1,000 mg by mouth daily.     • DULoxetine (CYMBALTA) 30 mg capsule Take 30 mg by mouth daily.      • finasteride (PROSCAR) 5 mg tablet Take 1 tablet by mouth daily.     • fluticasone propion-salmeteroL (ADVAIR DISKUS) 250-50 mcg/dose diskus inhaler Inhale 1 puff 2 (two) times a day. 180 each 1   • fluticasone propionate (FLONASE) 50 mcg/actuation nasal spray Administer 1 spray into each nostril nightly. 32 g 3   • ketoconazole (NIZORAL) 2 % shampoo      • metoprolol succinate XL (TOPROL-XL) 25 mg 24 hr tablet TAKE 1 TABLET EVERY DAY 90 tablet 1   • montelukast (SINGULAIR) 10 mg tablet TAKE 1 TABLET EVERY NIGHT 90 tablet 3   • rosuvastatin (CRESTOR) 10 mg tablet Take 1 tablet (10 mg total) by mouth daily. 90 tablet 3   • valsartan-hydrochlorothiazide (DIOVAN-HCT) 80-12.5 mg per tablet TAKE 1 TABLET EVERY DAY 90 tablet 1     No current facility-administered medications for this visit.     Past Medical History:   Diagnosis Date   • Allergies     Seasonal   • Arthritis     b/l shoulders- cortisone injections in 2016   • Asthma    • Atrial myxoma     seen on echo    • BPH (benign prostatic hyperplasia)     was on meds- resolved after TURP in  2017, no meds currently    • Hypertension    • Lipid disorder    • Medial meniscus tear     left knee- had cortisone injection in 2016   • Seasonal allergies    • Sleep disorder     REM sleep disorder per Dr. 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     Family History   Problem Relation Age of Onset   • Abdominal Aortic Aneurysm Biological Mother    • Hyperlipidemia Biological Mother    • Stroke Biological Father      Past Surgical History:   Procedure Laterality Date   • CARDIAC CATHETERIZATION  06/26/2019    [facility]   • CARPAL TUNNEL RELEASE Left 01/2022   • CATARACT EXTRACTION, BILATERAL Bilateral 01/2022   • FINGER SURGERY      s/p traumatic amputation- had repaired    • HERNIA REPAIR  2012    left inguinal hernia repair    • LIPOMA RESECTION  1999    right shoulder x 2 and back of neck    • MITRAL VALVE SURGERY   07/09/2019   • MOHS DEFECT REPAIR  2021    Nose   • NASAL POLYP EXCISION  1966- 1999    x 4    • PROSTATE BIOPSY  01/08/2019   • SKIN LESION EXCISION Left 2021    Melanoma on left arm   • STEROID INJECTION KNEE Bilateral 10/2020   • TONSILLECTOMY  1948   • TRANSURETHRAL RESECTION OF PROSTATE  2017   • VASECTOMY  1980's     Social History     Socioeconomic History   • Marital status:      Spouse name: Umu    • Number of children: 2   • Years of education: Not on file   • Highest education level: Not on file   Occupational History   • Not on file   Tobacco Use   • Smoking status: Never   • Smokeless tobacco: Never   Vaping Use   • Vaping Use: Never used   Substance and Sexual Activity   • Alcohol use: Yes     Comment: occasional    • Drug use: No   • Sexual activity: Defer   Other Topics Concern   • Not on file   Social History Narrative    Retired Draftsman, , 1 son, 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     Social Determinants of Health     Financial Resource Strain: Low Risk  (4/22/2021)    Overall Financial Resource Strain (CARDIA)    • Difficulty of Paying Living Expenses: Not hard at all   Food Insecurity: Not on file   Transportation Needs: No Transportation Needs (4/22/2021)    PRAPARE - Transportation    • Lack of Transportation (Medical): No    • Lack of Transportation (Non-Medical): No   Physical Activity: Inactive (4/22/2021)    Exercise Vital Sign    • Days of Exercise per Week: 0 days    • Minutes of Exercise per Session: 0 min   Stress: Not on file   Social Connections: Unknown (4/22/2021)    Social Connection and Isolation Panel [NHANES]    • Frequency of Communication with Friends and Family: More than three times a week    • Frequency of Social Gatherings with Friends and Family: Not on file    • Attends Yazdanism Services: Not on file    • Active Member of Clubs or Organizations: Not on file    • Attends Club or Organization Meetings: Not on  file    • Marital Status: Not on file   Intimate Partner Violence: Not At Risk (4/22/2021)    Humiliation, Afraid, Rape, and Kick questionnaire    • Fear of Current or Ex-Partner: No    • Emotionally Abused: No    • Physically Abused: No    • Sexually Abused: No   Housing Stability: Not on file     Allergies   Allergen Reactions   • Aspirin Other (see comments)     Asthma attack  Other reaction(s): Unknown   • Hydrocodone-Acetaminophen GI intolerance     CONSTIPATION  Other reaction(s): Unknown     Vitals:    06/12/23 1305   BP: 130/78   Pulse: 78   Temp: 36.3 °C (97.3 °F)   SpO2: 95%     Objective     Results for orders placed or performed during the hospital encounter of 01/04/23   Transthoracic echo (TTE) complete   Result Value Ref Range    BSA 2.05 m2    LVLd A2C 7.02 cm    LVLd A4C 6.73 cm    LVAd A2C 26.80 cm2    LVAd A4C 27.60 cm2    LV EDV (apical 2 chamber 88.30 cm3    LV Diastolic Volume 93.20 cm3    End Diastolic Volume Biplane Measurement 92.00 cm3    LVLs A2C 5.99 cm    LVLs A4C 5.85 cm    LVAs A2C 16.30 cm2    LVAs A4C 17.10 cm2    LV ESV (Apical 2 Chamber) 41.50 cm3    LV Systolic Volume 41.70 cm3    End Systolic Volume Biplane 41.10 cm3    IVS 0.89 cm    LVIDd 4.88 5.05 - 7.01 cm    LVIDs 3.48 2.97 - 4.49 cm    LVOT Diameter 2D 2.10 cm    LVOT Peak Velocity 1.08 m/s    LVOT Peak Gradient 5.00 mmHg    PW 0.92 cm    LVPWd 0.92 0.65 - 1.21 cm    MV E' Tissue Velocity Lateral 0.09 m/s    MV E' Tissue Velocity Medial 0.10 m/s    Ejection Fraction Apical 2 Chamber 53.10 %    Ejection Fraction (A4C) 55.40 %    EF 55.30 %    Tissue doppler E/ Lateral E' ratio 7.40     E/E' ratio 7.20     FS 28.69 %    LVOT Cross-section Area 3.46 cm2    BIRD A4C  5.49 cm    LALD A4C 5.39 cm    LAAs A2C 21.00 cm2    LAAs A4C 22.00 cm2    Left Atrium Systolic Volume 67.20 cm3    LA volume 69.30 cm3    Left Atrium End Systolic Volume (BP method) 68.00 cm3    LAD 2D 4.50 cm    LA/Ao Ratio 1.29     Cusp Separation 2.00 cm    AV  Peak Velocity-S 1.25 m/s    AV Peak Velocity-S 1.25 m/s    AV Peak Gradient 6.00 mmHg    Ao Root Annulus 3.50 cm    Ascend Ao 3.10 cm    E wave deceleration time 264.00 ms    MV Peak A-Wave 0.86 m/s    MV Peak E-Wave 0.69 m/s    E/A ratio 0.80     MI late diastolic Velocity 1.31 m/s    PV Peak Velocity- S 1.28 m/s    PV Peak Gradient 7.00 mmHg    RVOT PV 0.55 m/s    TR Peak Velocity 2.57 m/s    TR Peak Gradient 26.42 mmHg    LV Diastolic Volume Index 45.46 cm3/m2    LV Systolic Volume Index 20.34 cm3/m2    LV Diastolic Volume Index (A2C) 43.07 cm3/m2    LV Systolic Volume Index (A2C) 20.24 cm3/m2    LV Diastolic Volume Index (BP) 44.88 cm3/m2    LV Systolic Volume  Index (BP) 20.05 cm3/m2    LA Volume Index 33.80 cm3/m2    Left Atrial End Systolic Volume Index 32.78 cm3/m2    LA ESV INDEX (BP) 33.17 cm3/m2    ZLVPWD 0.18     ZLVIDS -0.38     ZLVIDD -1.98     HR 76 bpm    S' lat velocity 0.11 m/s    RAP 5 mmHg    Est RVSP TR JET 31 mmHg    Est PASP 31.0 mmHg     Vitals:    06/12/23 1305   BP: 130/78   Pulse: 78   Temp: 36.3 °C (97.3 °F)   SpO2: 95%     Vital signs reviewed  Physical Exam  Constitutional:       Appearance: Normal appearance. He is well-developed.   HENT:      Head: Normocephalic and atraumatic.      Right Ear: Hearing, tympanic membrane, ear canal and external ear normal.      Left Ear: Hearing, tympanic membrane, ear canal and external ear normal.      Nose: No rhinorrhea.      Right Sinus: No maxillary sinus tenderness.      Left Sinus: No maxillary sinus tenderness.      Mouth/Throat:      Pharynx: No oropharyngeal exudate or uvula swelling.      Tonsils: No tonsillar exudate.   Eyes:      General:         Right eye: No discharge.         Left eye: No discharge.      Conjunctiva/sclera: Conjunctivae normal.      Pupils: Pupils are equal, round, and reactive to light.   Neck:      Trachea: Trachea normal.   Cardiovascular:      Rate and Rhythm: Normal rate and regular rhythm.      Heart sounds:  "Normal heart sounds.   Pulmonary:      Effort: Pulmonary effort is normal.      Breath sounds: Wheezing (Localized left upper zone) present. No rales.   Musculoskeletal:      Cervical back: Normal range of motion and neck supple.   Lymphadenopathy:      Cervical: No cervical adenopathy.   Skin:     General: Skin is warm and dry.             Chemistry        Component Value Date/Time     04/14/2023 0904     07/15/2019 0507    K 4.0 04/14/2023 0904    K 4.2 07/15/2019 0507     04/14/2023 0904     07/15/2019 0507    CO2 28 04/14/2023 0904    CO2 25 07/15/2019 0507    BUN 11 04/14/2023 0904    BUN 8 07/15/2019 0507    CREATININE 0.78 04/14/2023 0904    CREATININE 0.8 07/15/2019 0507        Component Value Date/Time    CALCIUM 10.1 04/14/2023 0904    CALCIUM 9.4 07/15/2019 0507    ALKPHOS 75 04/14/2023 0904    AST 24 04/14/2023 0904    ALT 25 04/14/2023 0904    BILITOT 0.8 04/14/2023 0904          Lab Results   Component Value Date    HGBA1C 5.4 04/14/2023     Lab Results   Component Value Date    TSH 1.26 11/15/2019     Lab Results   Component Value Date    WBC 6.23 07/15/2019    HGB 11.6 (L) 07/15/2019    HCT 35.3 (L) 07/15/2019    MCV 90.3 07/15/2019     07/15/2019       Lab Results   Component Value Date    LDLCALC 83 08/17/2022       Assessment and Plan   Acute maxillary sinusitis  Sick for over a week with congestion sore throat cough with large amounts of thick tan-colored mucus.  Suspect sinusitis.  Augmentin.  Swab for flu and COVID sent      Orders Placed This Encounter   Procedures   • COVID- 19 PCR Symptomatic (includes FLU A/B & RSV) - [facility]     Standing Status:   Future     Standing Expiration Date:   6/12/2024     Order Specific Question:   Reason for testing:     Answer:   Symptomatic     Order Specific Question:   COVID-19 PUI? (Always kianna \"yes\" regardless of resulting lab)     Answer:   Yes     Order Specific Question:   Symptomatic for COVID-19 as defined by CDC?     " Answer:   Yes     Order Specific Question:   Date of Symptom Onset     Answer:   6/5/2023     Order Specific Question:   Hospitalized for COVID-19?     Answer:   No     Order Specific Question:   Admitted to ICU for COVID-19?     Answer:   No     Order Specific Question:   Employed in healthcare setting?     Answer:   No     Order Specific Question:   Resident in a congregate (group) care setting?     Answer:   No     Order Specific Question:   Release to patient     Answer:   Immediate     Order Specific Question:   Source of Specimen(s)     Answer:   Nose [27]         This note was created using speech recognition software. Any errors are unintentional. If you have any questions or need clarification please call.  Sukhjinder Galvez MD  6/12/2023

## 2023-06-13 ENCOUNTER — TELEPHONE (OUTPATIENT)
Dept: PRIMARY CARE | Facility: CLINIC | Age: 78
End: 2023-06-13
Payer: COMMERCIAL

## 2023-06-13 LAB
FLUAV RNA SPEC QL NAA+PROBE: NEGATIVE
FLUBV RNA SPEC QL NAA+PROBE: NEGATIVE
RSV RNA SPEC QL NAA+PROBE: NEGATIVE
SARS-COV-2 RNA RESP QL NAA+PROBE: NEGATIVE

## 2023-06-13 NOTE — TELEPHONE ENCOUNTER
----- Message from Sukhjinder Galvez MD sent at 6/13/2023  8:02 AM EDT -----  Please let him know that test for COVID influenza and RSV were negative  Left message for patient to call back.

## 2023-07-19 DIAGNOSIS — I10 ESSENTIAL HYPERTENSION: ICD-10-CM

## 2023-07-19 RX ORDER — VALSARTAN AND HYDROCHLOROTHIAZIDE 80; 12.5 MG/1; MG/1
TABLET, FILM COATED ORAL
Qty: 90 TABLET | Refills: 1 | Status: SHIPPED | OUTPATIENT
Start: 2023-07-19 | End: 2024-09-03

## 2023-08-02 RX ORDER — FLUTICASONE PROPIONATE 50 MCG
SPRAY, SUSPENSION (ML) NASAL
Qty: 32 G | Refills: 3 | Status: SHIPPED | OUTPATIENT
Start: 2023-08-02 | End: 2024-07-22

## 2023-09-30 DIAGNOSIS — I10 ESSENTIAL HYPERTENSION: ICD-10-CM

## 2023-10-02 RX ORDER — METOPROLOL SUCCINATE 25 MG/1
TABLET, EXTENDED RELEASE ORAL
Qty: 90 TABLET | Refills: 1 | Status: SHIPPED | OUTPATIENT
Start: 2023-10-02 | End: 2024-12-02

## 2023-10-19 ENCOUNTER — OFFICE VISIT (OUTPATIENT)
Dept: PRIMARY CARE | Facility: CLINIC | Age: 78
End: 2023-10-19
Payer: COMMERCIAL

## 2023-10-19 VITALS
SYSTOLIC BLOOD PRESSURE: 126 MMHG | DIASTOLIC BLOOD PRESSURE: 70 MMHG | TEMPERATURE: 98.1 F | WEIGHT: 190 LBS | HEART RATE: 82 BPM | RESPIRATION RATE: 18 BRPM | OXYGEN SATURATION: 94 % | HEIGHT: 69 IN | BODY MASS INDEX: 28.14 KG/M2

## 2023-10-19 DIAGNOSIS — I10 PRIMARY HYPERTENSION: ICD-10-CM

## 2023-10-19 DIAGNOSIS — C43.62 MALIGNANT MELANOMA OF LEFT UPPER ARM (CMS/HCC): ICD-10-CM

## 2023-10-19 DIAGNOSIS — R73.03 PREDIABETES: ICD-10-CM

## 2023-10-19 DIAGNOSIS — R97.20 ELEVATED PSA: ICD-10-CM

## 2023-10-19 DIAGNOSIS — G47.52 REM SLEEP BEHAVIOR DISORDER: ICD-10-CM

## 2023-10-19 DIAGNOSIS — E78.00 PURE HYPERCHOLESTEROLEMIA: ICD-10-CM

## 2023-10-19 DIAGNOSIS — Z23 NEED FOR VACCINATION: Primary | ICD-10-CM

## 2023-10-19 PROCEDURE — G0008 ADMIN INFLUENZA VIRUS VAC: HCPCS | Performed by: INTERNAL MEDICINE

## 2023-10-19 PROCEDURE — 3078F DIAST BP <80 MM HG: CPT | Performed by: INTERNAL MEDICINE

## 2023-10-19 PROCEDURE — 99214 OFFICE O/P EST MOD 30 MIN: CPT | Mod: 25 | Performed by: INTERNAL MEDICINE

## 2023-10-19 PROCEDURE — 3074F SYST BP LT 130 MM HG: CPT | Performed by: INTERNAL MEDICINE

## 2023-10-19 PROCEDURE — 90694 VACC AIIV4 NO PRSRV 0.5ML IM: CPT | Performed by: INTERNAL MEDICINE

## 2023-10-19 PROCEDURE — 3008F BODY MASS INDEX DOCD: CPT | Performed by: INTERNAL MEDICINE

## 2023-10-19 ASSESSMENT — PATIENT HEALTH QUESTIONNAIRE - PHQ9: SUM OF ALL RESPONSES TO PHQ9 QUESTIONS 1 & 2: 0

## 2023-10-19 NOTE — PROGRESS NOTES
Daily Progress Note      Subjective      Patient ID: Saad Hillman is a 78 y.o. male.  Chief Complaint   Patient presents with    Hypertension    Hyperlipidemia    Hyperglycemia     HPI  Been feeling well.  He has been doing a lot of walking as a large field that abuts the back to his prior to being able to walk perimeter of that field.      Gained 3# over the summer    Giving up season tickets to [employer] next year.  He has been a season ticket garcia since 1962.  The following have been reviewed and updated as appropriate in this visit:        Review of Systems  Patient Active Problem List   Diagnosis    Asthma    Seasonal allergies    BPH (benign prostatic hyperplasia)    Traumatic amputation of finger    Arthritis    Medial meniscus tear    Papillary fibroelastoma of heart    Postoperative atrial fibrillation (CMS/HCC)    Primary hypertension    Pure hypercholesterolemia    Bilateral hearing loss    Neuropathy    Edema    Prediabetes    Hematuria    Elevated PSA    Tremor    Bilateral carpal tunnel syndrome    REM sleep behavior disorder    Medicare annual wellness visit, subsequent    Hyponatremia    Skin lesion of back    Urethral stricture    Malignant melanoma of nose (CMS/HCC)    Ulnar neuropathy at wrist, left    Nasal polyp    Preop general physical exam    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 18 g 1    clonazePAM 0.5 mg tablet Take 1 tablet (0.5 mg total) by mouth daily. 30 tablet 0    CYANOCOBALAMIN, VITAMIN B-12, ORAL Take 1,000 mg by mouth daily.      finasteride (PROSCAR) 5 mg tablet Take 1 tablet by mouth daily.      fluticasone  propion-salmeteroL (ADVAIR DISKUS) 250-50 mcg/dose diskus inhaler Inhale 1 puff 2 (two) times a day. 180 each 1    fluticasone propionate (FLONASE) 50 mcg/actuation nasal spray USE 1 SPRAY IN EACH NOSTRIL EVERY NIGHT 32 g 3    ketoconazole (NIZORAL) 2 % shampoo       metoprolol succinate XL (TOPROL-XL) 25 mg 24 hr tablet TAKE 1 TABLET EVERY DAY 90 tablet 1    montelukast (SINGULAIR) 10 mg tablet TAKE 1 TABLET EVERY NIGHT 90 tablet 3    valsartan-hydrochlorothiazide (DIOVAN-HCT) 80-12.5 mg per tablet TAKE 1 TABLET EVERY DAY 90 tablet 1    DULoxetine (CYMBALTA) 30 mg capsule Take 30 mg by mouth daily.      rosuvastatin (CRESTOR) 10 mg tablet Take 1 tablet (10 mg total) by mouth daily. 90 tablet 3     No current facility-administered medications for this visit.     Past Medical History:   Diagnosis Date    Allergies     Seasonal    Arthritis     b/l shoulders- cortisone injections in 2016    Asthma     Atrial myxoma     seen on echo     BPH (benign prostatic hyperplasia)     was on meds- resolved after TURP in  2017, no meds currently     Hypertension     Lipid disorder     Medial meniscus tear     left knee- had cortisone injection in 2016    Seasonal allergies     Sleep disorder     REM sleep disorder per Dr. 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     Family History   Problem Relation Age of Onset    Abdominal Aortic Aneurysm Biological Mother     Hyperlipidemia Biological Mother     Stroke Biological Father      Past Surgical History:   Procedure Laterality Date    C[facility]AC [facility]  06/26/2019    [facility]    CARPAL TUNNEL RELEASE Left 01/2022    CATARACT EXTRACTION, BILATERAL Bilateral 01/2022    FINGER SURGERY      s/p traumatic amputation- had repaired     HERNIA REPAIR  2012    left inguinal hernia repair     LIPOMA RESECTION  1999    right shoulder x 2 and back of neck     MITRAL VALVE SURGERY  07/09/2019    MOHS DEFECT  REPAIR  2021    Nose    NASAL POLYP EXCISION  1966- 1999    x 4     PROSTATE BIOPSY  01/08/2019    SKIN LESION EXCISION Left 2021    Melanoma on left arm    STEROID INJECTION KNEE Bilateral 10/2020    TONSILLECTOMY  1948    TRANSURETHRAL RESECTION OF PROSTATE  2017    VASECTOMY  1980's     Social History     Socioeconomic History    Marital status:      Spouse name: Umu     Number of children: 2    Years of education: Not on file    Highest education level: Not on file   Occupational History    Not on file   Tobacco Use    Smoking status: Never    Smokeless tobacco: Never   Vaping Use    Vaping Use: Never used   Substance and Sexual Activity    Alcohol use: Yes     Comment: occasional     Drug use: No    Sexual activity: Defer   Other Topics Concern    Not on file   Social History Narrative    Retired Draftsman, , 1 son, 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     Social Determinants of Health     Financial Resource Strain: Low Risk  (4/22/2021)    Overall Financial Resource Strain (CARDIA)     Difficulty of Paying Living Expenses: Not hard at all   Food Insecurity: Not on file   Transportation Needs: No Transportation Needs (4/22/2021)    PRAPARE - Transportation     Lack of Transportation (Medical): No     Lack of Transportation (Non-Medical): No   Physical Activity: Inactive (4/22/2021)    Exercise Vital Sign     Days of Exercise per Week: 0 days     Minutes of Exercise per Session: 0 min   Stress: Not on file   Social Connections: Unknown (4/22/2021)    Social Connection and Isolation Panel [NHANES]     Frequency of Communication with Friends and Family: More than three times a week     Frequency of Social Gatherings with Friends and Family: Not on file     Attends Congregational Services: Not on file     Active Member of Clubs or Organizations: Not on file     Attends Club or Organization Meetings: Not on file     Marital Status:  Not on file   Intimate Partner Violence: Not At Risk (4/22/2021)    Humiliation, Afraid, Rape, and Kick questionnaire     Fear of Current or Ex-Partner: No     Emotionally Abused: No     Physically Abused: No     Sexually Abused: No   Housing Stability: Not on file     Allergies   Allergen Reactions    Aspirin Other (see comments)     Asthma attack  Other reaction(s): Unknown    Hydrocodone-Acetaminophen GI intolerance     CONSTIPATION  Other reaction(s): Unknown     Vitals:    10/19/23 1055   BP: 126/70   Pulse: 82   Resp: 18   Temp: 36.7 °C (98.1 °F)   SpO2: 94%     Objective     Results for orders placed or performed in visit on 06/12/23   COVID- 19 PCR Symptomatic (includes FLU A/B & RSV) - [facility]    Specimen: Nasopharynx; Nasopharyngeal Swab   Result Value Ref Range    SARS-CoV-2 (COVID-19) Negative Negative    Influenza A Negative Negative    Influenza B Negative Negative    Respiratory Syncytial Virus Negative Negative     Vitals:    10/19/23 1055   BP: 126/70   Pulse: 82   Resp: 18   Temp: 36.7 °C (98.1 °F)   SpO2: 94%     Vital signs reviewed  Physical Exam  Constitutional:       Appearance: He is well-developed.   HENT:      Head: Normocephalic and atraumatic.   Eyes:      Conjunctiva/sclera: Conjunctivae normal.   Cardiovascular:      Rate and Rhythm: Normal rate and regular rhythm.      Heart sounds: Normal heart sounds.   Pulmonary:      Effort: Pulmonary effort is normal. No respiratory distress.      Breath sounds: Normal breath sounds. No wheezing.   Abdominal:      General: Bowel sounds are normal.      Palpations: Abdomen is soft.   Musculoskeletal:         General: No deformity.      Cervical back: Normal range of motion and neck supple.      Right lower leg: No edema.      Left lower leg: No edema.   Skin:     General: Skin is warm and dry.   Neurological:      Mental Status: He is alert and oriented to person, place, and time.   Psychiatric:         Behavior: Behavior normal.              Chemistry        Component Value Date/Time     04/14/2023 0904     07/15/2019 0507    K 4.0 04/14/2023 0904    K 4.2 07/15/2019 0507     04/14/2023 0904     07/15/2019 0507    CO2 28 04/14/2023 0904    CO2 25 07/15/2019 0507    BUN 11 04/14/2023 0904    BUN 8 07/15/2019 0507    CREATININE 0.78 04/14/2023 0904    CREATININE 0.8 07/15/2019 0507        Component Value Date/Time    CALCIUM 10.1 04/14/2023 0904    CALCIUM 9.4 07/15/2019 0507    ALKPHOS 75 04/14/2023 0904    AST 24 04/14/2023 0904    ALT 25 04/14/2023 0904    BILITOT 0.8 04/14/2023 0904          Lab Results   Component Value Date    HGBA1C 5.4 04/14/2023     Lab Results   Component Value Date    TSH 1.26 11/15/2019     Lab Results   Component Value Date    WBC 6.23 07/15/2019    HGB 11.6 (L) 07/15/2019    HCT 35.3 (L) 07/15/2019    MCV 90.3 07/15/2019     07/15/2019       Lab Results   Component Value Date    LDLCALC 83 08/17/2022       Assessment and Plan   Prediabetes  Update A1c.  He remains active walking several times a week    Pure hypercholesterolemia  Overdue for lipid testing continue Crestor    Primary hypertension  Blood pressure well controlled on valsartan hydrochlorothiazide    REM sleep behavior disorder  Gets clonazepam prescribed through Dr. 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  He follows closely with dermatology he is now on every 6-month surveillance      Orders Placed This Encounter   Procedures    Influenza vaccine Quad Adjuvanted 65 and Older (FluAd Quad)    Comprehensive metabolic panel     Standing Status:   Future     Number of Occurrences:   1     Standing Expiration Date:   10/19/2024     Order Specific Question:   Release to patient     Answer:   Immediate [1]    Lipid Prof w Refl     Standing Status:   Future     Number of Occurrences:   1     Standing  Expiration Date:   10/19/2024     Order Specific Question:   Release to patient     Answer:   Immediate [1]    Hemoglobin A1c     Standing Status:   Future     Number of Occurrences:   1     Standing Expiration Date:   10/19/2024     Order Specific Question:   Release to patient     Answer:   Immediate [1]         This note was created using speech recognition software. Any errors are unintentional. If you have any questions or need clarification please call.  Sukhjinder Galvez MD  10/19/2023

## 2023-10-19 NOTE — ASSESSMENT & PLAN NOTE
Has had melanoma of the arm and nose.  He follows closely with dermatology he is now on every 6-month surveillance

## 2023-10-26 NOTE — ASSESSMENT & PLAN NOTE
Bedside and Verbal shift change report given to Ángela Buck RN  (oncoming nurse) by Rudie Koyanagi. Braden Mistry RN  (offgoing nurse). Report included the following information Nurse Handoff Report, Index, Adult Overview, Intake/Output, MAR, Recent Results, and Med Rec Status. Occurred in 2019 after excision of his mitral valve fibro elastoma

## 2023-12-14 RX ORDER — MONTELUKAST SODIUM 10 MG/1
TABLET ORAL
Qty: 90 TABLET | Refills: 3 | Status: SHIPPED | OUTPATIENT
Start: 2023-12-14 | End: 2025-02-12

## 2024-04-02 ENCOUNTER — TRANSCRIBE ORDERS (OUTPATIENT)
Dept: SCHEDULING | Age: 79
End: 2024-04-02

## 2024-04-02 DIAGNOSIS — N40.1 BENIGN PROSTATIC HYPERPLASIA WITH LOWER URINARY TRACT SYMPTOMS: Primary | ICD-10-CM

## 2024-04-05 ENCOUNTER — HOSPITAL ENCOUNTER (OUTPATIENT)
Dept: RADIOLOGY | Age: 79
Discharge: HOME | End: 2024-04-05
Attending: UROLOGY
Payer: COMMERCIAL

## 2024-04-05 DIAGNOSIS — N40.1 BENIGN PROSTATIC HYPERPLASIA WITH LOWER URINARY TRACT SYMPTOMS: ICD-10-CM

## 2024-04-05 PROCEDURE — 76775 US EXAM ABDO BACK WALL LIM: CPT

## 2024-04-12 LAB
ALBUMIN SERPL-MCNC: 4.2 G/DL (ref 3.6–5.1)
ALBUMIN/GLOB SERPL: 1.8 (CALC) (ref 1–2.5)
ALP SERPL-CCNC: 72 U/L (ref 35–144)
ALT SERPL-CCNC: 22 U/L (ref 9–46)
AST SERPL-CCNC: 23 U/L (ref 10–35)
BILIRUB SERPL-MCNC: 0.9 MG/DL (ref 0.2–1.2)
BUN SERPL-MCNC: 10 MG/DL (ref 7–25)
BUN/CREAT SERPL: NORMAL (CALC) (ref 6–22)
CALCIUM SERPL-MCNC: 9.8 MG/DL (ref 8.6–10.3)
CHLORIDE SERPL-SCNC: 101 MMOL/L (ref 98–110)
CHOLEST SERPL-MCNC: 171 MG/DL
CHOLEST/HDLC SERPL: 2.7 (CALC)
CO2 SERPL-SCNC: 29 MMOL/L (ref 20–32)
CREAT SERPL-MCNC: 0.79 MG/DL (ref 0.7–1.22)
EGFRCR SERPLBLD CKD-EPI 2021: 89 ML/MIN/1.73M2
GLOBULIN SER CALC-MCNC: 2.3 G/DL (CALC) (ref 1.9–3.7)
GLUCOSE SERPL-MCNC: 90 MG/DL (ref 65–99)
HDLC SERPL-MCNC: 64 MG/DL
LDLC SERPL CALC-MCNC: 90 MG/DL (CALC)
NONHDLC SERPL-MCNC: 107 MG/DL (CALC)
POTASSIUM SERPL-SCNC: 4.1 MMOL/L (ref 3.5–5.3)
PROT SERPL-MCNC: 6.5 G/DL (ref 6.1–8.1)
SODIUM SERPL-SCNC: 136 MMOL/L (ref 135–146)
TRIGL SERPL-MCNC: 77 MG/DL

## 2024-04-19 ENCOUNTER — OFFICE VISIT (OUTPATIENT)
Dept: PRIMARY CARE | Facility: CLINIC | Age: 79
End: 2024-04-19
Payer: COMMERCIAL

## 2024-04-19 VITALS
RESPIRATION RATE: 14 BRPM | DIASTOLIC BLOOD PRESSURE: 78 MMHG | OXYGEN SATURATION: 95 % | HEIGHT: 69 IN | BODY MASS INDEX: 28.29 KG/M2 | SYSTOLIC BLOOD PRESSURE: 138 MMHG | WEIGHT: 191 LBS | HEART RATE: 74 BPM | TEMPERATURE: 95.9 F

## 2024-04-19 DIAGNOSIS — C43.62 MALIGNANT MELANOMA OF LEFT UPPER ARM (CMS/HCC): ICD-10-CM

## 2024-04-19 DIAGNOSIS — R73.03 PREDIABETES: ICD-10-CM

## 2024-04-19 DIAGNOSIS — N40.1 BENIGN PROSTATIC HYPERPLASIA WITH LOWER URINARY TRACT SYMPTOMS, SYMPTOM DETAILS UNSPECIFIED: Primary | ICD-10-CM

## 2024-04-19 DIAGNOSIS — I97.89 POSTOPERATIVE ATRIAL FIBRILLATION (CMS/HCC): ICD-10-CM

## 2024-04-19 DIAGNOSIS — I10 PRIMARY HYPERTENSION: ICD-10-CM

## 2024-04-19 DIAGNOSIS — M54.2 NECK PAIN: ICD-10-CM

## 2024-04-19 DIAGNOSIS — I48.91 POSTOPERATIVE ATRIAL FIBRILLATION (CMS/HCC): ICD-10-CM

## 2024-04-19 PROCEDURE — 3075F SYST BP GE 130 - 139MM HG: CPT | Performed by: INTERNAL MEDICINE

## 2024-04-19 PROCEDURE — 3078F DIAST BP <80 MM HG: CPT | Performed by: INTERNAL MEDICINE

## 2024-04-19 PROCEDURE — 99214 OFFICE O/P EST MOD 30 MIN: CPT | Performed by: INTERNAL MEDICINE

## 2024-04-19 PROCEDURE — 3008F BODY MASS INDEX DOCD: CPT | Performed by: INTERNAL MEDICINE

## 2024-04-19 RX ORDER — TADALAFIL 20 MG/1
TABLET ORAL
COMMUNITY
Start: 2024-04-03

## 2024-04-19 NOTE — ASSESSMENT & PLAN NOTE
History of TURP in 2017.  Noted to have a large postvoid residual at the urologist recently subsequent ultrasound imaging did not show hydronephrosis.  Recent creatinine was normal.  Continue close follow-up with urology

## 2024-04-19 NOTE — PROGRESS NOTES
"  Daily Progress Note      Subjective      Patient ID: Saad Hillman is a 79 y.o. male.  Chief Complaint   Patient presents with    Follow-up     Review labs. He feels that his abdomen is larger but he denies pain.     Hypertension    Hyperlipidemia    Neck Pain     His neck has been bothering him intermittently for the past 2 weeks. The pain ranges from a 1 to 5. He can \"hear the movement of bones\" when he moves his head a certain way. No known injury.      PRABHJOT Rodriguez returns today for follow-up appointment.  His wife noticed that his abdomen is little bit more protuberant than usual has not had any abdominal pain.  His bowels have been working regularly.  He did see his urologist the end of March and they noted an increased postvoid residual he subsequently had an ultrasound that showed multiple cysts in his kidneys but no hydronephrosis.  Subsequent blood testing showed that his creatinine was normal.    He has noted some pain in his neck.  He is interested in trains he found a new website that shows video of several train stations and has been watching that a lot.  Watching this he usually will have posture with his chin forward he thinks that has aggravated his neck he has noticed that when he rotates his neck he hears a grinding or clicking sound he wonders if this is normal    The following have been reviewed and updated as appropriate in this visit:        Review of Systems  Patient Active Problem List   Diagnosis    Asthma    Seasonal allergies    BPH (benign prostatic hyperplasia)    Traumatic amputation of finger    Arthritis    Medial meniscus tear    Papillary fibroelastoma of heart    Postoperative atrial fibrillation (CMS/HCC)    Primary hypertension    Pure hypercholesterolemia    Bilateral hearing loss    Neuropathy    Edema    Prediabetes    Hematuria    Elevated PSA    Tremor    Bilateral carpal tunnel syndrome    REM sleep behavior disorder    Medicare annual wellness visit, subsequent    " Hyponatremia    Skin lesion of back    Urethral stricture    Malignant melanoma of nose (CMS/HCC)    Ulnar neuropathy at wrist, left    Nasal polyp    Preop general physical exam    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 18 g 1    clonazePAM 0.5 mg tablet Take 1 tablet (0.5 mg total) by mouth daily. 30 tablet 0    CYANOCOBALAMIN, VITAMIN B-12, ORAL Take 1,000 mg by mouth daily.      finasteride (PROSCAR) 5 mg tablet Take 1 tablet by mouth daily.      fluticasone propion-salmeteroL (ADVAIR DISKUS) 250-50 mcg/dose diskus inhaler INHALE 1 PUFF TWICE DAILY 180 each 3    fluticasone propionate (FLONASE) 50 mcg/actuation nasal spray USE 1 SPRAY IN EACH NOSTRIL EVERY NIGHT 32 g 3    ketoconazole (NIZORAL) 2 % shampoo       metoprolol succinate XL (TOPROL-XL) 25 mg 24 hr tablet TAKE 1 TABLET EVERY DAY 90 tablet 1    montelukast (SINGULAIR) 10 mg tablet TAKE 1 TABLET EVERY NIGHT 90 tablet 3    rosuvastatin (CRESTOR) 10 mg tablet TAKE 1 TABLET EVERY DAY 90 tablet 3    tadalafiL, pulm. hypertension, 20 mg tablet TAKE 1 TABLET BY MOUTH EVERY DAY AS NEEDED FOR SEX      valsartan-hydrochlorothiazide (DIOVAN-HCT) 80-12.5 mg per tablet TAKE 1 TABLET EVERY DAY 90 tablet 1    DULoxetine (CYMBALTA) 30 mg capsule Take 30 mg by mouth daily.       No current facility-administered medications for this visit.     Past Medical History:   Diagnosis Date    Allergies     Seasonal    Arthritis     b/l shoulders- cortisone injections in 2016    Asthma     Atrial myxoma     seen on echo     BPH (benign prostatic hyperplasia)     was on meds- resolved after TURP in  2017, no meds currently     Hypertension     Lipid  disorder     Medial meniscus tear     left knee- had cortisone injection in 2016    Seasonal allergies     Sleep disorder     REM sleep disorder per Dr. Orellana    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     Family History   Problem Relation Age of Onset    Abdominal Aortic Aneurysm Biological Mother     Hyperlipidemia Biological Mother     Stroke Biological Father      Past Surgical History:   Procedure Laterality Date    CARDIAC CATHETERIZATION  06/26/2019    [facility]    CARPAL TUNNEL RELEASE Left 01/2022    CATARACT EXTRACTION, BILATERAL Bilateral 01/2022    FINGER SURGERY      s/p traumatic amputation- had repaired     HERNIA REPAIR  2012    left inguinal hernia repair     LIPOMA RESECTION  1999    right shoulder x 2 and back of neck     MITRAL VALVE SURGERY  07/09/2019    MOHS DEFECT REPAIR  2021    Nose    NASAL POLYP EXCISION  1966- 1999    x 4     PROSTATE BIOPSY  01/08/2019    SKIN LESION EXCISION Left 2021    Melanoma on left arm    STEROID INJECTION KNEE Bilateral 10/2020    TONSILLECTOMY  1948    TRANSURETHRAL RESECTION OF PROSTATE  2017    VASECTOMY  1980's     Social History     Socioeconomic History    Marital status:      Spouse name: Umu     Number of children: 2    Years of education: Not on file    Highest education level: Not on file   Occupational History    Not on file   Tobacco Use    Smoking status: Never    Smokeless tobacco: Never   Vaping Use    Vaping Use: Never used   Substance and Sexual Activity    Alcohol use: Yes     Comment: occasional     Drug use: No    Sexual activity: Defer   Other Topics Concern    Not on file   Social History Narrative    Retired Draftsman, , 1 son, 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 has nikitaa chip     Social Determinants of Health     Financial Resource Strain: Low Risk  (4/22/2021)    Overall Financial Resource Strain (CARDIA)     Difficulty of Paying Living Expenses:  Not hard at all   Food Insecurity: Not on file   Transportation Needs: No Transportation Needs (4/22/2021)    PRAPARE - Transportation     Lack of Transportation (Medical): No     Lack of Transportation (Non-Medical): No   Physical Activity: Inactive (4/22/2021)    Exercise Vital Sign     Days of Exercise per Week: 0 days     Minutes of Exercise per Session: 0 min   Stress: Not on file   Social Connections: Unknown (4/22/2021)    Social Connection and Isolation Panel [NHANES]     Frequency of Communication with Friends and Family: More than three times a week     Frequency of Social Gatherings with Friends and Family: Not on file     Attends [employer] Services: Not on file     Active Member of Clubs or Organizations: Not on file     Attends Club or Organization Meetings: Not on file     Marital Status: Not on file   Intimate Partner Violence: Not At Risk (4/22/2021)    Humiliation, Afraid, Rape, and Kick questionnaire     Fear of Current or Ex-Partner: No     Emotionally Abused: No     Physically Abused: No     Sexually Abused: No   Housing Stability: Not on file     Allergies   Allergen Reactions    Nsaids (Non-Steroidal Anti-Inflammatory Drug)     Aspirin Other (see comments)     Asthma attack  Other reaction(s): Unknown    Hydrocodone-Acetaminophen GI intolerance     CONSTIPATION  Other reaction(s): Unknown     Vitals:    04/19/24 1057   BP: 138/78   Pulse: 74   Resp: 14   Temp: (!) 35.5 °C (95.9 °F)   SpO2: 95%     Objective     Results for orders placed or performed in visit on 10/19/23   Comprehensive metabolic panel   Result Value Ref Range    Glucose 90 65 - 99 mg/dL    BUN 10 7 - 25 mg/dL    Creatinine 0.79 0.70 - 1.22 mg/dL    EGFR 89 > OR = 60 mL/min/1.73m2    Bun/Creatinine Ratio SEE NOTE: 6 - 22 (calc)    Sodium 136 135 - 146 mmol/L    Potassium 4.1 3.5 - 5.3 mmol/L    Chloride 101 98 - 110 mmol/L    Carbon Dioxide 29 20 - 32 mmol/L    Calcium 9.8 8.6 - 10.3 mg/dL    Protein, Total 6.5 6.1 - 8.1 g/dL     Albumin 4.2 3.6 - 5.1 g/dL    Globulin 2.3 1.9 - 3.7 g/dL (calc)    Albumin/Globulin Ratio 1.8 1.0 - 2.5 (calc)    Bilirubin, Total 0.9 0.2 - 1.2 mg/dL    Alkaline Phosphatase 72 35 - 144 U/L    Ast 23 10 - 35 U/L    Alt 22 9 - 46 U/L   Lipid Prof w Refl   Result Value Ref Range    Cholesterol, Total 171 <200 mg/dL    Hdl Cholesterol 64 > OR = 40 mg/dL    Triglycerides 77 <150 mg/dL    Ldl-Cholesterol 90 mg/dL (calc)    Chol/Hdlc Ratio 2.7 <5.0 (calc)    Non Hdl Cholesterol 107 <130 mg/dL (calc)     Vitals:    04/19/24 1057   BP: 138/78   Pulse: 74   Resp: 14   Temp: (!) 35.5 °C (95.9 °F)   SpO2: 95%     Vital signs reviewed  Physical Exam  Constitutional:       Appearance: He is well-developed.   HENT:      Head: Normocephalic and atraumatic.   Eyes:      Conjunctiva/sclera: Conjunctivae normal.   Neck:      Comments: Good range of motion of his neck.  He asked me to auscultate his neck when he ranges and I did not hear any abnormal sounds but he does feel some grinding.  Cardiovascular:      Rate and Rhythm: Normal rate and regular rhythm.      Heart sounds: Normal heart sounds.   Pulmonary:      Effort: Pulmonary effort is normal. No respiratory distress.      Breath sounds: Normal breath sounds. No wheezing.   Abdominal:      General: Bowel sounds are normal.      Palpations: Abdomen is soft.      Comments: Abdomen protuberant but soft no guarding no masses normal active bowel sounds are present   Musculoskeletal:         General: No deformity.      Cervical back: Normal range of motion and neck supple.      Right lower leg: No edema.      Left lower leg: No edema.   Skin:     General: Skin is warm and dry.   Neurological:      Mental Status: He is alert and oriented to person, place, and time.   Psychiatric:         Behavior: Behavior normal.             Chemistry        Component Value Date/Time     04/12/2024 0908     07/15/2019 0507    K 4.1 04/12/2024 0908    K 4.2 07/15/2019 0507      04/12/2024 0908     07/15/2019 0507    CO2 29 04/12/2024 0908    CO2 25 07/15/2019 0507    BUN 10 04/12/2024 0908    BUN 8 07/15/2019 0507    CREATININE 0.79 04/12/2024 0908    CREATININE 0.8 07/15/2019 0507        Component Value Date/Time    CALCIUM 9.8 04/12/2024 0908    CALCIUM 9.4 07/15/2019 0507    ALKPHOS 72 04/12/2024 0908    AST 23 04/12/2024 0908    ALT 22 04/12/2024 0908    BILITOT 0.9 04/12/2024 0908          Lab Results   Component Value Date    HGBA1C 5.4 04/14/2023     Lab Results   Component Value Date    TSH 1.26 11/15/2019     Lab Results   Component Value Date    WBC 6.23 07/15/2019    HGB 11.6 (L) 07/15/2019    HCT 35.3 (L) 07/15/2019    MCV 90.3 07/15/2019     07/15/2019       Lab Results   Component Value Date    LDLCALC 90 04/12/2024       Assessment and Plan   BPH (benign prostatic hyperplasia)  History of TURP in 2017.  Noted to have a large postvoid residual at the urologist recently subsequent ultrasound imaging did not show hydronephrosis.  Recent creatinine was normal.  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  He follows closely with dermatology he is now on every 6-month surveillance    Neck pain  Acute pain of the neck think this is exacerbated by position when he is watching videos.  I gave him an exercise to try today we will pursue a x-ray to see if there is significant underlying osteoarthritis of the exercise did not help with his pain he will let me know and we will make a referral for formalized physical therapy    Postoperative atrial fibrillation (CMS/HCC)  Occurred in 2019 after excision of his mitral valve fibro elastoma    Prediabetes  Update A1c prior to next visit    Primary hypertension  Blood pressure controlled on valsartan hydrochlorothiazide      Orders Placed This Encounter   Procedures    X-RAY CERVICAL SPINE COMPLETE 4 OR 5 VIEWS     Standing Status:   Future      Standing Expiration Date:   4/19/2025     Order Specific Question:   Release to patient     Answer:   Immediate [1]    Comprehensive metabolic panel     Standing Status:   Future     Number of Occurrences:   1     Standing Expiration Date:   4/19/2025     Order Specific Question:   Release to patient     Answer:   Immediate [1]    Hemoglobin A1c     Standing Status:   Future     Number of Occurrences:   1     Standing Expiration Date:   4/19/2025     Order Specific Question:   Release to patient     Answer:   Immediate [1]         This note was created using speech recognition software. Any errors are unintentional. If you have any questions or need clarification please call.  Sukhjinder Galvez MD  4/19/2024

## 2024-04-19 NOTE — ASSESSMENT & PLAN NOTE
Acute pain of the neck think this is exacerbated by position when he is watching videos.  I gave him an exercise to try today we will pursue a x-ray to see if there is significant underlying osteoarthritis of the exercise did not help with his pain he will let me know and we will make a referral for formalized physical therapy

## 2024-04-26 ENCOUNTER — HOSPITAL ENCOUNTER (OUTPATIENT)
Dept: RADIOLOGY | Age: 79
Discharge: HOME | End: 2024-04-26
Attending: INTERNAL MEDICINE
Payer: COMMERCIAL

## 2024-04-26 DIAGNOSIS — M54.2 NECK PAIN: ICD-10-CM

## 2024-04-26 PROCEDURE — 72050 X-RAY EXAM NECK SPINE 4/5VWS: CPT

## 2024-04-27 NOTE — RESULT ENCOUNTER NOTE
As expected the xray showed degenerative changes or arthritis in your cervical spine    You have some bone spurs and some narrowing of the openings where the nerve roots come out    If your neck pain persists , we can try physical therapy to see if that can bring some relief

## 2024-04-30 ENCOUNTER — NURSE TRIAGE (OUTPATIENT)
Dept: PRIMARY CARE | Facility: CLINIC | Age: 79
End: 2024-04-30
Payer: COMMERCIAL

## 2024-04-30 NOTE — TELEPHONE ENCOUNTER
Regarding: Sinus infection yellow discharge, post nasal drip, coughing, sneezing and runny nose  ----- Message from Aurea Maldonado sent at 4/30/2024 11:31 AM EDT -----  Patient called today with red flag complaint of Sinus infection yellow discharge, post nasal drip, coughing, sneezing and runny nose.  Call transferred to Primary Care Nurse Triage Line

## 2024-04-30 NOTE — TELEPHONE ENCOUNTER
Patient transferred to the Primary Care Telephonic Nurse Triage Line with complaints of URI    HPI:  Started a few days ago- was gardening  Thinks its started from allergies  Similar sx from few months ago  Nasal congestion and runny nose- yellow discharge  Cough- productive thick and yellow  No sore throat  HA- left side 1-5/10, intermittently  Very mild lightheadedness  No leg swelling    Denies N/V, fever, numbness/tingling  Able to eat an drink    Haven't travelled outside the country or tested for Covid    No appt available today, declined UC/Virtual    Scheduled tmrw    Disposition:  See Today or Tomorrow in Office    Care Advice:  Care Advice Given       Given By Given At Shae Soares RN 4/30/2024 11:47 AM Yes    SEE IN OFFICE TODAY OR TOMORROW:   * You need to be examined. Let me give you an appointment.   * You are scheduled with Kim Padron tomorrow 5/1/24 at 3pm.  * IF NO AVAILABLE OFFICE APPOINTMENTS: You need to be seen in an Urgent Care Center. A nearby Urgent Care Center is often a good source of care.           Patient/Caregiver understands and will follow care advice?  Yes, plans to follow advice            Reason for Disposition   Patient wants to be seen    Protocols used: Common Cold-ADULT-[place]

## 2024-05-01 ENCOUNTER — OFFICE VISIT (OUTPATIENT)
Dept: PRIMARY CARE | Facility: CLINIC | Age: 79
End: 2024-05-01
Payer: COMMERCIAL

## 2024-05-01 VITALS
HEIGHT: 69 IN | OXYGEN SATURATION: 95 % | TEMPERATURE: 97.8 F | SYSTOLIC BLOOD PRESSURE: 136 MMHG | HEART RATE: 66 BPM | DIASTOLIC BLOOD PRESSURE: 76 MMHG | BODY MASS INDEX: 28.58 KG/M2 | WEIGHT: 193 LBS

## 2024-05-01 DIAGNOSIS — J01.00 ACUTE MAXILLARY SINUSITIS, RECURRENCE NOT SPECIFIED: Primary | ICD-10-CM

## 2024-05-01 PROBLEM — D72.10 EOSINOPHILIA, UNSPECIFIED: Status: ACTIVE | Noted: 2024-05-01

## 2024-05-01 PROCEDURE — 3078F DIAST BP <80 MM HG: CPT | Performed by: NURSE PRACTITIONER

## 2024-05-01 PROCEDURE — 99213 OFFICE O/P EST LOW 20 MIN: CPT | Performed by: NURSE PRACTITIONER

## 2024-05-01 PROCEDURE — 3075F SYST BP GE 130 - 139MM HG: CPT | Performed by: NURSE PRACTITIONER

## 2024-05-01 PROCEDURE — 3008F BODY MASS INDEX DOCD: CPT | Performed by: NURSE PRACTITIONER

## 2024-05-01 RX ORDER — AMOXICILLIN AND CLAVULANATE POTASSIUM 875; 125 MG/1; MG/1
1 TABLET, FILM COATED ORAL 2 TIMES DAILY
Qty: 14 TABLET | Refills: 0 | Status: SHIPPED | OUTPATIENT
Start: 2024-05-01 | End: 2024-05-08

## 2024-05-01 ASSESSMENT — ENCOUNTER SYMPTOMS
SORE THROAT: 0
NUMBNESS: 0
ADENOPATHY: 0
SINUS PAIN: 1
WEAKNESS: 0
VOICE CHANGE: 0
CARDIOVASCULAR NEGATIVE: 1
MUSCULOSKELETAL NEGATIVE: 1
TREMORS: 0
SPEECH DIFFICULTY: 0
HEADACHES: 1
TROUBLE SWALLOWING: 0
PSYCHIATRIC NEGATIVE: 1
WHEEZING: 0
DIZZINESS: 0
SHORTNESS OF BREATH: 0
GASTROINTESTINAL NEGATIVE: 1
SINUS PRESSURE: 1
EYES NEGATIVE: 1

## 2024-05-01 NOTE — PROGRESS NOTES
Daily Progress Note      Subjective      Patient ID: Saad Hillman is a 79 y.o. male 1945  Chief Complaint   Patient presents with    URI     Started 6 days ago. Currently having stuffy, congestion in nose. Sinus pressure      Here for congestion  nasal started   6  d agp   thick yellow nasal dc      No recent travel   Had  unilateral HA   5/10  L  mild lightheadedness   this has resolved no dizziness      was working out in yard last week then sx came    Feels from allergies    No cough  has PND and clearing throat   no wheezing   Is using  singulair   advair flonase         Sx are not worsening      No chest pain short of breath  or  palpations       Pt had augmentin in March for tooth pain per Dr Galvez      PMH   hx post op Atrial fib,   asthma AR   BPH   HTN  neuropathy  prediabetes  elevated PSA  tremors   hyponatremia   melanoma nose CAD             The following have been reviewed and updated as appropriate in this visit:   Tobacco  Allergies  Meds  Med Hx  Surg Hx  Fam Hx  Soc Hx      Review of Systems   HENT:  Positive for congestion, ear pain, postnasal drip, sinus pressure and sinus pain. Negative for dental problem, ear discharge, nosebleeds, sore throat, trouble swallowing and voice change.    Eyes: Negative.    Respiratory:  Negative for shortness of breath and wheezing.    Cardiovascular: Negative.  Negative for leg swelling.   Gastrointestinal: Negative.    Musculoskeletal: Negative.    Neurological:  Positive for headaches. Negative for dizziness, tremors, syncope, speech difficulty, weakness and numbness.   Hematological:  Negative for adenopathy.   Psychiatric/Behavioral: Negative.       Patient Active Problem List   Diagnosis    Asthma    Seasonal allergies    BPH (benign prostatic hyperplasia)    Traumatic amputation of finger    Arthritis    Medial meniscus tear    Papillary fibroelastoma of heart    Postoperative atrial fibrillation (CMS/HCC)    Primary hypertension    Pure  hypercholesterolemia    Bilateral hearing loss    Neuropathy    Edema    Prediabetes    Hematuria    Elevated PSA    Tremor    Bilateral carpal tunnel syndrome    REM sleep behavior disorder    Medicare annual wellness visit, subsequent    Hyponatremia    Skin lesion of back    Urethral stricture    Malignant melanoma of nose (CMS/HCC)    Ulnar neuropathy at wrist, left    Nasal polyp    Preop general physical exam    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 18 g 1    amoxicillin-pot clavulanate (AUGMENTIN) 875-125 mg per tablet Take 1 tablet by mouth 2 (two) times a day for 7 days. 14 tablet 0    clonazePAM 0.5 mg tablet Take 1 tablet (0.5 mg total) by mouth daily. 30 tablet 0    CYANOCOBALAMIN, VITAMIN B-12, ORAL Take 1,000 mg by mouth daily.      DULoxetine (CYMBALTA) 30 mg capsule Take 30 mg by mouth daily.      finasteride (PROSCAR) 5 mg tablet Take 1 tablet by mouth daily.      fluticasone propion-salmeteroL (ADVAIR DISKUS) 250-50 mcg/dose diskus inhaler INHALE 1 PUFF TWICE DAILY 180 each 3    fluticasone propionate (FLONASE) 50 mcg/actuation nasal spray USE 1 SPRAY IN EACH NOSTRIL EVERY NIGHT 32 g 3    ketoconazole (NIZORAL) 2 % shampoo       metoprolol succinate XL (TOPROL-XL) 25 mg 24 hr tablet TAKE 1 TABLET EVERY DAY 90 tablet 1    montelukast (SINGULAIR) 10 mg tablet TAKE 1 TABLET EVERY NIGHT 90 tablet 3    rosuvastatin (CRESTOR) 10 mg tablet TAKE 1 TABLET EVERY DAY 90 tablet 3    tadalafiL, pulm. hypertension, 20 mg tablet TAKE 1 TABLET BY MOUTH EVERY DAY AS NEEDED FOR SEX      valsartan-hydrochlorothiazide (DIOVAN-HCT) 80-12.5 mg per tablet TAKE 1  TABLET EVERY DAY 90 tablet 1     No current facility-administered medications for this visit.     Past Medical History:   Diagnosis Date    Allergies     Seasonal    Arthritis     b/l shoulders- cortisone injections in 2016    Asthma     Atrial myxoma     seen on echo     BPH (benign prostatic hyperplasia)     was on meds- resolved after TURP in  2017, no meds currently     Hypertension     Lipid disorder     Medial meniscus tear     left knee- had cortisone injection in 2016    Seasonal allergies     Sleep disorder     REM sleep disorder per Dr. Orellana    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     Family History   Problem Relation Age of Onset    Abdominal Aortic Aneurysm Biological Mother     Hyperlipidemia Biological Mother     Stroke Biological Father      Past Surgical History:   Procedure Laterality Date    CARDIAC CATHETERIZATION  06/26/2019    [facility]    CARPAL TUNNEL RELEASE Left 01/2022    CATARACT EXTRACTION, BILATERAL Bilateral 01/2022    FINGER SURGERY      s/p traumatic amputation- had repaired     HERNIA REPAIR  2012    left inguinal hernia repair     LIPOMA RESECTION  1999    right shoulder x 2 and back of neck     MITRAL VALVE SURGERY  07/09/2019    MOHS DEFECT REPAIR  2021    Nose    NASAL POLYP EXCISION  1966- 1999    x 4     PROSTATE BIOPSY  01/08/2019    SKIN LESION EXCISION Left 2021    Melanoma on left arm    STEROID INJECTION KNEE Bilateral 10/2020    TONSILLECTOMY  1948    TRANSURETHRAL RESECTION OF PROSTATE  2017    VASECTOMY  1980's     Social History     Socioeconomic History    Marital status:      Spouse name: Umu     Number of children: 2    Years of education: Not on file    Highest education level: Not on file   Occupational History    Not on file   Tobacco Use    Smoking status: Never    Smokeless tobacco: Never   Vaping Use    Vaping Use: Never used   Substance and Sexual Activity    Alcohol use: Yes     Comment:  occasional     Drug use: No    Sexual activity: Defer   Other Topics Concern    Not on file   Social History Narrative    Retired Draftsman, , 1 son, 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     Social Determinants of Health     Financial Resource Strain: Low Risk  (4/22/2021)    Overall Financial Resource Strain (CARDIA)     Difficulty of Paying Living Expenses: Not hard at all   Food Insecurity: Not on file   Transportation Needs: No Transportation Needs (4/22/2021)    PRAPARE - Transportation     Lack of Transportation (Medical): No     Lack of Transportation (Non-Medical): No   Physical Activity: Inactive (4/22/2021)    Exercise Vital Sign     Days of Exercise per Week: 0 days     Minutes of Exercise per Session: 0 min   Stress: Not on file   Social Connections: Unknown (4/22/2021)    Social Connection and Isolation Panel [NHANES]     Frequency of Communication with Friends and Family: More than three times a week     Frequency of Social Gatherings with Friends and Family: Not on file     Attends Mosque Services: Not on file     Active Member of Clubs or Organizations: Not on file     Attends Club or Organization Meetings: Not on file     Marital Status: Not on file   Intimate Partner Violence: Not At Risk (4/22/2021)    Humiliation, Afraid, Rape, and Kick questionnaire     Fear of Current or Ex-Partner: No     Emotionally Abused: No     Physically Abused: No     Sexually Abused: No   Housing Stability: Not on file     Allergies   Allergen Reactions    Nsaids (Non-Steroidal Anti-Inflammatory Drug)     Aspirin Other (see comments)     Asthma attack  Other reaction(s): Unknown    Hydrocodone-Acetaminophen GI intolerance     CONSTIPATION  Other reaction(s): Unknown       Current Outpatient Medications:     albuterol HFA (VENTOLIN HFA) 90 mcg/actuation inhaler, Inhale 2 puffs every 6 (six) hours as needed for wheezing., Disp: 18 g, Rfl: 1    amoxicillin-pot clavulanate  (AUGMENTIN) 875-125 mg per tablet, Take 1 tablet by mouth 2 (two) times a day for 7 days., Disp: 14 tablet, Rfl: 0    clonazePAM 0.5 mg tablet, Take 1 tablet (0.5 mg total) by mouth daily., Disp: 30 tablet, Rfl: 0    CYANOCOBALAMIN, VITAMIN B-12, ORAL, Take 1,000 mg by mouth daily., Disp: , Rfl:     DULoxetine (CYMBALTA) 30 mg capsule, Take 30 mg by mouth daily., Disp: , Rfl:     finasteride (PROSCAR) 5 mg tablet, Take 1 tablet by mouth daily., Disp: , Rfl:     fluticasone propion-salmeteroL (ADVAIR DISKUS) 250-50 mcg/dose diskus inhaler, INHALE 1 PUFF TWICE DAILY, Disp: 180 each, Rfl: 3    fluticasone propionate (FLONASE) 50 mcg/actuation nasal spray, USE 1 SPRAY IN EACH NOSTRIL EVERY NIGHT, Disp: 32 g, Rfl: 3    ketoconazole (NIZORAL) 2 % shampoo, , Disp: , Rfl:     metoprolol succinate XL (TOPROL-XL) 25 mg 24 hr tablet, TAKE 1 TABLET EVERY DAY, Disp: 90 tablet, Rfl: 1    montelukast (SINGULAIR) 10 mg tablet, TAKE 1 TABLET EVERY NIGHT, Disp: 90 tablet, Rfl: 3    rosuvastatin (CRESTOR) 10 mg tablet, TAKE 1 TABLET EVERY DAY, Disp: 90 tablet, Rfl: 3    tadalafiL, pulm. hypertension, 20 mg tablet, TAKE 1 TABLET BY MOUTH EVERY DAY AS NEEDED FOR SEX, Disp: , Rfl:     valsartan-hydrochlorothiazide (DIOVAN-HCT) 80-12.5 mg per tablet, TAKE 1 TABLET EVERY DAY, Disp: 90 tablet, Rfl: 1  Health Maintenance   Topic Date Due    DTaP, Tdap, and Td Vaccines (1 - Tdap) Never done    Zoster Vaccine (1 of 2) Never done    RSV (60+ years old [shared decision making] or in pregnancy during 32 through 36 weeks) (1 - 1-dose 60+ series) Never done    Medicare Annual Wellness Visit  10/19/2023    COVID-19 Vaccine (5 - 2023-24 season) 12/21/2023    Depression Screening  10/19/2024    Falls Risk Screening  10/19/2024    Influenza Vaccine  Completed    Pneumococcal (65 years and older)  Completed    Meningococcal ACWY  Aged Out    RSV <20 months  Aged Out    HIB Vaccines  Aged Out    Hepatitis B Vaccines  Aged Out    IPV Vaccines  Aged Out     HPV Vaccines  Aged Out     Vitals:    05/01/24 1451   BP: 136/76   Pulse: 66   Temp: 36.6 °C (97.8 °F)   SpO2: 95%     Objective     Results for orders placed or performed in visit on 10/19/23   Comprehensive metabolic panel   Result Value Ref Range    Glucose 90 65 - 99 mg/dL    BUN 10 7 - 25 mg/dL    Creatinine 0.79 0.70 - 1.22 mg/dL    EGFR 89 > OR = 60 mL/min/1.73m2    Bun/Creatinine Ratio SEE NOTE: 6 - 22 (calc)    Sodium 136 135 - 146 mmol/L    Potassium 4.1 3.5 - 5.3 mmol/L    Chloride 101 98 - 110 mmol/L    Carbon Dioxide 29 20 - 32 mmol/L    Calcium 9.8 8.6 - 10.3 mg/dL    Protein, Total 6.5 6.1 - 8.1 g/dL    Albumin 4.2 3.6 - 5.1 g/dL    Globulin 2.3 1.9 - 3.7 g/dL (calc)    Albumin/Globulin Ratio 1.8 1.0 - 2.5 (calc)    Bilirubin, Total 0.9 0.2 - 1.2 mg/dL    Alkaline Phosphatase 72 35 - 144 U/L    Ast 23 10 - 35 U/L    Alt 22 9 - 46 U/L   Lipid Prof w Refl   Result Value Ref Range    Cholesterol, Total 171 <200 mg/dL    Hdl Cholesterol 64 > OR = 40 mg/dL    Triglycerides 77 <150 mg/dL    Ldl-Cholesterol 90 mg/dL (calc)    Chol/Hdlc Ratio 2.7 <5.0 (calc)    Non Hdl Cholesterol 107 <130 mg/dL (calc)     Vitals:    05/01/24 1451   BP: 136/76   Pulse: 66   Temp: 36.6 °C (97.8 °F)   SpO2: 95%     Body mass index is 28.5 kg/m².  BP Readings from Last 3 Encounters:   05/01/24 136/76   04/19/24 138/78   10/19/23 126/70     Wt Readings from Last 3 Encounters:   05/01/24 87.5 kg (193 lb)   04/19/24 86.6 kg (191 lb)   10/19/23 86.2 kg (190 lb)     Vital signs reviewed  Physical Exam  Vitals reviewed.   Constitutional:       Appearance: Normal appearance.   HENT:      Head: Normocephalic.      Right Ear: Tympanic membrane, ear canal and external ear normal.      Left Ear: Tympanic membrane, ear canal and external ear normal.      Nose: Congestion and rhinorrhea present.      Mouth/Throat:      Mouth: Mucous membranes are moist.      Comments:  Nasal passages inflammed and  has  thick PND  yellow     Eyes:       Extraocular Movements: Extraocular movements intact.      Conjunctiva/sclera: Conjunctivae normal.      Pupils: Pupils are equal, round, and reactive to light.   Cardiovascular:      Rate and Rhythm: Normal rate and regular rhythm.      Pulses: Normal pulses.      Heart sounds: Normal heart sounds.   Pulmonary:      Effort: Pulmonary effort is normal.      Breath sounds: Normal breath sounds. No wheezing.   Abdominal:      General: Bowel sounds are normal.      Palpations: Abdomen is soft.   Musculoskeletal:      Cervical back: Normal range of motion and neck supple.   Lymphadenopathy:      Cervical: No cervical adenopathy.   Skin:     General: Skin is warm.      Capillary Refill: Capillary refill takes less than 2 seconds.      Findings: No rash.   Neurological:      Mental Status: He is alert and oriented to person, place, and time.   Psychiatric:         Behavior: Behavior normal.             Chemistry        Component Value Date/Time     04/12/2024 0908     07/15/2019 0507    K 4.1 04/12/2024 0908    K 4.2 07/15/2019 0507     04/12/2024 0908     07/15/2019 0507    CO2 29 04/12/2024 0908    CO2 25 07/15/2019 0507    BUN 10 04/12/2024 0908    BUN 8 07/15/2019 0507    CREATININE 0.79 04/12/2024 0908    CREATININE 0.8 07/15/2019 0507        Component Value Date/Time    CALCIUM 9.8 04/12/2024 0908    CALCIUM 9.4 07/15/2019 0507    ALKPHOS 72 04/12/2024 0908    AST 23 04/12/2024 0908    ALT 22 04/12/2024 0908    BILITOT 0.9 04/12/2024 0908          Lab Results   Component Value Date    HGBA1C 5.4 04/14/2023     Lab Results   Component Value Date    TSH 1.26 11/15/2019     Lab Results   Component Value Date    WBC 6.23 07/15/2019    HGB 11.6 (L) 07/15/2019    HCT 35.3 (L) 07/15/2019    MCV 90.3 07/15/2019     07/15/2019       Lab Results   Component Value Date    LDLCALC 90 04/12/2024       Assessment and Plan   Acute maxillary sinusitis   Sx x   6  d    with congestion sore throat  thick  PND   yellow mucus.  Suspect sinusitis.  Augmentin   875 mg  twice daily x  7 d     Use flonase  nasal steroid daily  and   take  coricidin  HBP OTC  for congestion   Warm saline gargles and  mucinex  plain    600 mg   2 orally twice daily      Call if worsening short of breath  not improved  wheezing fever  cough              STEPHANIE Wolf  5/1/2024

## 2024-05-01 NOTE — ASSESSMENT & PLAN NOTE
Sx x   6  d    with congestion sore throat  thick PND   yellow mucus.  Suspect sinusitis.  Augmentin   875 mg  twice daily x  7 d     Use flonase  nasal steroid daily  and   take  coricidin  HBP OTC  for congestion   Warm saline gargles and  mucinex  plain    600 mg   2 orally twice daily      Call if worsening short of breath  not improved  wheezing fever  cough

## 2024-05-01 NOTE — PATIENT INSTRUCTIONS
Acute maxillary sinusitis   Sx x   6  d    with congestion sore throat  thick PND   yellow mucus.  Suspect sinusitis.  Augmentin   875 mg  twice daily x  7 d     Use flonase  nasal steroid daily  and   take  coricidin  HBP OTC  for congestion   Warm saline gargles and  mucinex  plain    600 mg   2 orally twice daily      Call if worsening short of breath  not improved  wheezing fever  cough

## 2024-07-22 RX ORDER — FLUTICASONE PROPIONATE 50 MCG
SPRAY, SUSPENSION (ML) NASAL
Qty: 32 G | Refills: 3 | Status: SHIPPED | OUTPATIENT
Start: 2024-07-22

## 2024-09-01 DIAGNOSIS — I10 ESSENTIAL HYPERTENSION: ICD-10-CM

## 2024-09-03 RX ORDER — VALSARTAN AND HYDROCHLOROTHIAZIDE 80; 12.5 MG/1; MG/1
TABLET, FILM COATED ORAL
Qty: 90 TABLET | Refills: 3 | Status: SHIPPED | OUTPATIENT
Start: 2024-09-03

## 2024-10-14 LAB
ALBUMIN SERPL-MCNC: 4 G/DL (ref 3.6–5.1)
ALBUMIN/GLOB SERPL: 1.6 (CALC) (ref 1–2.5)
ALP SERPL-CCNC: 72 U/L (ref 35–144)
ALT SERPL-CCNC: 21 U/L (ref 9–46)
AST SERPL-CCNC: 21 U/L (ref 10–35)
BILIRUB SERPL-MCNC: 0.8 MG/DL (ref 0.2–1.2)
BUN SERPL-MCNC: 7 MG/DL (ref 7–25)
BUN/CREAT SERPL: ABNORMAL (CALC) (ref 6–22)
CALCIUM SERPL-MCNC: 9.7 MG/DL (ref 8.6–10.3)
CHLORIDE SERPL-SCNC: 96 MMOL/L (ref 98–110)
CO2 SERPL-SCNC: 30 MMOL/L (ref 20–32)
CREAT SERPL-MCNC: 0.77 MG/DL (ref 0.7–1.22)
EGFRCR SERPLBLD CKD-EPI 2021: 90 ML/MIN/1.73M2
GLOBULIN SER CALC-MCNC: 2.5 G/DL (CALC) (ref 1.9–3.7)
GLUCOSE SERPL-MCNC: 92 MG/DL (ref 65–99)
HBA1C MFR BLD: 5.6 % OF TOTAL HGB
POTASSIUM SERPL-SCNC: 3.9 MMOL/L (ref 3.5–5.3)
PROT SERPL-MCNC: 6.5 G/DL (ref 6.1–8.1)
SODIUM SERPL-SCNC: 131 MMOL/L (ref 135–146)

## 2024-10-22 ENCOUNTER — OFFICE VISIT (OUTPATIENT)
Dept: PRIMARY CARE | Facility: CLINIC | Age: 79
End: 2024-10-22
Payer: COMMERCIAL

## 2024-10-22 VITALS
RESPIRATION RATE: 12 BRPM | WEIGHT: 185 LBS | OXYGEN SATURATION: 98 % | HEIGHT: 69 IN | TEMPERATURE: 97.2 F | SYSTOLIC BLOOD PRESSURE: 142 MMHG | DIASTOLIC BLOOD PRESSURE: 70 MMHG | BODY MASS INDEX: 27.4 KG/M2 | HEART RATE: 72 BPM

## 2024-10-22 DIAGNOSIS — M54.2 NECK PAIN: Primary | ICD-10-CM

## 2024-10-22 DIAGNOSIS — E87.1 HYPONATREMIA: ICD-10-CM

## 2024-10-22 DIAGNOSIS — R73.03 PREDIABETES: ICD-10-CM

## 2024-10-22 DIAGNOSIS — Z23 NEED FOR INFLUENZA VACCINATION: ICD-10-CM

## 2024-10-22 PROCEDURE — 3008F BODY MASS INDEX DOCD: CPT | Performed by: INTERNAL MEDICINE

## 2024-10-22 PROCEDURE — 99214 OFFICE O/P EST MOD 30 MIN: CPT | Mod: 25 | Performed by: INTERNAL MEDICINE

## 2024-10-22 PROCEDURE — 90662 IIV NO PRSV INCREASED AG IM: CPT | Performed by: INTERNAL MEDICINE

## 2024-10-22 PROCEDURE — G0008 ADMIN INFLUENZA VIRUS VAC: HCPCS | Performed by: INTERNAL MEDICINE

## 2024-10-22 PROCEDURE — 3078F DIAST BP <80 MM HG: CPT | Performed by: INTERNAL MEDICINE

## 2024-10-22 PROCEDURE — 3077F SYST BP >= 140 MM HG: CPT | Performed by: INTERNAL MEDICINE

## 2024-10-22 ASSESSMENT — PATIENT HEALTH QUESTIONNAIRE - PHQ9: SUM OF ALL RESPONSES TO PHQ9 QUESTIONS 1 & 2: 0

## 2024-10-22 NOTE — ASSESSMENT & PLAN NOTE
Persistent pain in the neck has significant degenerative disease of the spine.  Pain is all at the neck and base of skull wonder if physical therapy might help with some of this will refer

## 2024-10-22 NOTE — PROGRESS NOTES
"  Daily Progress Note      Subjective      Patient ID: Saad Hillman is a 79 y.o. male.  Chief Complaint   Patient presents with    Follow-up     Review labs    Hypertension    Hyperlipidemia    Joint pain     He has various pains in his body. He continues to have pain in his neck and states that he did not receive the results of the xray     HPI    Neck pain continues   Midline /base of skull    Occasionally feels like \"square bearings in neck\"    Otherwise has been feeling well.  He is going to have to sell his Cobra.  Umu can no longer get in and out of it easily.  He has had this car since 1986      The following have been reviewed and updated as appropriate in this visit:   Allergies  Meds  Problems       Review of Systems  Patient Active Problem List   Diagnosis    Asthma    Seasonal allergies    BPH (benign prostatic hyperplasia)    Traumatic amputation of finger    Arthritis    Medial meniscus tear    Papillary fibroelastoma of heart    Postoperative atrial fibrillation (CMS/HCC)    Primary hypertension    Pure hypercholesterolemia    Bilateral hearing loss    Neuropathy    Edema    Prediabetes    Hematuria    Elevated PSA    Tremor    Bilateral carpal tunnel syndrome    REM sleep behavior disorder    Medicare annual wellness visit, subsequent    Hyponatremia    Skin lesion of back    Urethral stricture    Malignant melanoma of nose (CMS/HCC)    Ulnar neuropathy at wrist, left    Nasal polyp    Preop general physical exam    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 30 tablet 0    CYANOCOBALAMIN, VITAMIN B-12, ORAL Take 1,000 mg by mouth daily.      finasteride (PROSCAR) 5 mg tablet Take 1 " tablet by mouth daily.      fluticasone propion-salmeteroL (ADVAIR DISKUS) 250-50 mcg/dose diskus inhaler INHALE 1 PUFF TWICE DAILY 180 each 3    fluticasone propionate (FLONASE) 50 mcg/actuation nasal spray USE 1 SPRAY IN EACH NOSTRIL EVERY NIGHT 32 g 3    ketoconazole (NIZORAL) 2 % shampoo       metoprolol succinate XL (TOPROL-XL) 25 mg 24 hr tablet TAKE 1 TABLET EVERY DAY 90 tablet 1    montelukast (SINGULAIR) 10 mg tablet TAKE 1 TABLET EVERY NIGHT 90 tablet 3    rosuvastatin (CRESTOR) 10 mg tablet TAKE 1 TABLET EVERY DAY 90 tablet 3    tadalafiL, pulm. hypertension, 20 mg tablet TAKE 1 TABLET BY MOUTH EVERY DAY AS NEEDED FOR SEX      valsartan-hydrochlorothiazide (DIOVAN-HCT) 80-12.5 mg per tablet TAKE 1 TABLET EVERY DAY 90 tablet 3    albuterol HFA (VENTOLIN HFA) 90 mcg/actuation inhaler Inhale 2 puffs every 6 (six) hours as needed for wheezing. (Patient not taking: Reported on 10/22/2024) 18 g 1    DULoxetine (CYMBALTA) 30 mg capsule Take 30 mg by mouth daily.       No current facility-administered medications for this visit.     Past Medical History:   Diagnosis Date    Allergies     Seasonal    Arthritis     b/l shoulders- cortisone injections in 2016    Asthma     Atrial myxoma     seen on echo     BPH (benign prostatic hyperplasia)     was on meds- resolved after TURP in  2017, no meds currently     Hypertension     Lipid disorder     Medial meniscus tear     left knee- had cortisone injection in 2016    Seasonal allergies     Sleep disorder     REM sleep disorder per Dr. Orellana    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     Family History   Problem Relation Name Age of Onset    Abdominal Aortic Aneurysm Biological Mother      Hyperlipidemia Biological Mother      Stroke Biological Father      Alzheimer's disease Biological Sister       Past Surgical History   Procedure Laterality Date    Cardiac catheterization  06/26/2019    [facility]    Carpal tunnel release  Left 01/2022    Cataract extraction, bilateral Bilateral 01/2022    Coronary angiography N/A 6/26/2019    [place] [facility] [place] [facility]    FIBROELASTOMA EXCISION FROM MITRAL VALVE. N/A 7/9/2019    [place] [facility] [place]    Finger surgery      s/p traumatic amputation- had repaired     Hernia repair  2012    left inguinal hernia repair     Lipoma resection  1999    right shoulder x 2 and back of neck     Mitral valve surgery  07/09/2019    Mohs defect repair  2021    Nose    Nasal polyp excision  1966- 1999    x 4     Prostate biopsy  01/08/2019    Skin lesion excision Left 2021    Melanoma on left arm    Steroid injection knee Bilateral 10/2020    Tonsillectomy  1948    Transurethral resection of prostate  2017    Vasectomy  1980's     Social History     Socioeconomic History    Marital status:      Spouse name: Umu     Number of children: 2    Years of education: Not on file    Highest education level: Not on file   Occupational History    Not on file   Tobacco Use    Smoking status: Never    Smokeless tobacco: Never   Vaping Use    Vaping status: Never Used   Substance and Sexual Activity    Alcohol use: Yes     Comment: occasional     Drug use: No    Sexual activity: Defer   Other Topics Concern    Not on file   Social History Narrative    Retired Draftsman, , 1 son, 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 has replica cobra     Social Drivers of Health     Financial Resource Strain: Low Risk  (4/22/2021)    Overall Financial Resource Strain (CARDIA)     Difficulty of Paying Living Expenses: Not hard at all   Food Insecurity: Not on file   Transportation Needs: No Transportation Needs (4/22/2021)    PRAPARE - Transportation     Lack of Transportation (Medical): No     Lack of Transportation (Non-Medical): No   Physical Activity: Inactive (4/22/2021)    Exercise Vital Sign     Days of Exercise per Week: 0 days     Minutes  of Exercise per Session: 0 min   Stress: Not on file   Social Connections: Unknown (4/22/2021)    Social Connection and Isolation Panel [NHANES]     Frequency of Communication with Friends and Family: More than three times a week     Frequency of Social Gatherings with Friends and Family: Not on file     Attends Restorationism Services: Not on file     Active Member of Clubs or Organizations: Not on file     Attends Club or Organization Meetings: Not on file     Marital Status: Not on file   Intimate Partner Violence: Not At Risk (4/22/2021)    Humiliation, Afraid, Rape, and Kick questionnaire     Fear of Current or Ex-Partner: No     Emotionally Abused: No     Physically Abused: No     Sexually Abused: No   Housing Stability: Not on file     Allergies   Allergen Reactions    Nsaids (Non-Steroidal Anti-Inflammatory Drug)     Aspirin Other (see comments)     Asthma attack  Other reaction(s): Unknown    Hydrocodone-Acetaminophen GI intolerance     CONSTIPATION  Other reaction(s): Unknown     Vitals:    10/22/24 0949   BP: (!) 142/70   Pulse: 72   Resp: 12   Temp: 36.2 °C (97.2 °F)   SpO2: 98%     Objective     Results for orders placed or performed in visit on 04/19/24   Comprehensive metabolic panel    Collection Time: 10/14/24 10:14 AM   Result Value Ref Range    Glucose 92 65 - 99 mg/dL    BUN 7 7 - 25 mg/dL    Creatinine 0.77 0.70 - 1.22 mg/dL    EGFR 90 > OR = 60 mL/min/1.73m2    Bun/Creatinine Ratio SEE NOTE: 6 - 22 (calc)    Sodium 131 (L) 135 - 146 mmol/L    Potassium 3.9 3.5 - 5.3 mmol/L    Chloride 96 (L) 98 - 110 mmol/L    Carbon Dioxide 30 20 - 32 mmol/L    Calcium 9.7 8.6 - 10.3 mg/dL    Protein, Total 6.5 6.1 - 8.1 g/dL    Albumin 4.0 3.6 - 5.1 g/dL    Globulin 2.5 1.9 - 3.7 g/dL (calc)    Albumin/Globulin Ratio 1.6 1.0 - 2.5 (calc)    Bilirubin, Total 0.8 0.2 - 1.2 mg/dL    Alkaline Phosphatase 72 35 - 144 U/L    Ast 21 10 - 35 U/L    Alt 21 9 - 46 U/L   Hemoglobin A1c    Collection Time: 10/14/24 10:14 AM    Result Value Ref Range    Hemoglobin A1C 5.6 <5.7 % of total Hgb     Vitals:    10/22/24 0949   BP: (!) 142/70   Pulse: 72   Resp: 12   Temp: 36.2 °C (97.2 °F)   SpO2: 98%     Vital signs reviewed  Physical Exam  Constitutional:       Appearance: He is well-developed.   HENT:      Head: Normocephalic and atraumatic.   Eyes:      Conjunctiva/sclera: Conjunctivae normal.   Cardiovascular:      Rate and Rhythm: Normal rate and regular rhythm.      Heart sounds: Normal heart sounds.   Pulmonary:      Effort: Pulmonary effort is normal. No respiratory distress.      Breath sounds: Normal breath sounds. No wheezing.   Abdominal:      General: Bowel sounds are normal.      Palpations: Abdomen is soft.   Musculoskeletal:         General: No deformity.      Cervical back: Normal range of motion and neck supple.      Right lower leg: No edema.      Left lower leg: No edema.   Skin:     General: Skin is warm and dry.   Neurological:      Mental Status: He is alert and oriented to person, place, and time.   Psychiatric:         Behavior: Behavior normal.             Chemistry        Component Value Date/Time     (L) 10/14/2024 1014     07/15/2019 0507    K 3.9 10/14/2024 1014    K 4.2 07/15/2019 0507    CL 96 (L) 10/14/2024 1014     07/15/2019 0507    CO2 30 10/14/2024 1014    CO2 25 07/15/2019 0507    BUN 7 10/14/2024 1014    BUN 8 07/15/2019 0507    CREATININE 0.77 10/14/2024 1014    CREATININE 0.8 07/15/2019 0507        Component Value Date/Time    CALCIUM 9.7 10/14/2024 1014    CALCIUM 9.4 07/15/2019 0507    ALKPHOS 72 10/14/2024 1014    AST 21 10/14/2024 1014    ALT 21 10/14/2024 1014    BILITOT 0.8 10/14/2024 1014          Lab Results   Component Value Date    HGBA1C 5.6 10/14/2024     Lab Results   Component Value Date    TSH 1.26 11/15/2019     Lab Results   Component Value Date    WBC 6.23 07/15/2019    HGB 11.6 (L) 07/15/2019    HCT 35.3 (L) 07/15/2019    MCV 90.3 07/15/2019     07/15/2019        Lab Results   Component Value Date    LDLCALC 90 04/12/2024       Assessment and Plan   Prediabetes  A1c at goal.  Try to remain as active as you can.  Continue healthy diet    Hyponatremia  Sodium 131.  Lower than has been while he is on duloxetine.  He is on hydrochlorothiazide.  He has not been drinking an excessive amount of water.  Anywhere from 64 to 80 ounces a day but that number has been stable.  Will repeat his labs again if the sodium continues to drop next step would be stopping the hydrochlorothiazide    Neck pain  Persistent pain in the neck has significant degenerative disease of the spine.  Pain is all at the neck and base of skull wonder if physical therapy might help with some of this will refer      Orders Placed This Encounter   Procedures    Influenza vaccine high dose trivalent 65+ IM (Fluzone High Dose)    Comprehensive metabolic panel     Standing Status:   Future     Number of Occurrences:   1     Standing Expiration Date:   10/22/2025     Order Specific Question:   Release to patient     Answer:   Immediate [1]    Hemoglobin A1c     Standing Status:   Future     Number of Occurrences:   1     Standing Expiration Date:   10/22/2025     Order Specific Question:   Release to patient     Answer:   Immediate [1]    Ambulatory referral to Physical Therapy     Standing Status:   Future     Standing Expiration Date:   10/22/2025     Referral Priority:   Routine     Referral Type:   Physical Therapy     Referral Reason:   Specialty Services Required     Number of Visits Requested:   10         This note was created using speech recognition software. Any errors are unintentional. If you have any questions or need clarification please call.  Sukhjinder Galvez MD  10/22/2024

## 2024-10-22 NOTE — ASSESSMENT & PLAN NOTE
Sodium 131.  Lower than has been while he is on duloxetine.  He is on hydrochlorothiazide.  He has not been drinking an excessive amount of water.  Anywhere from 64 to 80 ounces a day but that number has been stable.  Will repeat his labs again if the sodium continues to drop next step would be stopping the hydrochlorothiazide

## 2024-11-15 DIAGNOSIS — E78.00 PURE HYPERCHOLESTEROLEMIA: ICD-10-CM

## 2024-11-15 RX ORDER — ROSUVASTATIN CALCIUM 10 MG/1
10 TABLET, COATED ORAL DAILY
Qty: 90 TABLET | Refills: 3 | Status: SHIPPED | OUTPATIENT
Start: 2024-11-15

## 2024-11-30 DIAGNOSIS — I10 ESSENTIAL HYPERTENSION: ICD-10-CM

## 2024-11-30 DIAGNOSIS — J45.40 MODERATE PERSISTENT ASTHMA, UNSPECIFIED WHETHER COMPLICATED: ICD-10-CM

## 2024-12-02 RX ORDER — METOPROLOL SUCCINATE 25 MG/1
TABLET, EXTENDED RELEASE ORAL
Qty: 90 TABLET | Refills: 3 | Status: SHIPPED | OUTPATIENT
Start: 2024-12-02

## 2024-12-02 RX ORDER — FLUTICASONE PROPIONATE AND SALMETEROL 250; 50 UG/1; UG/1
1 POWDER RESPIRATORY (INHALATION) 2 TIMES DAILY
Qty: 180 EACH | Refills: 3 | Status: SHIPPED | OUTPATIENT
Start: 2024-12-02

## 2025-02-12 RX ORDER — MONTELUKAST SODIUM 10 MG/1
TABLET ORAL
Qty: 90 TABLET | Refills: 3 | Status: SHIPPED | OUTPATIENT
Start: 2025-02-12

## 2025-02-17 LAB
ALBUMIN SERPL-MCNC: 4.3 G/DL (ref 3.6–5.1)
ALBUMIN/GLOB SERPL: 2 (CALC) (ref 1–2.5)
ALP SERPL-CCNC: 73 U/L (ref 35–144)
ALT SERPL-CCNC: 19 U/L (ref 9–46)
AST SERPL-CCNC: 20 U/L (ref 10–35)
BILIRUB SERPL-MCNC: 0.8 MG/DL (ref 0.2–1.2)
BUN SERPL-MCNC: 12 MG/DL (ref 7–25)
BUN/CREAT SERPL: NORMAL (CALC) (ref 6–22)
CALCIUM SERPL-MCNC: 10 MG/DL (ref 8.6–10.3)
CHLORIDE SERPL-SCNC: 100 MMOL/L (ref 98–110)
CO2 SERPL-SCNC: 29 MMOL/L (ref 20–32)
CREAT SERPL-MCNC: 0.86 MG/DL (ref 0.7–1.22)
EGFRCR SERPLBLD CKD-EPI 2021: 87 ML/MIN/1.73M2
GLOBULIN SER CALC-MCNC: 2.1 G/DL (CALC) (ref 1.9–3.7)
GLUCOSE SERPL-MCNC: 95 MG/DL (ref 65–99)
HBA1C MFR BLD: 5.7 % OF TOTAL HGB
POTASSIUM SERPL-SCNC: 3.6 MMOL/L (ref 3.5–5.3)
PROT SERPL-MCNC: 6.4 G/DL (ref 6.1–8.1)
SODIUM SERPL-SCNC: 135 MMOL/L (ref 135–146)

## 2025-02-27 ENCOUNTER — OFFICE VISIT (OUTPATIENT)
Dept: PRIMARY CARE | Facility: CLINIC | Age: 80
End: 2025-02-27
Payer: COMMERCIAL

## 2025-02-27 VITALS
HEART RATE: 78 BPM | WEIGHT: 186 LBS | RESPIRATION RATE: 12 BRPM | OXYGEN SATURATION: 94 % | DIASTOLIC BLOOD PRESSURE: 60 MMHG | HEIGHT: 69 IN | SYSTOLIC BLOOD PRESSURE: 120 MMHG | TEMPERATURE: 97.1 F | BODY MASS INDEX: 27.55 KG/M2

## 2025-02-27 DIAGNOSIS — Z71.89 ACP (ADVANCE CARE PLANNING): Primary | ICD-10-CM

## 2025-02-27 DIAGNOSIS — I97.89 POSTOPERATIVE ATRIAL FIBRILLATION (CMS/HCC): ICD-10-CM

## 2025-02-27 DIAGNOSIS — I48.91 POSTOPERATIVE ATRIAL FIBRILLATION (CMS/HCC): ICD-10-CM

## 2025-02-27 DIAGNOSIS — R73.03 PREDIABETES: ICD-10-CM

## 2025-02-27 DIAGNOSIS — E78.00 PURE HYPERCHOLESTEROLEMIA: ICD-10-CM

## 2025-02-27 DIAGNOSIS — C43.62 MALIGNANT MELANOMA OF LEFT UPPER ARM (CMS/HCC): ICD-10-CM

## 2025-02-27 PROCEDURE — 99214 OFFICE O/P EST MOD 30 MIN: CPT | Performed by: INTERNAL MEDICINE

## 2025-02-27 PROCEDURE — 3078F DIAST BP <80 MM HG: CPT | Performed by: INTERNAL MEDICINE

## 2025-02-27 PROCEDURE — 3074F SYST BP LT 130 MM HG: CPT | Performed by: INTERNAL MEDICINE

## 2025-02-27 PROCEDURE — 3008F BODY MASS INDEX DOCD: CPT | Performed by: INTERNAL MEDICINE

## 2025-02-27 PROCEDURE — 1123F ACP DISCUSS/DSCN MKR DOCD: CPT | Performed by: INTERNAL MEDICINE

## 2025-02-27 RX ORDER — SOD CHLOR,BICARB/SQUEEZ BOTTLE
1 PACKET, WITH RINSE DEVICE NASAL DAILY PRN
COMMUNITY

## 2025-02-27 RX ORDER — AZELASTINE 1 MG/ML
SPRAY, METERED NASAL
COMMUNITY
Start: 2024-12-27

## 2025-02-27 NOTE — PROGRESS NOTES
Daily Progress Note      Subjective      Patient ID: Saad Hillman is a 79 y.o. male.  Chief Complaint   Patient presents with    Follow-up     He saw ENT in November and was given a nasal rinse from the compounding pharmacy    Hypertension    Hyperlipidemia    Benign Prostatic Hypertrophy     HPI  Michael returns today follow-up visit.  He has been doing well has had a lot of people he knows get sick recently.  One of his closest friends had a hip fracture.  His sister  recently.  It has been a difficult stretch for him.  He is having to sell his sports car, while upsetting to him he has come around to it as he can no longer enjoy rides as his wife cannot easily get into the car    Advance care plan discussed and documented in the medical record    The following have been reviewed and updated as appropriate in this visit:   Allergies  Meds  Problems       Review of Systems  Patient Active Problem List   Diagnosis    Asthma    Seasonal allergies    BPH (benign prostatic hyperplasia)    Traumatic amputation of finger    Arthritis    Medial meniscus tear    Papillary fibroelastoma of heart    Postoperative atrial fibrillation (CMS/HCC)    Primary hypertension    Pure hypercholesterolemia    Bilateral hearing loss    Neuropathy    Edema    Prediabetes    Hematuria    Elevated PSA    Tremor    Bilateral carpal tunnel syndrome    REM sleep behavior disorder    Medicare annual wellness visit, subsequent    Hyponatremia    Skin lesion of back    Urethral stricture    Malignant melanoma of nose (CMS/HCC)    Ulnar neuropathy at wrist, left    Nasal polyp    Preop general physical exam    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 18 g 1    azelastine (ASTELIN) 137 mcg (0.1 %) nasal spray spray 1 spray into each nostril two times daily      clonazePAM 0.5 mg tablet Take 1 tablet (0.5 mg total) by mouth daily. 30 tablet 0    CYANOCOBALAMIN, VITAMIN B-12, ORAL Take 1,000 mg by mouth daily.      finasteride (PROSCAR) 5 mg tablet Take 1 tablet by mouth daily.      fluticasone propion-salmeteroL (ADVAIR DISKUS) 250-50 mcg/dose diskus inhaler INHALE 1 PUFF TWICE DAILY 180 each 3    fluticasone propionate (FLONASE) 50 mcg/actuation nasal spray USE 1 SPRAY IN EACH NOSTRIL EVERY NIGHT 32 g 3    ketoconazole (NIZORAL) 2 % shampoo       metoprolol succinate XL (TOPROL-XL) 25 mg 24 hr tablet TAKE 1 TABLET EVERY DAY 90 tablet 3    montelukast (SINGULAIR) 10 mg tablet TAKE 1 TABLET EVERY NIGHT 90 tablet 3    rosuvastatin (CRESTOR) 10 mg tablet TAKE 1 TABLET EVERY DAY 90 tablet 3    sod chlor-bicarb-squeez bottle (NASAL WASH) packet with rinse device Administer 1 packet into each nostril daily as needed.      tadalafiL, pulm. hypertension, 20 mg tablet TAKE 1 TABLET BY MOUTH EVERY DAY AS NEEDED FOR SEX      valsartan-hydrochlorothiazide (DIOVAN-HCT) 80-12.5 mg per tablet TAKE 1 TABLET EVERY DAY 90 tablet 3    DULoxetine (CYMBALTA) 30 mg capsule Take 30 mg by mouth daily.       No current facility-administered medications for this visit.     Past Medical History:   Diagnosis Date    Allergies     Seasonal    Arthritis     b/l shoulders- cortisone injections in 2016    Asthma     Atrial myxoma     seen on echo     BPH (benign prostatic hyperplasia)     was on meds- resolved after TURP in  2017, no meds currently     Hypertension     Lipid disorder     Medial meniscus tear     left knee- had cortisone injection in 2016    Seasonal allergies     Sleep disorder     REM sleep disorder per Dr. Orellana    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 finger + nerve  damage     Family History   Problem Relation Name Age of Onset    Abdominal Aortic Aneurysm Biological Mother      Hyperlipidemia Biological Mother      Stroke Biological Father      Alzheimer's disease Biological Sister       Past Surgical History   Procedure Laterality Date    Cardiac catheterization  06/26/2019    [facility]    Carpal tunnel release Left 01/2022    Cataract extraction, bilateral Bilateral 01/2022    Right eye 12/2024    Coronary angiography N/A 6/26/2019    Performed by Doug Gardner MD at [facility] [place]    FIBROELASTOMA EXCISION FROM MITRAL VALVE. N/A 7/9/2019    Performed by José Mart MD at [facility] [place]    Finger surgery      s/p traumatic amputation- had repaired     Hernia repair  2012    left inguinal hernia repair     Lipoma resection  1999    right shoulder x 2 and back of neck     Mitral valve surgery  07/09/2019    Mohs defect repair  2021    Nose    Nasal polyp excision  1966- 1999    x 4     Prostate biopsy  01/08/2019    Skin lesion excision Left 2021    Melanoma on left arm    Steroid injection knee Bilateral 10/2020    Tonsillectomy  1948    Transurethral resection of prostate  2017    Vasectomy  1980's     Social History     Socioeconomic History    Marital status:      Spouse name: Umu     Number of children: 2    Years of education: Not on file    Highest education level: Not on file   Occupational History    Not on file   Tobacco Use    Smoking status: Never    Smokeless tobacco: Never   Vaping Use    Vaping status: Never Used   Substance and Sexual Activity    Alcohol use: Yes     Comment: occasional     Drug use: No    Sexual activity: Defer   Other Topics Concern    Not on file   Social History Narrative    Retired Draftsman, , 1 son, 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 has nik[employer] cobra     Social Drivers of Health     Financial Resource Strain: Low Risk  (4/22/2021)    Overall Financial Resource  Strain (CARDIA)     Difficulty of Paying Living Expenses: Not hard at all   Food Insecurity: Not on file   Transportation Needs: No Transportation Needs (4/22/2021)    PRAPARE - Transportation     Lack of Transportation (Medical): No     Lack of Transportation (Non-Medical): No   Physical Activity: Inactive (4/22/2021)    Exercise Vital Sign     Days of Exercise per Week: 0 days     Minutes of Exercise per Session: 0 min   Stress: Not on file   Social Connections: Unknown (4/22/2021)    Social Connection and Isolation Panel [NHANES]     Frequency of Communication with Friends and Family: More than three times a week     Frequency of Social Gatherings with Friends and Family: Not on file     Attends Temple Services: Not on file     Active Member of Clubs or Organizations: Not on file     Attends Club or Organization Meetings: Not on file     Marital Status: Not on file   Intimate Partner Violence: Not At Risk (4/22/2021)    Humiliation, Afraid, Rape, and Kick questionnaire     Fear of Current or Ex-Partner: No     Emotionally Abused: No     Physically Abused: No     Sexually Abused: No   Housing Stability: Not on file     Allergies   Allergen Reactions    Nsaids (Non-Steroidal Anti-Inflammatory Drug)     Aspirin Other (see comments)     Asthma attack  Other reaction(s): Unknown    Hydrocodone-Acetaminophen GI intolerance     CONSTIPATION  Other reaction(s): Unknown     Vitals:    02/27/25 1114   BP: 120/60   Pulse: 78   Resp: 12   Temp: 36.2 °C (97.1 °F)   SpO2: 94%     Objective     Results for orders placed or performed in visit on 10/22/24   Comprehensive metabolic panel    Collection Time: 02/17/25  8:31 AM   Result Value Ref Range    Glucose 95 65 - 99 mg/dL    BUN 12 7 - 25 mg/dL    Creatinine 0.86 0.70 - 1.22 mg/dL    EGFR 87 > OR = 60 mL/min/1.73m2    Bun/Creatinine Ratio SEE NOTE: 6 - 22 (calc)    Sodium 135 135 - 146 mmol/L    Potassium 3.6 3.5 - 5.3 mmol/L    Chloride 100 98 - 110 mmol/L    Carbon  Dioxide 29 20 - 32 mmol/L    Calcium 10.0 8.6 - 10.3 mg/dL    Protein, Total 6.4 6.1 - 8.1 g/dL    Albumin 4.3 3.6 - 5.1 g/dL    Globulin 2.1 1.9 - 3.7 g/dL (calc)    Albumin/Globulin Ratio 2.0 1.0 - 2.5 (calc)    Bilirubin, Total 0.8 0.2 - 1.2 mg/dL    Alkaline Phosphatase 73 35 - 144 U/L    Ast 20 10 - 35 U/L    Alt 19 9 - 46 U/L   Hemoglobin A1c    Collection Time: 02/17/25  8:31 AM   Result Value Ref Range    Hemoglobin A1C 5.7 (H) <5.7 % of total Hgb     Vitals:    02/27/25 1114   BP: 120/60   Pulse: 78   Resp: 12   Temp: 36.2 °C (97.1 °F)   SpO2: 94%     Vital signs reviewed  Physical Exam  Constitutional:       Appearance: He is well-developed.   HENT:      Head: Normocephalic and atraumatic.   Eyes:      Conjunctiva/sclera: Conjunctivae normal.   Cardiovascular:      Rate and Rhythm: Normal rate and regular rhythm.      Heart sounds: Normal heart sounds.   Pulmonary:      Effort: Pulmonary effort is normal. No respiratory distress.      Breath sounds: Normal breath sounds. No wheezing.   Abdominal:      General: Bowel sounds are normal.      Palpations: Abdomen is soft.   Musculoskeletal:         General: No deformity.      Cervical back: Normal range of motion and neck supple.      Right lower leg: No edema.      Left lower leg: No edema.   Skin:     General: Skin is warm and dry.   Neurological:      Mental Status: He is alert and oriented to person, place, and time.   Psychiatric:         Behavior: Behavior normal.             Chemistry        Component Value Date/Time     02/17/2025 0831     07/15/2019 0507    K 3.6 02/17/2025 0831    K 4.2 07/15/2019 0507     02/17/2025 0831     07/15/2019 0507    CO2 29 02/17/2025 0831    CO2 25 07/15/2019 0507    BUN 12 02/17/2025 0831    BUN 8 07/15/2019 0507    CREATININE 0.86 02/17/2025 0831    CREATININE 0.8 07/15/2019 0507        Component Value Date/Time    CALCIUM 10.0 02/17/2025 0831    CALCIUM 9.4 07/15/2019 0507    ALKPHOS 73  02/17/2025 0831    AST 20 02/17/2025 0831    ALT 19 02/17/2025 0831    BILITOT 0.8 02/17/2025 0831          Lab Results   Component Value Date    HGBA1C 5.7 (H) 02/17/2025     Lab Results   Component Value Date    TSH 1.26 11/15/2019     Lab Results   Component Value Date    WBC 6.23 07/15/2019    HGB 11.6 (L) 07/15/2019    HCT 35.3 (L) 07/15/2019    MCV 90.3 07/15/2019     07/15/2019       Lab Results   Component Value Date    LDLCALC 90 04/12/2024       Assessment and Plan   ACP (advance care planning)  Medical poa yakov, #2 children  Full code,. 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  He follows closely with dermatology he is now on every 6-month surveillance      Orders Placed This Encounter   Procedures    Hemoglobin A1c     Standing Status:   Future     Number of Occurrences:   1     Standing Expiration Date:   2/27/2026     Order Specific Question:   Release to patient     Answer:   Immediate [1]    Lipid Prof w Refl     Standing Status:   Future     Number of Occurrences:   1     Standing Expiration Date:   2/28/2026     Order Specific Question:   Release to patient     Answer:   Immediate [1]    Comprehensive metabolic panel     Standing Status:   Future     Number of Occurrences:   1     Standing Expiration Date:   2/28/2026     Order Specific Question:   Release to patient     Answer:   Immediate [1]         This note was created using speech recognition software. Any errors are unintentional. If you have any questions or need clarification please call.  Sukhjinder Galvez MD  2/27/2025

## 2025-05-23 ENCOUNTER — TRANSCRIBE ORDERS (OUTPATIENT)
Dept: SCHEDULING | Age: 80
End: 2025-05-23

## 2025-05-23 DIAGNOSIS — N13.8 ENLARGED PROSTATE WITH URINARY OBSTRUCTION: Primary | ICD-10-CM

## 2025-05-23 DIAGNOSIS — N40.1 ENLARGED PROSTATE WITH URINARY OBSTRUCTION: Primary | ICD-10-CM

## 2025-06-02 ENCOUNTER — HOSPITAL ENCOUNTER (OUTPATIENT)
Dept: RADIOLOGY | Age: 80
Discharge: HOME | End: 2025-06-02
Attending: UROLOGY
Payer: COMMERCIAL

## 2025-06-02 DIAGNOSIS — N13.8 ENLARGED PROSTATE WITH URINARY OBSTRUCTION: ICD-10-CM

## 2025-06-02 DIAGNOSIS — I10 ESSENTIAL HYPERTENSION: ICD-10-CM

## 2025-06-02 DIAGNOSIS — N40.1 ENLARGED PROSTATE WITH URINARY OBSTRUCTION: ICD-10-CM

## 2025-06-02 PROCEDURE — 76775 US EXAM ABDO BACK WALL LIM: CPT

## 2025-06-02 RX ORDER — VALSARTAN AND HYDROCHLOROTHIAZIDE 80; 12.5 MG/1; MG/1
1 TABLET, FILM COATED ORAL DAILY
Qty: 90 TABLET | Refills: 3 | Status: SHIPPED | OUTPATIENT
Start: 2025-06-02

## 2025-08-18 LAB
ALBUMIN SERPL-MCNC: 4.5 G/DL (ref 3.6–5.1)
ALBUMIN/GLOB SERPL: 2.1 (CALC) (ref 1–2.5)
ALP SERPL-CCNC: 72 U/L (ref 35–144)
ALT SERPL-CCNC: 21 U/L (ref 9–46)
AST SERPL-CCNC: 20 U/L (ref 10–35)
BILIRUB SERPL-MCNC: 1 MG/DL (ref 0.2–1.2)
BUN SERPL-MCNC: 9 MG/DL (ref 7–25)
BUN/CREAT SERPL: ABNORMAL (CALC) (ref 6–22)
CALCIUM SERPL-MCNC: 10 MG/DL (ref 8.6–10.3)
CHLORIDE SERPL-SCNC: 96 MMOL/L (ref 98–110)
CHOLEST SERPL-MCNC: 181 MG/DL
CHOLEST/HDLC SERPL: 2.7 (CALC)
CO2 SERPL-SCNC: 28 MMOL/L (ref 20–32)
CREAT SERPL-MCNC: 0.78 MG/DL (ref 0.7–1.22)
EGFRCR SERPLBLD CKD-EPI 2021: 89 ML/MIN/1.73M2
GLOBULIN SER CALC-MCNC: 2.1 G/DL (CALC) (ref 1.9–3.7)
GLUCOSE SERPL-MCNC: 88 MG/DL (ref 65–99)
HBA1C MFR BLD: 5.6 %
HDLC SERPL-MCNC: 66 MG/DL
LDLC SERPL CALC-MCNC: 95 MG/DL (CALC)
NONHDLC SERPL-MCNC: 115 MG/DL (CALC)
POTASSIUM SERPL-SCNC: 3.9 MMOL/L (ref 3.5–5.3)
PROT SERPL-MCNC: 6.6 G/DL (ref 6.1–8.1)
SODIUM SERPL-SCNC: 133 MMOL/L (ref 135–146)
TRIGL SERPL-MCNC: 107 MG/DL

## 2025-08-25 RX ORDER — FLUTICASONE PROPIONATE 50 MCG
SPRAY, SUSPENSION (ML) NASAL
Qty: 32 G | Refills: 3 | Status: SHIPPED | OUTPATIENT
Start: 2025-08-25

## (undated) DEVICE — 3M™ DURAPORE™ SURGICAL TAPE 1538-3, 3 INCH X 10 YARD (7,5CM X 9,1M), 4 ROLLS/BOX: Brand: 3M™ DURAPORE™

## (undated) DEVICE — PACK TUR

## (undated) DEVICE — 60 ML SYRINGE,TOOMEY TYPE: Brand: MONOJECT

## (undated) DEVICE — SUTURE BONE WAX   W31G

## (undated) DEVICE — GOWN SURG LARGE MICROCOOL

## (undated) DEVICE — KIT CATH LAB ANGIO

## (undated) DEVICE — *T* SHEATH INTRODUCER PRELUDE IDEAL HYDROPHILIC SF 5F .021MM

## (undated) DEVICE — DRESSING TELFA 3X8

## (undated) DEVICE — ***USE 56952*** SUTURE VICRYL 1 J371H CTX 36IN VIOLET

## (undated) DEVICE — ***USE 138559*** SUTURE STEEL CCS 7 4-18IN M655

## (undated) DEVICE — PENCIL HAND ELECTROSURGICAL

## (undated) DEVICE — GLOVE SZ 8.5 LINER PROTEXIS PI BL

## (undated) DEVICE — ***USE 121405***PACK VALVE

## (undated) DEVICE — DRESSING MEPILEX 4X8 BORDER

## (undated) DEVICE — IV SET 15 DROP 3/Y GRAVITY CARESITE

## (undated) DEVICE — PLEDGETS SOFT 3/8IN   L-705

## (undated) DEVICE — STOPCOCK THREE WAY LUER LOK

## (undated) DEVICE — GLOVE INDICATOR PI UNDERGLOVE SZ 7.5 BLUE

## (undated) DEVICE — SPONGE RAYTEC 8 X 4 16-PLY

## (undated) DEVICE — PUDDLE VAC

## (undated) DEVICE — TUBING SUCTION 5MM X 12 FT

## (undated) DEVICE — SCD SEQUENTIAL COMPRESSION COMFORT SLEEVE MEDIUM KNEE LENGTH: Brand: KENDALL SCD

## (undated) DEVICE — Device: Brand: OLYMPUS

## (undated) DEVICE — ***USE 56955*** SUTURE VICRYL 2-0  J339H CT-1

## (undated) DEVICE — DRESSING TEGADERM CHG PORT 4.75" X 4.75"

## (undated) DEVICE — ADHESIVE SKIN DERMABOND ADVANCED 0.7ML

## (undated) DEVICE — CATH FOLEY COUDE HEMATURIA 24FR 30ML 3 WAY LUBRICATH

## (undated) DEVICE — SOLN IRRIG .9%SOD 1000ML

## (undated) DEVICE — PAD GROUND ELECTROSURGICAL W/CORD

## (undated) DEVICE — COVER LIGHT HANDLE

## (undated) DEVICE — SUTURE SILK 0 K834H

## (undated) DEVICE — GUIDEWIRE DIAGNOSTIC 035-260 EXCHANGE

## (undated) DEVICE — Device

## (undated) DEVICE — CLIP LIGATING MED 24PK HORIZON

## (undated) DEVICE — BONE HEMOSTASIS MATERIAL OSTENE

## (undated) DEVICE — TRAY URINE METER SILVER TEMP SENSING 16 FR LATEX

## (undated) DEVICE — GUIDEWIRE J-TIP.035 3MM

## (undated) DEVICE — SUTURE VICRYL 3-0 J316H SH VIOLET

## (undated) DEVICE — TR BAND REGULAR

## (undated) DEVICE — SUTURE PROLENE 4-0   8521H

## (undated) DEVICE — PACK OPEN HEART SUTURE

## (undated) DEVICE — CATHETER DIAG FL 3.5  5FR

## (undated) DEVICE — REM POLYHESIVE ADULT PATIENT RETURN ELECTRODE: Brand: VALLEYLAB

## (undated) DEVICE — DRESSING MEPILEX 4X4 BORDER

## (undated) DEVICE — GLOVE SRG BIOGEL 7.5

## (undated) DEVICE — SUTURE PROLENE  4-0  8557H

## (undated) DEVICE — 3M™ TEGADERM™ TRANSPARENT FILM DRESSING FRAME STYLE, 1627, 4 IN X 10 IN (10 CM X 25 CM), 20/CT 4CT/CASE: Brand: 3M™ TEGADERM™

## (undated) DEVICE — CABLE EXTENSION SCREW DOWN 6FT ATRIAL/VENTRICULAR

## (undated) DEVICE — PAD DEFIB BIPHASIC HANDS FREE

## (undated) DEVICE — EVACUATOR BLADDER ELLIK DISP STRL

## (undated) DEVICE — BAG DECANTER

## (undated) DEVICE — BLADE STERNAL SAW

## (undated) DEVICE — CATHETER DIAG FR4 5FR

## (undated) DEVICE — HEMOSTAT SURGICEL ABSORBABLE 4 X 8

## (undated) DEVICE — COVER CAMERA LIGHT HANDLE

## (undated) DEVICE — TIP BOVIE BLADE COATED INSULATED 6IN

## (undated) DEVICE — LUBRICANT SURGILUBE TUBE 4 OZ  FLIP TOP

## (undated) DEVICE — UROCATCH BAG

## (undated) DEVICE — CATH FOLEY 24FR 30ML 2 WAY SILICONE ELASTIMER

## (undated) DEVICE — DRAIN CHEST PLEUREVAC LATEX FREE

## (undated) DEVICE — GLOVE SZ 8 MICRO PROTEXIS LATEX

## (undated) DEVICE — 3M™ STERI-STRIP™ COMPOUND BENZOIN TINCTURE 40 BAGS/CARTON 4 CARTONS/CASE C1544: Brand: 3M™ STERI-STRIP™